# Patient Record
Sex: MALE | Race: BLACK OR AFRICAN AMERICAN | NOT HISPANIC OR LATINO | Employment: FULL TIME | URBAN - METROPOLITAN AREA
[De-identification: names, ages, dates, MRNs, and addresses within clinical notes are randomized per-mention and may not be internally consistent; named-entity substitution may affect disease eponyms.]

---

## 2017-05-08 ENCOUNTER — OFFICE VISIT (OUTPATIENT)
Dept: INTERNAL MEDICINE | Facility: CLINIC | Age: 52
End: 2017-05-08
Payer: COMMERCIAL

## 2017-05-08 ENCOUNTER — TELEPHONE (OUTPATIENT)
Dept: SURGERY | Facility: CLINIC | Age: 52
End: 2017-05-08

## 2017-05-08 VITALS
SYSTOLIC BLOOD PRESSURE: 120 MMHG | DIASTOLIC BLOOD PRESSURE: 80 MMHG | HEIGHT: 71 IN | BODY MASS INDEX: 43.3 KG/M2 | WEIGHT: 309.31 LBS | HEART RATE: 62 BPM | OXYGEN SATURATION: 98 %

## 2017-05-08 DIAGNOSIS — L29.3 ITCHING OF MALE GENITALIA: Primary | ICD-10-CM

## 2017-05-08 PROCEDURE — 99213 OFFICE O/P EST LOW 20 MIN: CPT | Mod: S$GLB,,, | Performed by: FAMILY MEDICINE

## 2017-05-08 PROCEDURE — 99999 PR PBB SHADOW E&M-EST. PATIENT-LVL III: CPT | Mod: PBBFAC,,, | Performed by: FAMILY MEDICINE

## 2017-05-08 PROCEDURE — 1160F RVW MEDS BY RX/DR IN RCRD: CPT | Mod: S$GLB,,, | Performed by: FAMILY MEDICINE

## 2017-05-08 RX ORDER — CLOTRIMAZOLE AND BETAMETHASONE DIPROPIONATE 10; .64 MG/G; MG/G
CREAM TOPICAL 2 TIMES DAILY
Qty: 45 G | Refills: 0 | Status: SHIPPED | OUTPATIENT
Start: 2017-05-08 | End: 2017-05-18

## 2017-05-08 NOTE — PROGRESS NOTES
"S/  UC visit. 2 weeks of penis itching helen inside of foreskin (not circumcised). Also noted a white lump there    O/  /80  Pulse 62  Ht 5' 11" (1.803 m)  Wt (!) 140.3 kg (309 lb 4.9 oz)  SpO2 98%  BMI 43.14 kg/m2  genital area with very faint rash at thigh/scrotum area c/w mild fungal inf  Penile shafter appearts normal before pulling foreskin back  After pulling foreskin back penile head also appears normal. The white bump he refers to appears to be some whitening of skin and perhaps small fatty plaque. Does not appear to be an STD.    Topher was seen today for itching.    Diagnoses and all orders for this visit:    Itching of male genitalia  -     clotrimazole-betamethasone 1-0.05% (LOTRISONE) cream; Apply topically 2 (two) times daily. X 10d  -if not resolved with this message me to have a urology appt set up        "

## 2017-05-08 NOTE — TELEPHONE ENCOUNTER
----- Message from Kay Caruso sent at 5/8/2017 11:45 AM CDT -----  Contact: self  Pt was discharged from [facility] on May 3rd.  He stated that he is not feeling right and needs to speak with the Dr asap.  He is also bleeding from his rectum.  Pt can be reached at [phone]

## 2017-05-08 NOTE — MR AVS SNAPSHOT
[facility]  [address]  [place] [address]  Phone: [phone]  Fax: [phone]                  Topher Cr   2017 11:00 AM   Office Visit    Description:  Male : 1965   Provider:  Jaskaran لاعلي MD   Department:  [facility]           Reason for Visit     Itching           Diagnoses this Visit        Comments    Itching of male genitalia    -  Primary            To Do List           Goals (5 Years of Data)     None       These Medications        Disp Refills Start End    clotrimazole-betamethasone 1-0.05% (LOTRISONE) cream 45 g 0 2017    Apply topically 2 (two) times daily. - Topical (Top)    Pharmacy: [facility] [address] [place] [address] [address] [place] [address] AT [facility] Ph #: [phone]         [facility] On [facility]     [facility] On Call Nurse Care Line -  Assistance  Unless otherwise directed by your provider, please contact [facility], our nurse care line that is available for  assistance.     Registered nurses in the [facility] provide: appointment scheduling, clinical advisement, health education, and other advisory services.  Call: [phone] (toll free)               Medications           Message regarding Medications     Verify the changes and/or additions to your medication regime listed below are the same as discussed with your clinician today.  If any of these changes or additions are incorrect, please notify your healthcare provider.        START taking these NEW medications        Refills    clotrimazole-betamethasone 1-0.05% (LOTRISONE) cream 0    Sig: Apply topically 2 (two) times daily.    Class: Normal    Route: Topical (Top)           Verify that the below list of medications is an accurate representation of the medications you are currently taking.  If none reported, the list may be blank. If incorrect, please contact your healthcare provider. Carry this  "list with you in case of emergency.           Current Medications     clotrimazole-betamethasone 1-0.05% (LOTRISONE) cream Apply topically 2 (two) times daily.           Clinical Reference Information           Your Vitals Were     BP Pulse Height Weight SpO2 BMI    120/80 62 5' 11" (1.803 m) 140.3 kg (309 lb 4.9 oz) 98% 43.14 kg/m2      Blood Pressure          Most Recent Value    BP  120/80      Allergies as of 5/8/2017     Cucumber Fruit Extract      Immunizations Administered on Date of Encounter - 5/8/2017     None      Instructions    If not resolved after 10 days of treatment, message me in [employer] to get a urology appointment       Language Assistance Services     ATTENTION: Language assistance services are available, free of charge. Please call [phone].      ATENCIÓN: Si devonla estelle, tiene a madison disposición servicios gratuitos de asistencia lingüística. Llame al [phone].     STEVEN Ý: N?u b?n nói Ti?ng Vi?t, có các d?ch v? h? tr? ngôn ng? mi?n phí dành cho b?n. G?i s? [phone].         [facility] complies with applicable Federal civil rights laws and does not discriminate on the basis of race, color, national origin, age, disability, or sex.        "

## 2017-08-01 ENCOUNTER — OFFICE VISIT (OUTPATIENT)
Dept: FAMILY MEDICINE | Facility: CLINIC | Age: 52
End: 2017-08-01
Payer: COMMERCIAL

## 2017-08-01 VITALS
DIASTOLIC BLOOD PRESSURE: 84 MMHG | HEIGHT: 71 IN | SYSTOLIC BLOOD PRESSURE: 132 MMHG | TEMPERATURE: 98 F | WEIGHT: 315 LBS | HEART RATE: 72 BPM | BODY MASS INDEX: 44.1 KG/M2

## 2017-08-01 DIAGNOSIS — L30.9 DERMATITIS: Primary | ICD-10-CM

## 2017-08-01 DIAGNOSIS — T63.461A WASP STING, ACCIDENTAL OR UNINTENTIONAL, INITIAL ENCOUNTER: ICD-10-CM

## 2017-08-01 PROCEDURE — 99214 OFFICE O/P EST MOD 30 MIN: CPT | Mod: S$GLB,,, | Performed by: NURSE PRACTITIONER

## 2017-08-01 PROCEDURE — 99999 PR PBB SHADOW E&M-EST. PATIENT-LVL III: CPT | Mod: PBBFAC,,, | Performed by: NURSE PRACTITIONER

## 2017-08-01 RX ORDER — CLOTRIMAZOLE AND BETAMETHASONE DIPROPIONATE 10; .64 MG/G; MG/G
CREAM TOPICAL 2 TIMES DAILY
Qty: 45 G | Refills: 3 | Status: SHIPPED | OUTPATIENT
Start: 2017-08-01 | End: 2017-08-31

## 2017-08-01 NOTE — PATIENT INSTRUCTIONS
Claritin during the day and BEnadryl 50 mg at night for the lip swelling from the wasp sting    Apply the cream to your rash under arms and on back twice a day     Keep skin cool and dry

## 2017-08-01 NOTE — PROGRESS NOTES
"Subjective:       Patient ID: Topher Cr is a 52 y.o. male.    Chief Complaint: Rash (bilateral arm pits) and Oral Swelling (wasp sting on upper lip)    Pt here c/o rash under  right axilla for about a month. Itches.  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  Also was stung in lip by a wasp yesterday.  Mild lip swelling.  NO SOB, no stridor no chest pain, no drooling      Rash   Pertinent negatives include no congestion, cough, fatigue, fever or shortness of breath.     Past Medical History:   Diagnosis Date    DJD (degenerative joint disease)     Hyperlipidemia     Obesity      History reviewed. No pertinent surgical history.  Social History     Social History Narrative    No narrative on file     Family History   Problem Relation Age of Onset    Asthma Mother     Heart disease Father      Vitals:    08/01/17 1109   BP: 132/84   Pulse: 72   Temp: 97.9 °F (36.6 °C)   Weight: (!) 143 kg (315 lb 4.1 oz)   Height: 5' 10.5" (1.791 m)   PainSc:   1     Outpatient Encounter Prescriptions as of 8/1/2017   Medication Sig Dispense Refill    clotrimazole-betamethasone 1-0.05% (LOTRISONE) cream Apply topically 2 (two) times daily. 45 g 3     No facility-administered encounter medications on file as of 8/1/2017.      Wt Readings from Last 3 Encounters:   08/01/17 (!) 143 kg (315 lb 4.1 oz)   05/08/17 (!) 140.3 kg (309 lb 4.9 oz)   07/18/16 (!) 141 kg (310 lb 13.6 oz)     Last 3 sets of Vitals    Vitals - 1 value per visit 7/18/2016 5/8/2017 8/1/2017   SYSTOLIC 144 120 132   DIASTOLIC 78 80 84   PULSE 64 62 72   TEMPERATURE 97.5 - 97.9   RESPIRATIONS - - -   SPO2 - 98 -   Weight (lb) 310.85 309.31 315.26   Weight (kg) 141 140.3 143   HEIGHT 5' 11" 5' 11" 5' 10.5"   BODY MASS INDEX 43.35 43.14 44.6   VISIT REPORT - - -   Pain Score  7 0 1   Some recent data might be hidden     No results found for: CBC  Review of Systems   Constitutional: Negative for appetite change, chills, " fatigue and fever.   HENT: Negative for congestion and drooling.    Respiratory: Negative for cough and shortness of breath.    Cardiovascular: Negative for chest pain.   Genitourinary: Negative for dysuria.   Skin: Positive for rash. Negative for wound.   Neurological: Negative for dizziness, facial asymmetry and headaches.       Objective:      Physical Exam   Constitutional: He is oriented to person, place, and time. He appears well-developed and well-nourished. No distress.   HENT:   Head: Normocephalic and atraumatic.   Mouth/Throat: Uvula is midline and oropharynx is clear and moist.       Eyes: Conjunctivae are normal.   Pulmonary/Chest: Effort normal. No stridor.   Abdominal: Soft.   Neurological: He is alert and oriented to person, place, and time.   Skin: Skin is warm and dry. Capillary refill takes less than 2 seconds. Rash noted. He is not diaphoretic.   Well demarcated, scaly dry rash under right axilla     Psychiatric: He has a normal mood and affect. His behavior is normal. Judgment and thought content normal.   Nursing note and vitals reviewed.      Assessment:       1. Dermatitis    2. Wasp sting, accidental or unintentional, initial encounter        Plan:       Topher was seen today for rash and oral swelling.    Diagnoses and all orders for this visit:    Dermatitis  -     clotrimazole-betamethasone 1-0.05% (LOTRISONE) cream; Apply topically 2 (two) times daily.    Wasp sting, accidental or unintentional, initial encounter  Comments:  to lip        Patient Instructions   Claritin during the day and BEnadryl 50 mg at night for the lip swelling from the wasp sting    Apply the cream to your rash under arms and on back twice a day     Keep skin cool and dry

## 2017-08-30 ENCOUNTER — TELEPHONE (OUTPATIENT)
Dept: FAMILY MEDICINE | Facility: CLINIC | Age: 52
End: 2017-08-30

## 2017-08-30 DIAGNOSIS — Z00.00 ANNUAL PHYSICAL EXAM: Primary | ICD-10-CM

## 2017-08-30 NOTE — TELEPHONE ENCOUNTER
----- Message from Rebeka Martinez sent at 8/30/2017 10:37 AM CDT -----  Contact: Self [phone]  Doctor appointment and lab have been scheduled.  Please link lab orders to the lab appointment.  Date of doctor appointment:  12/19  Physical or EP:  Physical  Date of lab appointment:  12/12  Comments:

## 2017-11-28 ENCOUNTER — OFFICE VISIT (OUTPATIENT)
Dept: FAMILY MEDICINE | Facility: CLINIC | Age: 52
End: 2017-11-28
Payer: COMMERCIAL

## 2017-11-28 VITALS
SYSTOLIC BLOOD PRESSURE: 126 MMHG | HEART RATE: 72 BPM | WEIGHT: 308.63 LBS | DIASTOLIC BLOOD PRESSURE: 80 MMHG | TEMPERATURE: 98 F | HEIGHT: 71 IN | BODY MASS INDEX: 43.21 KG/M2

## 2017-11-28 DIAGNOSIS — L73.9 FOLLICULITIS: Primary | ICD-10-CM

## 2017-11-28 PROCEDURE — 99213 OFFICE O/P EST LOW 20 MIN: CPT | Mod: S$GLB,,, | Performed by: FAMILY MEDICINE

## 2017-11-28 PROCEDURE — 99999 PR PBB SHADOW E&M-EST. PATIENT-LVL III: CPT | Mod: PBBFAC,,, | Performed by: FAMILY MEDICINE

## 2017-11-28 RX ORDER — METHYLPREDNISOLONE 4 MG/1
TABLET ORAL
Qty: 30 TABLET | Refills: 0 | Status: SHIPPED | OUTPATIENT
Start: 2017-11-28 | End: 2018-01-10

## 2017-11-28 RX ORDER — LEVOCETIRIZINE DIHYDROCHLORIDE 5 MG/1
5 TABLET, FILM COATED ORAL NIGHTLY
Qty: 30 TABLET | Refills: 11 | Status: SHIPPED | OUTPATIENT
Start: 2017-11-28 | End: 2018-01-10 | Stop reason: SDUPTHER

## 2017-11-28 RX ORDER — CEPHALEXIN 500 MG/1
500 CAPSULE ORAL EVERY 8 HOURS
Qty: 30 CAPSULE | Refills: 0 | Status: SHIPPED | OUTPATIENT
Start: 2017-11-28 | End: 2017-12-08

## 2017-11-28 NOTE — PROGRESS NOTES
Subjective:       Patient ID: Topher Cr is a 52 y.o. male.    Chief Complaint: Itching    Disclaimer: This note has been generated using voice-recognition software. There may be typographical errors that have been missed during proof-reading    51 yo presents for evaluation of recurrent pruritic rash, involving mostly the left back/gluteal area, present x several weeks.  He denies changes in soaps/detergents/lotions/new clothing.  Previously seen for pruritic rash involving axillae, which has now resolved      Review of Systems   Skin: Positive for color change and rash.       Objective:      Physical Exam   Skin:        Confluent erythematous rash with lichenification, several small ulcerated areas, with largest 1cm diameter, left gluteal area with some involvement of right gluteal area       Assessment:       1. Folliculitis        Plan:       1.  Medrol dosepak  2.  Keflex 500mg tid  3.  Xyzal 5mg   4.  F/u one week, consult Dermatology if not improved

## 2017-12-05 ENCOUNTER — PATIENT MESSAGE (OUTPATIENT)
Dept: FAMILY MEDICINE | Facility: CLINIC | Age: 52
End: 2017-12-05

## 2018-01-10 ENCOUNTER — LAB VISIT (OUTPATIENT)
Dept: LAB | Facility: HOSPITAL | Age: 53
End: 2018-01-10
Attending: FAMILY MEDICINE
Payer: COMMERCIAL

## 2018-01-10 ENCOUNTER — OFFICE VISIT (OUTPATIENT)
Dept: FAMILY MEDICINE | Facility: CLINIC | Age: 53
End: 2018-01-10
Payer: COMMERCIAL

## 2018-01-10 VITALS
TEMPERATURE: 98 F | SYSTOLIC BLOOD PRESSURE: 110 MMHG | BODY MASS INDEX: 44.1 KG/M2 | DIASTOLIC BLOOD PRESSURE: 78 MMHG | WEIGHT: 315 LBS | HEIGHT: 71 IN

## 2018-01-10 DIAGNOSIS — L29.9 GENERALIZED PRURITUS: ICD-10-CM

## 2018-01-10 DIAGNOSIS — L29.9 GENERALIZED PRURITUS: Primary | ICD-10-CM

## 2018-01-10 DIAGNOSIS — L73.9 FOLLICULITIS: ICD-10-CM

## 2018-01-10 LAB
ALBUMIN SERPL BCP-MCNC: 3.6 G/DL
ALP SERPL-CCNC: 46 U/L
ALT SERPL W/O P-5'-P-CCNC: 27 U/L
ANION GAP SERPL CALC-SCNC: 6 MMOL/L
AST SERPL-CCNC: 26 U/L
BASOPHILS # BLD AUTO: 0.06 K/UL
BASOPHILS NFR BLD: 1.1 %
BILIRUB SERPL-MCNC: 0.2 MG/DL
BUN SERPL-MCNC: 12 MG/DL
CALCIUM SERPL-MCNC: 9.3 MG/DL
CHLORIDE SERPL-SCNC: 106 MMOL/L
CO2 SERPL-SCNC: 28 MMOL/L
CREAT SERPL-MCNC: 0.9 MG/DL
DIFFERENTIAL METHOD: ABNORMAL
EOSINOPHIL # BLD AUTO: 0.2 K/UL
EOSINOPHIL NFR BLD: 3.4 %
ERYTHROCYTE [DISTWIDTH] IN BLOOD BY AUTOMATED COUNT: 14.6 %
EST. GFR  (AFRICAN AMERICAN): >60 ML/MIN/1.73 M^2
EST. GFR  (NON AFRICAN AMERICAN): >60 ML/MIN/1.73 M^2
ESTIMATED AVG GLUCOSE: 120 MG/DL
GLUCOSE SERPL-MCNC: 104 MG/DL
HBA1C MFR BLD HPLC: 5.8 %
HCT VFR BLD AUTO: 39.4 %
HGB BLD-MCNC: 12.2 G/DL
IMM GRANULOCYTES # BLD AUTO: 0.04 K/UL
IMM GRANULOCYTES NFR BLD AUTO: 0.8 %
LYMPHOCYTES # BLD AUTO: 2.5 K/UL
LYMPHOCYTES NFR BLD: 47.5 %
MCH RBC QN AUTO: 23 PG
MCHC RBC AUTO-ENTMCNC: 31 G/DL
MCV RBC AUTO: 74 FL
MONOCYTES # BLD AUTO: 0.6 K/UL
MONOCYTES NFR BLD: 10.5 %
NEUTROPHILS # BLD AUTO: 2 K/UL
NEUTROPHILS NFR BLD: 36.7 %
NRBC BLD-RTO: 0 /100 WBC
PLATELET # BLD AUTO: 353 K/UL
PMV BLD AUTO: 10.6 FL
POTASSIUM SERPL-SCNC: 4.5 MMOL/L
PROT SERPL-MCNC: 7.3 G/DL
RBC # BLD AUTO: 5.31 M/UL
SODIUM SERPL-SCNC: 140 MMOL/L
TSH SERPL DL<=0.005 MIU/L-ACNC: 1.29 UIU/ML
WBC # BLD AUTO: 5.31 K/UL

## 2018-01-10 PROCEDURE — 83036 HEMOGLOBIN GLYCOSYLATED A1C: CPT

## 2018-01-10 PROCEDURE — 80074 ACUTE HEPATITIS PANEL: CPT

## 2018-01-10 PROCEDURE — 84443 ASSAY THYROID STIM HORMONE: CPT

## 2018-01-10 PROCEDURE — 99214 OFFICE O/P EST MOD 30 MIN: CPT | Mod: S$GLB,,, | Performed by: FAMILY MEDICINE

## 2018-01-10 PROCEDURE — 36415 COLL VENOUS BLD VENIPUNCTURE: CPT | Mod: PO

## 2018-01-10 PROCEDURE — 99999 PR PBB SHADOW E&M-EST. PATIENT-LVL III: CPT | Mod: PBBFAC,,, | Performed by: FAMILY MEDICINE

## 2018-01-10 PROCEDURE — 85025 COMPLETE CBC W/AUTO DIFF WBC: CPT

## 2018-01-10 PROCEDURE — 80053 COMPREHEN METABOLIC PANEL: CPT

## 2018-01-10 RX ORDER — LEVOCETIRIZINE DIHYDROCHLORIDE 5 MG/1
5 TABLET, FILM COATED ORAL NIGHTLY
Qty: 30 TABLET | Refills: 11 | Status: SHIPPED | OUTPATIENT
Start: 2018-01-10 | End: 2018-04-06

## 2018-01-10 RX ORDER — TRIAMCINOLONE ACETONIDE 1 MG/G
CREAM TOPICAL 2 TIMES DAILY
Qty: 80 G | Refills: 0 | Status: SHIPPED | OUTPATIENT
Start: 2018-01-10 | End: 2018-04-06

## 2018-01-10 NOTE — PROGRESS NOTES
Subjective:       Patient ID: Topher Cr is a 52 y.o. male.    Chief Complaint: Rash    Disclaimer: This note has been generated using voice-recognition software. There may be typographical errors that have been missed during proof-reading    52-year-old presents today for follow-up of prior complaint of generalized pruritus.  After his last visit, symptoms improved for a short period of time, but have now returned.  He describes generalized pruritus without associated rash which seems to occur throughout the day.  It does not seem to be worsened by hot or cold showers.  He is presently not taking size several as previously recommended.  He denies any new medications, or changes in soaps, detergents, or lotions.      Rash       Review of Systems   Constitutional: Negative for unexpected weight change.   Gastrointestinal: Negative for abdominal distention and abdominal pain.   Endocrine: Negative for cold intolerance, heat intolerance, polydipsia, polyphagia and polyuria.   Musculoskeletal: Negative for arthralgias and joint swelling.   Skin: Negative for color change and rash.   Neurological: Negative for weakness.       Objective:      Physical Exam   Constitutional: He appears well-developed and well-nourished. No distress.   obese   Neck: Normal range of motion. No JVD present. No thyromegaly present.   Cardiovascular: Normal rate and regular rhythm.    No murmur heard.  Pulmonary/Chest: Effort normal and breath sounds normal. He has no wheezes. He has no rales.   Abdominal: Soft. Bowel sounds are normal. He exhibits no distension and no mass.   Musculoskeletal: He exhibits edema.   Lymphadenopathy:     He has no cervical adenopathy.   Skin:        Hyperkeratotic area along anterior waist line, some excoriations/hyperpigmentation along both forearms       Assessment:        1.  Generalized pruritus  Plan:       1.  CBC, CMP, A1C, TSH, Hepatitis profile  2.  Restart Xyzal, TAC 0.1% topically to waist line as  needed  3.  Consult Dermatology

## 2018-01-11 LAB
HAV IGM SERPL QL IA: NEGATIVE
HBV CORE IGM SERPL QL IA: NEGATIVE
HBV SURFACE AG SERPL QL IA: NEGATIVE
HCV AB SERPL QL IA: NEGATIVE

## 2018-03-05 ENCOUNTER — PATIENT MESSAGE (OUTPATIENT)
Dept: FAMILY MEDICINE | Facility: CLINIC | Age: 53
End: 2018-03-05

## 2018-03-29 ENCOUNTER — OFFICE VISIT (OUTPATIENT)
Dept: FAMILY MEDICINE | Facility: CLINIC | Age: 53
End: 2018-03-29
Payer: COMMERCIAL

## 2018-03-29 VITALS
HEIGHT: 71 IN | TEMPERATURE: 98 F | DIASTOLIC BLOOD PRESSURE: 80 MMHG | WEIGHT: 312.19 LBS | HEART RATE: 64 BPM | SYSTOLIC BLOOD PRESSURE: 116 MMHG | BODY MASS INDEX: 43.71 KG/M2

## 2018-03-29 DIAGNOSIS — E66.01 MORBID OBESITY WITH BMI OF 40.0-44.9, ADULT: ICD-10-CM

## 2018-03-29 DIAGNOSIS — M79.661 PAIN AND SWELLING OF RIGHT LOWER LEG: Primary | ICD-10-CM

## 2018-03-29 DIAGNOSIS — M79.89 PAIN AND SWELLING OF RIGHT LOWER LEG: Primary | ICD-10-CM

## 2018-03-29 DIAGNOSIS — S89.91XA RIGHT LEG INJURY, INITIAL ENCOUNTER: ICD-10-CM

## 2018-03-29 PROCEDURE — 99214 OFFICE O/P EST MOD 30 MIN: CPT | Mod: S$GLB,,, | Performed by: FAMILY MEDICINE

## 2018-03-29 PROCEDURE — 99999 PR PBB SHADOW E&M-EST. PATIENT-LVL III: CPT | Mod: PBBFAC,,, | Performed by: FAMILY MEDICINE

## 2018-03-29 NOTE — PROGRESS NOTES
"Subjective:      Patient ID: Topher Cr is a 52 y.o. male.    Chief Complaint: Leg Swelling (right)    Here today for RIGHT sided leg swelling & pain since  he jumped across a puddle and felt a sudden pain in the right calf.  This was 3 weeks ago.  He states he has had right leg swelling since then had the pain is decreased.  Denies any bruising.  No history of DVT.  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  He drinks minimal amounts of water daily, use 2 cups of caffeine daily.    No chest pain, no shortness of breath.  No family history of clotting disorder    Current Outpatient Prescriptions on File Prior to Visit   Medication Sig    levocetirizine (XYZAL) 5 MG tablet Take 1 tablet (5 mg total) by mouth every evening.    triamcinolone acetonide 0.1% (KENALOG) 0.1 % cream Apply topically 2 (two) times daily. Use along waistline     No current facility-administered medications on file prior to visit.      Past Medical History:   Diagnosis Date    DJD (degenerative joint disease)     Hyperlipidemia     Morbid obesity with BMI of 40.0-44.9, adult 3/29/2018    Obesity      No past surgical history on file.  Social History     Social History Narrative    Works in [employer], nonsmoker     Family History   Problem Relation Age of Onset    Asthma Mother     Heart disease Father      Vitals:    03/29/18 0943   BP: 116/80   Pulse: 64   Temp: 97.8 °F (36.6 °C)   Weight: (!) 141.6 kg (312 lb 2.7 oz)   Height: 5' 11" (1.803 m)   PainSc: 0-No pain     Objective:   Physical Exam   Constitutional: He appears well-developed and well-nourished. No distress.   Cardiovascular: Normal rate, regular rhythm and normal heart sounds.    Pulmonary/Chest: Effort normal and breath sounds normal.   Musculoskeletal:   1 + pitting edema RIGHT ankle swelling,  Trace pitting edema LEFT ankle, nontender supple calves bilateral, normal gait, no skin changes, no contusion, 2+ pulses, less than 2 second capillary " refill         Psychiatric: He has a normal mood and affect. His behavior is normal.   Vitals reviewed.    Assessment:     1. Pain and swelling of right lower leg    2. Morbid obesity with BMI of 40.0-44.9, adult    3. Right leg injury, initial encounter      Plan:     Orders Placed This Encounter    VAS US Venous Leg Right       Patient Instructions   --------------------------  WATER BALANCE-  Your body systems work best with 64 ounces DAILY  (equal to 8 cups or a HALF A GALLON DAILY)   - to flush out impurities & cleanse our organs.   For every 8 ounces of caffeine you drink, ADD ANOTHER CUP of water to your daily water needs. (2 cups of coffee and one diet coke = you need 11 glasses of water a day). We were not made to drink sugary drinks  - not even artificial sweeteners.   You'll notice a difference in your brain function, energy level & overall wellness. Your liver & kidney filters will thank you too for keeping them properly cleansed  ---------------------------    Get moving - exercise daily 20 minutes    ==============================================  FOR LEG SWELLING:    Try to stop these things that can cause leg swelling: salt, caffeine, energy drinks, diet pills, sudafed, alcohol    DECREASE ALCOHOL CONSUMPTION    DECREASE SALT (fast foods, frozen, canned, processed foods, ham, turkey, fried foods, chips, crackers, etc) & drink 8 glasses of water a day with minimal caffeine ( 1 cup a day)   ==============================================    To ER if acutely worsen because I won't get the results over Good Friday & until the clinic opens next week.                               Answers for HPI/ROS submitted by the patient on 3/27/2018   activity change: No  unexpected weight change: No  neck pain: No  hearing loss: No  rhinorrhea: No  trouble swallowing: No  eye discharge: No  visual disturbance: No  chest tightness: No  wheezing: No  chest pain: No  palpitations: No  blood in stool: No  constipation:  No  vomiting: No  diarrhea: Yes  polydipsia: No  polyuria: No  difficulty urinating: No  urgency: No  hematuria: No  joint swelling: No  arthralgias: No  headaches: No  weakness: No  confusion: No  dysphoric mood: No

## 2018-03-29 NOTE — PATIENT INSTRUCTIONS
--------------------------  WATER BALANCE-  Your body systems work best with 64 ounces DAILY  (equal to 8 cups or a HALF A GALLON DAILY)   - to flush out impurities & cleanse our organs.   For every 8 ounces of caffeine you drink, ADD ANOTHER CUP of water to your daily water needs. (2 cups of coffee and one diet coke = you need 11 glasses of water a day). We were not made to drink sugary drinks  - not even artificial sweeteners.   You'll notice a difference in your brain function, energy level & overall wellness. Your liver & kidney filters will thank you too for keeping them properly cleansed  ---------------------------    Get moving - exercise daily 20 minutes    ==============================================  FOR LEG SWELLING:    Try to stop these things that can cause leg swelling: salt, caffeine, energy drinks, diet pills, sudafed, alcohol    DECREASE ALCOHOL CONSUMPTION    DECREASE SALT (fast foods, frozen, canned, processed foods, ham, turkey, fried foods, chips, crackers, etc) & drink 8 glasses of water a day with minimal caffeine ( 1 cup a day)   ==============================================    To ER if acutely worsen because I won't get the results over Good Friday & until the clinic opens next week.

## 2018-04-05 ENCOUNTER — HOSPITAL ENCOUNTER (OUTPATIENT)
Dept: VASCULAR SURGERY | Facility: CLINIC | Age: 53
Discharge: HOME | End: 2018-04-05
Attending: FAMILY MEDICINE
Payer: COMMERCIAL

## 2018-04-05 DIAGNOSIS — M79.89 PAIN AND SWELLING OF RIGHT LOWER LEG: ICD-10-CM

## 2018-04-05 DIAGNOSIS — M79.661 PAIN AND SWELLING OF RIGHT LOWER LEG: ICD-10-CM

## 2018-04-05 DIAGNOSIS — S89.91XA RIGHT LEG INJURY, INITIAL ENCOUNTER: ICD-10-CM

## 2018-04-05 PROCEDURE — 93970 EXTREMITY STUDY: CPT | Mod: S$GLB,,, | Performed by: SURGERY

## 2018-04-06 ENCOUNTER — PATIENT MESSAGE (OUTPATIENT)
Dept: FAMILY MEDICINE | Facility: CLINIC | Age: 53
End: 2018-04-06

## 2018-04-06 ENCOUNTER — HOSPITAL ENCOUNTER (OUTPATIENT)
Facility: HOSPITAL | Age: 53
Discharge: HOME | End: 2018-04-06
Attending: EMERGENCY MEDICINE | Admitting: HOSPITALIST
Payer: COMMERCIAL

## 2018-04-06 VITALS
WEIGHT: 312 LBS | TEMPERATURE: 98 F | BODY MASS INDEX: 43.68 KG/M2 | DIASTOLIC BLOOD PRESSURE: 76 MMHG | RESPIRATION RATE: 18 BRPM | SYSTOLIC BLOOD PRESSURE: 158 MMHG | HEART RATE: 77 BPM | HEIGHT: 71 IN | OXYGEN SATURATION: 97 %

## 2018-04-06 DIAGNOSIS — L73.9 FOLLICULITIS: ICD-10-CM

## 2018-04-06 DIAGNOSIS — I82.411 ACUTE DEEP VEIN THROMBOSIS (DVT) OF FEMORAL VEIN OF RIGHT LOWER EXTREMITY: Primary | ICD-10-CM

## 2018-04-06 DIAGNOSIS — L29.9 GENERALIZED PRURITUS: ICD-10-CM

## 2018-04-06 LAB
ALBUMIN SERPL BCP-MCNC: 3.9 G/DL
ALP SERPL-CCNC: 47 U/L
ALT SERPL W/O P-5'-P-CCNC: 33 U/L
ANION GAP SERPL CALC-SCNC: 9 MMOL/L
APTT BLDCRRT: 25.4 SEC
AST SERPL-CCNC: 29 U/L
BASOPHILS # BLD AUTO: 0.08 K/UL
BASOPHILS NFR BLD: 1.3 %
BILIRUB SERPL-MCNC: 0.3 MG/DL
BUN SERPL-MCNC: 12 MG/DL
CALCIUM SERPL-MCNC: 9.2 MG/DL
CHLORIDE SERPL-SCNC: 105 MMOL/L
CO2 SERPL-SCNC: 24 MMOL/L
CREAT SERPL-MCNC: 0.8 MG/DL
DIFFERENTIAL METHOD: ABNORMAL
EOSINOPHIL # BLD AUTO: 0.3 K/UL
EOSINOPHIL NFR BLD: 5 %
ERYTHROCYTE [DISTWIDTH] IN BLOOD BY AUTOMATED COUNT: 14.4 %
EST. GFR  (AFRICAN AMERICAN): >60 ML/MIN/1.73 M^2
EST. GFR  (NON AFRICAN AMERICAN): >60 ML/MIN/1.73 M^2
GLUCOSE SERPL-MCNC: 109 MG/DL
HCT VFR BLD AUTO: 41 %
HGB BLD-MCNC: 12.7 G/DL
IMM GRANULOCYTES # BLD AUTO: 0.05 K/UL
IMM GRANULOCYTES NFR BLD AUTO: 0.8 %
INR PPP: 1
LYMPHOCYTES # BLD AUTO: 2.5 K/UL
LYMPHOCYTES NFR BLD: 39.7 %
MCH RBC QN AUTO: 23 PG
MCHC RBC AUTO-ENTMCNC: 31 G/DL
MCV RBC AUTO: 74 FL
MONOCYTES # BLD AUTO: 0.5 K/UL
MONOCYTES NFR BLD: 7.5 %
NEUTROPHILS # BLD AUTO: 2.9 K/UL
NEUTROPHILS NFR BLD: 45.7 %
NRBC BLD-RTO: 0 /100 WBC
PLATELET # BLD AUTO: 388 K/UL
PMV BLD AUTO: 9.2 FL
POTASSIUM SERPL-SCNC: 3.9 MMOL/L
PROT SERPL-MCNC: 7.4 G/DL
PROTHROMBIN TIME: 10.9 SEC
RBC # BLD AUTO: 5.52 M/UL
SODIUM SERPL-SCNC: 138 MMOL/L
WBC # BLD AUTO: 6.39 K/UL

## 2018-04-06 PROCEDURE — 80053 COMPREHEN METABOLIC PANEL: CPT

## 2018-04-06 PROCEDURE — 99284 EMERGENCY DEPT VISIT MOD MDM: CPT

## 2018-04-06 PROCEDURE — 85025 COMPLETE CBC W/AUTO DIFF WBC: CPT

## 2018-04-06 PROCEDURE — 99219 PR INITIAL OBSERVATION CARE,LEVL II: CPT | Mod: SA,,, | Performed by: PHYSICIAN ASSISTANT

## 2018-04-06 PROCEDURE — 85730 THROMBOPLASTIN TIME PARTIAL: CPT

## 2018-04-06 PROCEDURE — 85610 PROTHROMBIN TIME: CPT

## 2018-04-06 PROCEDURE — 99284 EMERGENCY DEPT VISIT MOD MDM: CPT | Mod: SA,,, | Performed by: PHYSICIAN ASSISTANT

## 2018-04-06 PROCEDURE — G0378 HOSPITAL OBSERVATION PER HR: HCPCS

## 2018-04-06 RX ORDER — LEVOCETIRIZINE DIHYDROCHLORIDE 5 MG/1
5 TABLET, FILM COATED ORAL NIGHTLY
Qty: 30 TABLET | Refills: 11 | Status: SHIPPED | OUTPATIENT
Start: 2018-04-06 | End: 2018-11-06 | Stop reason: SDUPTHER

## 2018-04-06 RX ORDER — TRIAMCINOLONE ACETONIDE 1 MG/G
CREAM TOPICAL 2 TIMES DAILY
Qty: 80 G | Refills: 0 | Status: SHIPPED | OUTPATIENT
Start: 2018-04-06 | End: 2018-07-18

## 2018-04-06 NOTE — HOSPITAL COURSE
Pt admitted to observation for acute DVT of RLE found on outpatient ultrasound 4/5. HDS on admission without tachycardia or hypoxia. Labs stable. Pt discharged home with Rx for Eliquis. PCP f/u as outpatient.

## 2018-04-06 NOTE — ED PROVIDER NOTES
Encounter Date: 4/6/2018    SCRIBE #1 NOTE: I, Meena Beverly, am scribing for, and in the presence of, Dr. Treviño.       History     Chief Complaint   Patient presents with    Abnormal Lab     Pt was called by MD for + blood clot in the R leg     Patient is a 52-year-old male with no significant past medical history who presents the ED after being referred by his primary care physician for DVT.  Patient states approximately 3 weeks ago, he jumped over a water puddle and developed right lower extremity pain.  His pain persisted for a few days and has been pain free since. Patient states that he noticed unilateral leg swelling.  Patient presented to his  PCP today and had an ultrasound of his right lower extremity which reveals a clot in the mid SFV. He was referred to the ED for further evaluation and treatment.  Patient denies any fever, chills, chest pain, cough, SOB.  He denies any history of DVT/PE or easy bleeding or bruising.  No other complaints at this time.          Review of patient's allergies indicates:   Allergen Reactions    Cucumber fruit extract Hives and Itching     Past Medical History:   Diagnosis Date    DJD (degenerative joint disease)     Hyperlipidemia     Morbid obesity with BMI of 40.0-44.9, adult 3/29/2018    Obesity      Past Surgical History:   Procedure Laterality Date    KNEE SURGERY Right      Family History   Problem Relation Age of Onset    Asthma Mother     Heart disease Father      Social History   Substance Use Topics    Smoking status: Never Smoker    Smokeless tobacco: Never Used    Alcohol use No     Review of Systems   Constitutional: Negative for chills and fever.   HENT: Negative for ear pain and sore throat.    Eyes: Negative for photophobia.   Respiratory: Negative for cough and shortness of breath.    Cardiovascular: Positive for leg swelling. Negative for chest pain.   Gastrointestinal: Negative for nausea.   Genitourinary: Negative for dysuria and hematuria.    Musculoskeletal: Negative for back pain and neck pain.   Skin: Negative for rash.   Neurological: Negative for weakness and headaches.   Hematological: Does not bruise/bleed easily.       Physical Exam     Initial Vitals [04/06/18 1543]   BP Pulse Resp Temp SpO2   (!) 158/76 77 18 98 °F (36.7 °C) 97 %      MAP       103.33         Physical Exam    Vitals reviewed.  Constitutional: He appears well-developed and well-nourished. He is not diaphoretic. No distress.   HENT:   Head: Normocephalic and atraumatic.   Nose: Nose normal.   Eyes: Conjunctivae and EOM are normal.   Neck: Normal range of motion.   Cardiovascular: Normal rate, regular rhythm and normal heart sounds. Exam reveals no friction rub.    No murmur heard.  Pulses:       Dorsalis pedis pulses are 2+ on the right side, and 2+ on the left side.   Unilateral right LE swelling. No calf tenderness.    Pulmonary/Chest: Breath sounds normal. No respiratory distress. He has no wheezes. He has no rales.   Abdominal: Soft. Bowel sounds are normal. He exhibits no distension. There is no tenderness.   Musculoskeletal: Normal range of motion.   Neurological: He is alert and oriented to person, place, and time. He has normal strength. No sensory deficit.   Skin: Skin is warm and dry. No erythema.   Psychiatric: He has a normal mood and affect. Thought content normal.         ED Course   Procedures  Labs Reviewed   CBC W/ AUTO DIFFERENTIAL - Abnormal; Notable for the following:        Result Value    Hemoglobin 12.7 (*)     MCV 74 (*)     MCH 23.0 (*)     MCHC 31.0 (*)     Platelets 388 (*)     Immature Granulocytes 0.8 (*)     Immature Grans (Abs) 0.05 (*)     All other components within normal limits   COMPREHENSIVE METABOLIC PANEL - Abnormal; Notable for the following:     Alkaline Phosphatase 47 (*)     All other components within normal limits   PROTIME-INR   APTT             Medical Decision Making:   History:   Old Medical Records: I decided to obtain old  medical records.  Clinical Tests:   Lab Tests: Ordered and Reviewed       APC / Resident Notes:   Patient presents the ED with DVT in mid SFV on the right confirmed by US. I do not suspect PE. No complaints of CP or SOB and VSS.    CBC with no leukocytosis.  Microcytic anemia with H/H at 12.7/41.0. Platelets at 388.  CMP with no electrolyte abnormalities.  Creatinine stable at 0.8.  PT and aPTT WNL.    Patient will be placed in observation with hospital medicine for further evaluation and management of DVT on the left.  Will defer anticoagulation to hospital medicine team.       Scribe Attestation:   Scribe #1: I performed the above scribed service and the documentation accurately describes the services I performed. I attest to the accuracy of the note.    Attending Attestation:     Physician Attestation Statement for NP/PA:   I discussed this assessment and plan of this patient with the NP/PA, but I did not personally examine the patient. The face to face encounter was performed by the NP/PA.                     Clinical Impression:   The primary encounter diagnosis was Acute deep vein thrombosis (DVT) of femoral vein of right lower extremity. Diagnoses of Generalized pruritus and Folliculitis were also pertinent to this visit.    Disposition:   Disposition: Placed in Observation  Condition: Stable                        Holly Lai PA-C  04/06/18 1800

## 2018-04-06 NOTE — ED NOTES
LOC: The patient is awake, alert and aware of environment with an appropriate affect, the patient is oriented x 3 and speaking appropriately.  APPEARANCE: Patient resting comfortably and in no acute distress, patient is clean and well groomed  SKIN: The skin is warm and dry, color consistent with ethnicity, patient has normal skin turgor and moist mucus membranes, skin intact, no breakdown or brusing noted.  MUSCULOSKELETAL: Patient moving all extremities well. Pt has swelling +2 to the R lower extremity  RESPIRATORY: Airway is open and patent, breath sounds clear throughout all lung fields; respirations are spontaneous, patient has a normal effort and rate, no accessory muscle use noted.   CARDIAC: Patient has no peripheral edema noted, capillary refill < 3 seconds. No complaints of chest pain   ABDOMEN: Soft and non tender to palpation, no distention noted. Bowel sounds present x 4  NEUROLOGIC: PERRL,  3mm bilaterally, eyes open spontaneously, behavior appropriate to situation, follows commands, facial expression symmetrical, bilateral hand grasp equal and even, purposeful motor response noted, normal sensation in all extremities when touched with a finger.

## 2018-04-06 NOTE — HPI
"52 year old male with no significant past medical history presenting to the ED after being referred by his PCP for DVT found on ultrasound 4/5. Patient reports being in his normal state of health until ~3 weeks ago when he "jumped over a puddle" and developed right leg pain and swelling. Patient initially attributed pain and swelling to suspected musculoskeletal injury. Pt reports pain persisted for a few days and self resolved. Patient presented to PCP 3/29 and an outpatient ultrasound of RLE was ordered. US performed 4/5 revealed a non-occlusive thrombus with partial compression in the mid SFV. At the time of my exam, pain to RLE has entirely resolved. Pt also reports improved swelling to RLE. Patient denies difficulty with ambulation, chest pain, SOB, abdominal pain, N/V/D, fever, chills, and recent illness. Patient denies history of DVT/PE.      In the ED, HDS without tachycardia or hypoxia. Labs stable.  "

## 2018-04-06 NOTE — ASSESSMENT & PLAN NOTE
"- Referred by PCP to the ED for DVT found on ultrasound 4/5  - DVT likely occurred 3 weeks ago s/p "jumped over a puddle" with associated RLE pain and swelling  - US performed 4/5 revealed a non-occlusive thrombus with partial compression in the mid SFV  - Pain entirely resolved and swelling improved on admit per patient  - Patient denies all other complaints, including history of DVT/PE  - In the ED, HDS. No tachycardia or hypoxia. Labs stable  - Discussed risks/benefits of anticoagulation. Provided patient with Rx for Eliquis to begin tonight. Educated on how to take medication and answered all questions. Patient voiced understanding.  - PCP f/u as outpatient  "

## 2018-04-06 NOTE — H&P
"[facility] Hospital Medicine  History & Physical    Patient Name: Topher Cr  MRN: [id number]  Admission Date: 4/6/2018  Attending Physician: Kimberley Mejia MD   Primary Care Provider: Navin Mcfadden MD    [facility] Medicine Team: [facility] MED  Shalonda Mcrae PA-C     Patient information was obtained from patient, past medical records and ER records.     Subjective:     Principal Problem:Acute deep vein thrombosis (DVT) of femoral vein of right lower extremity    Chief Complaint:   Chief Complaint   Patient presents with    Abnormal Lab     Pt was called by MD for + blood clot in the R leg        HPI: 52 year old male with no significant past medical history presenting to the ED after being referred by his PCP for DVT found on ultrasound 4/5. Patient  reports being in his normal state of health until ~3 weeks ago when he "jumped over a puddle" and developed right leg pain and swelling. Patient initially attributed pain and swelling to suspected musculoskeletal injury. Pt reports pain persisted for a few days and self resolved. Patient presented to PCP  3/29 and an outpatient ultrasound of RLE was ordered. US performed 4/5 revealed a non-occlusive thrombus with partial compression in the mid SFV. At the time of my exam, pain to RLE has entirely resolved. Pt also reports improved swelling to RLE. Patient denies difficulty with ambulation, chest pain, SOB, abdominal pain, N/V/D, fever, chills, and recent illness. Patient  denies history of DVT/PE.      In the ED, HDS without tachycardia or hypoxia. Labs stable.    Past Medical History:   Diagnosis Date    DJD (degenerative joint disease)     Hyperlipidemia     Morbid obesity with BMI of 40.0-44.9, adult 3/29/2018    Obesity        Past Surgical History:   Procedure Laterality Date    KNEE SURGERY Right        Review of patient's allergies indicates:   Allergen Reactions    Cucumber fruit extract Hives and Itching       No current " facility-administered medications on file prior to encounter.      Current Outpatient Prescriptions on File Prior to Encounter   Medication Sig    [DISCONTINUED] levocetirizine (XYZAL) 5 MG tablet Take 1 tablet (5 mg total) by mouth every evening.    [DISCONTINUED] triamcinolone acetonide 0.1% (KENALOG) 0.1 % cream Apply topically 2 (two) times daily. Use along waistline     Family History     Problem Relation (Age of Onset)    Asthma Mother    Heart disease Father        Social History Main Topics    Smoking status: Never Smoker    Smokeless tobacco: Never Used    Alcohol use No    Drug use: No    Sexual activity: Not on file     Review of Systems   Constitutional: Negative for chills, diaphoresis, fatigue and fever.   HENT: Negative for congestion, drooling, hearing loss, rhinorrhea, sore throat and trouble swallowing.    Eyes: Negative for photophobia and visual disturbance.   Respiratory: Negative for cough, chest tightness, shortness of breath and wheezing.    Cardiovascular: Positive for leg swelling (RLE x3 weeks). Negative for chest pain and palpitations.   Gastrointestinal: Negative for abdominal distention, abdominal pain, diarrhea, nausea and vomiting.   Endocrine: Negative for cold intolerance and heat intolerance.   Genitourinary: Negative for difficulty urinating, dysuria, flank pain, frequency and hematuria.   Musculoskeletal: Negative for back pain, gait problem, myalgias and neck pain.   Skin: Negative for rash and wound.   Neurological: Negative for dizziness, syncope, weakness, light-headedness, numbness and headaches.   Hematological: Negative for adenopathy. Does not bruise/bleed easily.   Psychiatric/Behavioral: Negative for agitation, behavioral problems, confusion and dysphoric mood. The patient is not nervous/anxious.      Objective:     Vital Signs (Most Recent):  Temp: 98 °F (36.7 °C) (04/06/18 1543)  Pulse: 77 (04/06/18 1543)  Resp: 18 (04/06/18 1543)  BP: (!) 158/76 (04/06/18  "1543)  SpO2: 97 % (04/06/18 1543) Vital Signs (24h Range):  Temp:  [98 °F (36.7 °C)] 98 °F (36.7 °C)  Pulse:  [77] 77  Resp:  [18] 18  SpO2:  [97 %] 97 %  BP: (158)/(76) 158/76     Weight: (!) 141.5 kg (312 lb)  Body mass index is 43.52 kg/m².    Physical Exam   Constitutional: He is oriented to person, place, and time. He appears well-developed. No distress.   HENT:   Head: Normocephalic and atraumatic.   Eyes: Conjunctivae and EOM are normal. Right eye exhibits no discharge. Left eye exhibits no discharge. No scleral icterus.   Neck: Normal range of motion. Neck supple.   Cardiovascular: Normal rate, regular rhythm, normal heart sounds and intact distal pulses.    Pulmonary/Chest: Effort normal and breath sounds normal. No respiratory distress. He has no wheezes.   Abdominal: Soft. Bowel sounds are normal. He exhibits no distension. There is no tenderness.   Obese    Musculoskeletal: Normal range of motion. He exhibits edema (2+ to RLE). He exhibits no tenderness or deformity.   Neurological: He is alert and oriented to person, place, and time. No cranial nerve deficit.   Skin: Skin is warm and dry. He is not diaphoretic. No erythema.   Psychiatric: He has a normal mood and affect. His behavior is normal.         CRANIAL NERVES     CN III, IV, VI   Extraocular motions are normal.        Significant Labs: All pertinent labs within the past 24 hours have been reviewed.    Significant Imaging: I have reviewed all pertinent imaging results/findings within the past 24 hours.    Assessment/Plan:     * Acute deep vein thrombosis (DVT) of femoral vein of right lower extremity    - Referred by PCP to the ED for DVT found on ultrasound 4/5  - DVT likely occurred 3 weeks ago s/p "jumped over a puddle" with associated RLE pain and swelling  - US performed 4/5 revealed a non-occlusive thrombus with partial compression in the mid SFV  - Pain entirely resolved and swelling improved on admit per patient  - Patient denies all " other complaints, including history of DVT/PE  - In the ED, HDS. No tachycardia or hypoxia. Labs stable  - Discussed risks/benefits of anticoagulation. Provided patient with Rx for Eliquis to begin tonight. Educated on how to take medication and answered all questions. Patient voiced understanding.  - PCP f/u as outpatient          VTE Risk Mitigation     None             Shalonda Mcrae PA-C  Department of Hospital Medicine   [facility]  Discussed with staff: Dr. Mejia

## 2018-04-06 NOTE — TELEPHONE ENCOUNTER
Pt advised that US did show a DVT (blood clot) in pt's leg.  This can be life threatening.  Pt should go to ER now.  Pt verbalized understanding

## 2018-04-06 NOTE — SUBJECTIVE & OBJECTIVE
Past Medical History:   Diagnosis Date    DJD (degenerative joint disease)     Hyperlipidemia     Morbid obesity with BMI of 40.0-44.9, adult 3/29/2018    Obesity        Past Surgical History:   Procedure Laterality Date    KNEE SURGERY Right        Review of patient's allergies indicates:   Allergen Reactions    Cucumber fruit extract Hives and Itching       No current facility-administered medications on file prior to encounter.      Current Outpatient Prescriptions on File Prior to Encounter   Medication Sig    [DISCONTINUED] levocetirizine (XYZAL) 5 MG tablet Take 1 tablet (5 mg total) by mouth every evening.    [DISCONTINUED] triamcinolone acetonide 0.1% (KENALOG) 0.1 % cream Apply topically 2 (two) times daily. Use along waistline     Family History     Problem Relation (Age of Onset)    Asthma Mother    Heart disease Father        Social History Main Topics    Smoking status: Never Smoker    Smokeless tobacco: Never Used    Alcohol use No    Drug use: No    Sexual activity: Not on file     Review of Systems   Constitutional: Negative for chills, diaphoresis, fatigue and fever.   HENT: Negative for congestion, drooling, hearing loss, rhinorrhea, sore throat and trouble swallowing.    Eyes: Negative for photophobia and visual disturbance.   Respiratory: Negative for cough, chest tightness, shortness of breath and wheezing.    Cardiovascular: Positive for leg swelling (RLE x3 weeks). Negative for chest pain and palpitations.   Gastrointestinal: Negative for abdominal distention, abdominal pain, diarrhea, nausea and vomiting.   Endocrine: Negative for cold intolerance and heat intolerance.   Genitourinary: Negative for difficulty urinating, dysuria, flank pain, frequency and hematuria.   Musculoskeletal: Negative for back pain, gait problem, myalgias and neck pain.   Skin: Negative for rash and wound.   Neurological: Negative for dizziness, syncope, weakness, light-headedness, numbness and headaches.    Hematological: Negative for adenopathy. Does not bruise/bleed easily.   Psychiatric/Behavioral: Negative for agitation, behavioral problems, confusion and dysphoric mood. The patient is not nervous/anxious.      Objective:     Vital Signs (Most Recent):  Temp: 98 °F (36.7 °C) (04/06/18 1543)  Pulse: 77 (04/06/18 1543)  Resp: 18 (04/06/18 1543)  BP: (!) 158/76 (04/06/18 1543)  SpO2: 97 % (04/06/18 1543) Vital Signs (24h Range):  Temp:  [98 °F (36.7 °C)] 98 °F (36.7 °C)  Pulse:  [77] 77  Resp:  [18] 18  SpO2:  [97 %] 97 %  BP: (158)/(76) 158/76     Weight: (!) 141.5 kg (312 lb)  Body mass index is 43.52 kg/m².    Physical Exam   Constitutional: He is oriented to person, place, and time. He appears well-developed. No distress.   HENT:   Head: Normocephalic and atraumatic.   Eyes: Conjunctivae and EOM are normal. Right eye exhibits no discharge. Left eye exhibits no discharge. No scleral icterus.   Neck: Normal range of motion. Neck supple.   Cardiovascular: Normal rate, regular rhythm, normal heart sounds and intact distal pulses.    Pulmonary/Chest: Effort normal and breath sounds normal. No respiratory distress. He has no wheezes.   Abdominal: Soft. Bowel sounds are normal. He exhibits no distension. There is no tenderness.   Obese    Musculoskeletal: Normal range of motion. He exhibits edema (2+ to RLE). He exhibits no tenderness or deformity.   Neurological: He is alert and oriented to person, place, and time. No cranial nerve deficit.   Skin: Skin is warm and dry. He is not diaphoretic. No erythema.   Psychiatric: He has a normal mood and affect. His behavior is normal.         CRANIAL NERVES     CN III, IV, VI   Extraocular motions are normal.        Significant Labs: All pertinent labs within the past 24 hours have been reviewed.    Significant Imaging: I have reviewed all pertinent imaging results/findings within the past 24 hours.

## 2018-04-06 NOTE — DISCHARGE SUMMARY
"[facility] Hospital Medicine  Discharge Summary      Patient Name: Topher Cr  MRN: [id number]  Admission Date: 4/6/2018  Hospital Length of Stay: 0 days  Discharge Date and Time: 4/6/2018  Attending Physician: Leatha Treviño MD   Discharging Provider: Shalonda Mcrae PA-C  Primary Care Provider: Navin Mcfadden MD  [facility] Medicine Team: [facility] MED F Shalonda Mcrae PA-C    HPI:   52 year old male with no significant past medical history presenting to the ED after being referred by his PCP for DVT found on ultrasound 4/5. Patient  reports being in his normal state of health until ~3 weeks ago when he "jumped over a puddle" and developed right leg pain and swelling. Patient initially attributed pain and swelling to suspected musculoskeletal injury. Pt reports pain persisted for a few days and self resolved. Patient presented to PCP  3/29 and an outpatient ultrasound of RLE was ordered. US performed 4/5 revealed a non-occlusive thrombus with partial compression in the mid SFV. At the time of my exam, pain to RLE has entirely resolved. Pt also reports improved swelling to RLE. Patient denies difficulty with ambulation, chest pain, SOB, abdominal pain, N/V/D, fever, chills, and recent illness. Patient  denies history of DVT/PE.      In the ED, HDS without tachycardia or hypoxia. Labs stable.    * No surgery found *      Hospital Course:   Pt admitted to observation for acute DVT of RLE found on outpatient ultrasound 4/5. HDS on admission without tachycardia or hypoxia. Labs stable. Pt discharged home with Rx for Eliquis. PCP f/u as outpatient.       * Acute deep vein thrombosis (DVT) of femoral vein of right lower extremity    - Referred by PCP to the ED for DVT found on ultrasound 4/5  - DVT likely occurred 3 weeks ago s/p "jumped over a puddle" with associated RLE pain and swelling  - US performed 4/5 revealed a non-occlusive thrombus with partial compression in the mid SFV  - Pain entirely " resolved and swelling improved on admit per patient  - Patient denies all other complaints, including history of DVT/PE  - In the ED, HDS. No tachycardia or hypoxia. Labs stable  - Discussed risks/benefits of anticoagulation. Provided patient with Rx for Eliquis to begin tonight. Educated on how to take medication and answered all questions. Patient voiced understanding.   - Discharged home in stable condition to follow up with PCP as outpatient          Final Active Diagnoses:    Diagnosis Date Noted POA    PRINCIPAL PROBLEM:  Acute deep vein thrombosis (DVT) of femoral vein of right lower extremity [I82.411] 04/06/2018 Yes      Problems Resolved During this Admission:    Diagnosis Date Noted Date Resolved POA       Discharged Condition: stable    Disposition: Home or Self Care    Follow Up:  Follow-up Information     Call Navin Mcfadden MD.    Specialty:  Family Medicine  Contact information:  [address]  [address]  [facility] [address]  [phone]                 Patient Instructions:     Activity as tolerated     Notify your health care provider if you experience any of the following:  temperature >100.4     Notify your health care provider if you experience any of the following:  severe uncontrolled pain     Notify your health care provider if you experience any of the following:  redness, tenderness, or signs of infection (pain, swelling, redness, odor or green/yellow discharge around incision site)     Notify your health care provider if you experience any of the following:  difficulty breathing or increased cough         Significant Diagnostic Studies: Radiology: Ultrasound: RLE    Pending Diagnostic Studies:     None         Medications:  Reconciled Home Medications:      Medication List      START taking these medications    apixaban 5 mg Tab  Take 10 mg (2 tablets) twice a day for 7 days then 5 mg (1 tablet) twice a day.        CONTINUE taking these medications    levocetirizine 5 MG  tablet  Commonly known as:  XYZAL  Take 1 tablet (5 mg total) by mouth every evening.     triamcinolone acetonide 0.1% 0.1 % cream  Commonly known as:  KENALOG  Apply topically 2 (two) times daily. Use along waistline           Where to Get Your Medications      These medications were sent to [facility] [address] [place], [place] [address] [facility] AT [address] [address]    Phone:  [phone]   · levocetirizine 5 MG tablet  · triamcinolone acetonide 0.1% 0.1 % cream     You can get these medications from any pharmacy    Bring a paper prescription for each of these medications  · apixaban 5 mg Tab         Indwelling Lines/Drains at time of discharge:   Lines/Drains/Airways          No matching active lines, drains, or airways          Time spent on the discharge of patient: 30 minutes  Patient was seen and examined on the date of discharge and determined to be suitable for discharge.         Shalonda Mcrae PA-C  Department of Hospital Medicine  [facility]  Discussed with staff: Dr. Mejia

## 2018-04-06 NOTE — ASSESSMENT & PLAN NOTE
"- Referred by PCP to the ED for DVT found on ultrasound 4/5  - DVT likely occurred 3 weeks ago s/p "jumped over a puddle" with associated RLE pain and swelling  - US performed 4/5 revealed a non-occlusive thrombus with partial compression in the mid SFV  - Pain entirely resolved and swelling improved on admit per patient  - Patient denies all other complaints, including history of DVT/PE  - In the ED, HDS. No tachycardia or hypoxia. Labs stable  - Discussed risks/benefits of anticoagulation. Provided patient with Rx for Eliquis to begin tonight. Educated on how to take medication and answered all questions. Patient voiced understanding.   - Discharged home in stable condition to follow up with PCP as outpatient  "

## 2018-04-06 NOTE — ED NOTES
Pt was sent here by PMD for + blood clot in the R leg. Pt denies pain, SOB, or CP at this time.  Pt has swelling to the R lower extremity.

## 2018-06-28 ENCOUNTER — LAB VISIT (OUTPATIENT)
Dept: LAB | Facility: HOSPITAL | Age: 53
End: 2018-06-28
Attending: FAMILY MEDICINE
Payer: COMMERCIAL

## 2018-06-28 DIAGNOSIS — Z00.00 ANNUAL PHYSICAL EXAM: ICD-10-CM

## 2018-06-28 LAB
ALBUMIN SERPL BCP-MCNC: 3.6 G/DL
ALP SERPL-CCNC: 44 U/L
ALT SERPL W/O P-5'-P-CCNC: 33 U/L
ANION GAP SERPL CALC-SCNC: 6 MMOL/L
AST SERPL-CCNC: 30 U/L
BASOPHILS # BLD AUTO: 0.08 K/UL
BASOPHILS NFR BLD: 1.4 %
BILIRUB SERPL-MCNC: 0.4 MG/DL
BUN SERPL-MCNC: 10 MG/DL
CALCIUM SERPL-MCNC: 8.9 MG/DL
CHLORIDE SERPL-SCNC: 106 MMOL/L
CHOLEST SERPL-MCNC: 166 MG/DL
CHOLEST/HDLC SERPL: 4.7 {RATIO}
CO2 SERPL-SCNC: 26 MMOL/L
CREAT SERPL-MCNC: 0.9 MG/DL
DIFFERENTIAL METHOD: ABNORMAL
EOSINOPHIL # BLD AUTO: 0.3 K/UL
EOSINOPHIL NFR BLD: 4.9 %
ERYTHROCYTE [DISTWIDTH] IN BLOOD BY AUTOMATED COUNT: 14.7 %
EST. GFR  (AFRICAN AMERICAN): >60 ML/MIN/1.73 M^2
EST. GFR  (NON AFRICAN AMERICAN): >60 ML/MIN/1.73 M^2
GLUCOSE SERPL-MCNC: 101 MG/DL
HCT VFR BLD AUTO: 40.6 %
HDLC SERPL-MCNC: 35 MG/DL
HDLC SERPL: 21.1 %
HGB BLD-MCNC: 12.7 G/DL
IMM GRANULOCYTES # BLD AUTO: 0.06 K/UL
IMM GRANULOCYTES NFR BLD AUTO: 1 %
LDLC SERPL CALC-MCNC: 112.6 MG/DL
LYMPHOCYTES # BLD AUTO: 2.7 K/UL
LYMPHOCYTES NFR BLD: 47.3 %
MCH RBC QN AUTO: 23.6 PG
MCHC RBC AUTO-ENTMCNC: 31.3 G/DL
MCV RBC AUTO: 76 FL
MONOCYTES # BLD AUTO: 0.6 K/UL
MONOCYTES NFR BLD: 9.6 %
NEUTROPHILS # BLD AUTO: 2.1 K/UL
NEUTROPHILS NFR BLD: 35.8 %
NONHDLC SERPL-MCNC: 131 MG/DL
NRBC BLD-RTO: 0 /100 WBC
PLATELET # BLD AUTO: 349 K/UL
PMV BLD AUTO: 10.6 FL
POTASSIUM SERPL-SCNC: 4.3 MMOL/L
PROT SERPL-MCNC: 6.9 G/DL
RBC # BLD AUTO: 5.38 M/UL
SODIUM SERPL-SCNC: 138 MMOL/L
TRIGL SERPL-MCNC: 92 MG/DL
TSH SERPL DL<=0.005 MIU/L-ACNC: 0.85 UIU/ML
WBC # BLD AUTO: 5.73 K/UL

## 2018-06-28 PROCEDURE — 36415 COLL VENOUS BLD VENIPUNCTURE: CPT | Mod: PO

## 2018-06-28 PROCEDURE — 80053 COMPREHEN METABOLIC PANEL: CPT

## 2018-06-28 PROCEDURE — 85025 COMPLETE CBC W/AUTO DIFF WBC: CPT

## 2018-06-28 PROCEDURE — 80061 LIPID PANEL: CPT

## 2018-06-28 PROCEDURE — 84443 ASSAY THYROID STIM HORMONE: CPT

## 2018-07-03 ENCOUNTER — OFFICE VISIT (OUTPATIENT)
Dept: FAMILY MEDICINE | Facility: CLINIC | Age: 53
End: 2018-07-03
Payer: COMMERCIAL

## 2018-07-03 VITALS
HEIGHT: 71 IN | SYSTOLIC BLOOD PRESSURE: 130 MMHG | WEIGHT: 311.94 LBS | DIASTOLIC BLOOD PRESSURE: 77 MMHG | TEMPERATURE: 97 F | BODY MASS INDEX: 43.67 KG/M2 | HEART RATE: 69 BPM

## 2018-07-03 DIAGNOSIS — Z00.00 ROUTINE GENERAL MEDICAL EXAMINATION AT A HEALTH CARE FACILITY: Primary | ICD-10-CM

## 2018-07-03 DIAGNOSIS — I82.411 ACUTE DEEP VEIN THROMBOSIS (DVT) OF FEMORAL VEIN OF RIGHT LOWER EXTREMITY: ICD-10-CM

## 2018-07-03 DIAGNOSIS — E66.01 MORBID OBESITY WITH BMI OF 40.0-44.9, ADULT: ICD-10-CM

## 2018-07-03 PROCEDURE — 99999 PR PBB SHADOW E&M-EST. PATIENT-LVL III: CPT | Mod: PBBFAC,,, | Performed by: FAMILY MEDICINE

## 2018-07-03 PROCEDURE — 99396 PREV VISIT EST AGE 40-64: CPT | Mod: S$GLB,,, | Performed by: FAMILY MEDICINE

## 2018-07-03 NOTE — PROGRESS NOTES
Subjective:       Patient ID: Topher Cr is a 52 y.o. male.    Chief Complaint: Annual Exam    Disclaimer: This note has been generated using voice-recognition software. There may be typographical errors that have been missed during proof-reading    Pt presents for routine exam, last exam one year ago  Seen about 3 months ago with diagnosis of DVT, presently on Eliquis without side effects.  Denies leg pain or swelling  Immunizations:  UTD  Colonoscopy:  2015      Review of Systems   Constitutional: Negative for activity change, appetite change, chills, fatigue, fever and unexpected weight change.   HENT: Negative for congestion, ear discharge, ear pain, hearing loss and sinus pressure.    Eyes: Negative for pain and visual disturbance.   Respiratory: Negative for cough, chest tightness and shortness of breath.    Cardiovascular: Negative for chest pain, palpitations and leg swelling.   Gastrointestinal: Negative for abdominal distention and abdominal pain.   Genitourinary: Negative for difficulty urinating, dysuria, frequency and hematuria.   Musculoskeletal: Negative for arthralgias, back pain, gait problem, joint swelling, myalgias, neck pain and neck stiffness.   Skin: Negative for rash.   Neurological: Negative for dizziness, tremors, speech difficulty, weakness, numbness and headaches.   Psychiatric/Behavioral: Negative for agitation and behavioral problems.       Objective:      Physical Exam   Constitutional: He is oriented to person, place, and time. He appears well-developed and well-nourished.   HENT:   Head: Normocephalic.   Right Ear: Tympanic membrane and external ear normal.   Left Ear: Tympanic membrane and external ear normal.   Nose: Nose normal.   Mouth/Throat: Uvula is midline, oropharynx is clear and moist and mucous membranes are normal.   Eyes: Conjunctivae and EOM are normal. Pupils are equal, round, and reactive to light.   Neck: Normal range of motion. Neck supple. No JVD present. Carotid  bruit is not present. No thyroid mass and no thyromegaly present.   Cardiovascular: Normal rate, regular rhythm and normal heart sounds.    No murmur heard.  Pulmonary/Chest: Effort normal and breath sounds normal. He has no rales.   Abdominal: Soft. Bowel sounds are normal. He exhibits no mass. There is no tenderness. Hernia confirmed negative in the right inguinal area and confirmed negative in the left inguinal area.   Genitourinary: Testes normal and penis normal. Right testis shows no mass and no tenderness. Left testis shows no mass and no tenderness.   Musculoskeletal: Normal range of motion. He exhibits no edema.   Negative Homans  No calf tenderness   Lymphadenopathy:     He has no cervical adenopathy. No inguinal adenopathy noted on the right or left side.   Neurological: He is alert and oriented to person, place, and time. He has normal reflexes. No cranial nerve deficit.   Skin: Skin is warm. No lesion and no rash noted.   Psychiatric: He has a normal mood and affect.       Assessment:       1.  Physical exam  2.  S/p DVT, Eliquis x 3 months  3.  Morbid obesity  Plan:       1.  Labs reviewed with pt  2.  Encourage weight loss 3.  Repeat venous US

## 2018-07-12 ENCOUNTER — HOSPITAL ENCOUNTER (OUTPATIENT)
Dept: VASCULAR SURGERY | Facility: CLINIC | Age: 53
Discharge: HOME | End: 2018-07-12
Attending: FAMILY MEDICINE
Payer: COMMERCIAL

## 2018-07-12 DIAGNOSIS — I82.411 ACUTE DEEP VEIN THROMBOSIS (DVT) OF FEMORAL VEIN OF RIGHT LOWER EXTREMITY: ICD-10-CM

## 2018-07-12 PROCEDURE — 93971 EXTREMITY STUDY: CPT | Mod: S$GLB,,, | Performed by: SURGERY

## 2018-07-18 ENCOUNTER — PATIENT MESSAGE (OUTPATIENT)
Dept: FAMILY MEDICINE | Facility: CLINIC | Age: 53
End: 2018-07-18

## 2018-07-18 NOTE — TELEPHONE ENCOUNTER
Patient's chart updated to reflect the following:        Comments     triamcinolone acetonide 0.1% 0.1 % cream 4/6/2018 Topher Cox

## 2018-10-25 ENCOUNTER — OFFICE VISIT (OUTPATIENT)
Dept: FAMILY MEDICINE | Facility: CLINIC | Age: 53
End: 2018-10-25
Payer: COMMERCIAL

## 2018-10-25 VITALS
TEMPERATURE: 98 F | WEIGHT: 315 LBS | SYSTOLIC BLOOD PRESSURE: 121 MMHG | RESPIRATION RATE: 20 BRPM | DIASTOLIC BLOOD PRESSURE: 70 MMHG | HEIGHT: 71 IN | BODY MASS INDEX: 44.1 KG/M2

## 2018-10-25 DIAGNOSIS — R10.13 EPIGASTRIC ABDOMINAL PAIN: Primary | ICD-10-CM

## 2018-10-25 DIAGNOSIS — E66.01 MORBID OBESITY WITH BMI OF 40.0-44.9, ADULT: ICD-10-CM

## 2018-10-25 PROCEDURE — 99214 OFFICE O/P EST MOD 30 MIN: CPT | Mod: S$GLB,,, | Performed by: FAMILY MEDICINE

## 2018-10-25 PROCEDURE — 3008F BODY MASS INDEX DOCD: CPT | Mod: CPTII,S$GLB,, | Performed by: FAMILY MEDICINE

## 2018-10-25 PROCEDURE — 99999 PR PBB SHADOW E&M-EST. PATIENT-LVL III: CPT | Mod: PBBFAC,,, | Performed by: FAMILY MEDICINE

## 2018-10-25 RX ORDER — OMEPRAZOLE 20 MG/1
20 CAPSULE, DELAYED RELEASE ORAL DAILY
Qty: 30 CAPSULE | Refills: 0 | Status: SHIPPED | OUTPATIENT
Start: 2018-10-25 | End: 2018-11-08

## 2018-10-25 NOTE — PATIENT INSTRUCTIONS
Try to decrease the  triggers of heartburn which include - alcohol,   Tobacco, caffeine, spicy foods, fatty foods, eating large meals before lying down.     Also taking an antiinflammatory ( like Aspirin, Advil (ibuprofen), Alleve (naproxen), Mobic, Aspirin 325mg )  daily can worsen Heartburn or Reflux (GERD)    Call  & let me know  if symptoms worsen or persist after taking prescription medication - OMEPRAZOLE (Prilosec) for one - four weeks    If you are not better, we may consider referral to gastroenterology for further testing or  EGD

## 2018-11-05 ENCOUNTER — TELEPHONE (OUTPATIENT)
Dept: GASTROENTEROLOGY | Facility: CLINIC | Age: 53
End: 2018-11-05

## 2018-11-05 ENCOUNTER — HOSPITAL ENCOUNTER (EMERGENCY)
Facility: HOSPITAL | Age: 53
Discharge: HOME | End: 2018-11-05
Attending: EMERGENCY MEDICINE
Payer: COMMERCIAL

## 2018-11-05 ENCOUNTER — TELEPHONE (OUTPATIENT)
Dept: FAMILY MEDICINE | Facility: CLINIC | Age: 53
End: 2018-11-05

## 2018-11-05 VITALS
WEIGHT: 311 LBS | BODY MASS INDEX: 43.54 KG/M2 | HEART RATE: 85 BPM | HEIGHT: 71 IN | TEMPERATURE: 98 F | OXYGEN SATURATION: 96 % | RESPIRATION RATE: 20 BRPM | DIASTOLIC BLOOD PRESSURE: 65 MMHG | SYSTOLIC BLOOD PRESSURE: 148 MMHG

## 2018-11-05 DIAGNOSIS — K65.4 MESENTERIC PANNICULITIS: ICD-10-CM

## 2018-11-05 DIAGNOSIS — R10.9 ABDOMINAL PAIN, UNSPECIFIED ABDOMINAL LOCATION: Primary | ICD-10-CM

## 2018-11-05 LAB
ALBUMIN SERPL BCP-MCNC: 4 G/DL
ALP SERPL-CCNC: 49 U/L
ALT SERPL W/O P-5'-P-CCNC: 44 U/L
ANION GAP SERPL CALC-SCNC: 8 MMOL/L
AST SERPL-CCNC: 40 U/L
BASOPHILS # BLD AUTO: 0.02 K/UL
BASOPHILS NFR BLD: 0.3 %
BILIRUB SERPL-MCNC: 0.5 MG/DL
BILIRUB UR QL STRIP: NEGATIVE
BUN SERPL-MCNC: 9 MG/DL
CALCIUM SERPL-MCNC: 9 MG/DL
CHLORIDE SERPL-SCNC: 103 MMOL/L
CLARITY UR REFRACT.AUTO: CLEAR
CO2 SERPL-SCNC: 24 MMOL/L
COLOR UR AUTO: YELLOW
CREAT SERPL-MCNC: 0.9 MG/DL
DIFFERENTIAL METHOD: ABNORMAL
EOSINOPHIL # BLD AUTO: 0.1 K/UL
EOSINOPHIL NFR BLD: 1.6 %
ERYTHROCYTE [DISTWIDTH] IN BLOOD BY AUTOMATED COUNT: 14.6 %
EST. GFR  (AFRICAN AMERICAN): >60 ML/MIN/1.73 M^2
EST. GFR  (NON AFRICAN AMERICAN): >60 ML/MIN/1.73 M^2
GLUCOSE SERPL-MCNC: 106 MG/DL
GLUCOSE UR QL STRIP: NEGATIVE
HCT VFR BLD AUTO: 43.4 %
HGB BLD-MCNC: 13.3 G/DL
HGB UR QL STRIP: ABNORMAL
HYALINE CASTS UR QL AUTO: 1 /LPF
IMM GRANULOCYTES # BLD AUTO: 0.08 K/UL
IMM GRANULOCYTES NFR BLD AUTO: 1.2 %
KETONES UR QL STRIP: NEGATIVE
LACTATE SERPL-SCNC: 1.5 MMOL/L
LEUKOCYTE ESTERASE UR QL STRIP: NEGATIVE
LIPASE SERPL-CCNC: 18 U/L
LYMPHOCYTES # BLD AUTO: 1.1 K/UL
LYMPHOCYTES NFR BLD: 16 %
MCH RBC QN AUTO: 22.6 PG
MCHC RBC AUTO-ENTMCNC: 30.6 G/DL
MCV RBC AUTO: 74 FL
MICROSCOPIC COMMENT: NORMAL
MONOCYTES # BLD AUTO: 0.5 K/UL
MONOCYTES NFR BLD: 6.9 %
NEUTROPHILS # BLD AUTO: 5.1 K/UL
NEUTROPHILS NFR BLD: 74 %
NITRITE UR QL STRIP: NEGATIVE
NRBC BLD-RTO: 0 /100 WBC
PH UR STRIP: 5 [PH] (ref 5–8)
PLATELET # BLD AUTO: 369 K/UL
PMV BLD AUTO: 9.4 FL
POTASSIUM SERPL-SCNC: 4.1 MMOL/L
PROT SERPL-MCNC: 8 G/DL
PROT UR QL STRIP: NEGATIVE
RBC # BLD AUTO: 5.89 M/UL
RBC #/AREA URNS AUTO: 1 /HPF (ref 0–4)
SODIUM SERPL-SCNC: 135 MMOL/L
SP GR UR STRIP: 1.02 (ref 1–1.03)
SQUAMOUS #/AREA URNS AUTO: 1 /HPF
URN SPEC COLLECT METH UR: ABNORMAL
WBC # BLD AUTO: 6.93 K/UL
WBC #/AREA URNS AUTO: 1 /HPF (ref 0–5)

## 2018-11-05 PROCEDURE — 99284 EMERGENCY DEPT VISIT MOD MDM: CPT | Mod: ,,, | Performed by: EMERGENCY MEDICINE

## 2018-11-05 PROCEDURE — 63600175 PHARM REV CODE 636 W HCPCS: Performed by: EMERGENCY MEDICINE

## 2018-11-05 PROCEDURE — 25000003 PHARM REV CODE 250: Performed by: EMERGENCY MEDICINE

## 2018-11-05 PROCEDURE — 85025 COMPLETE CBC W/AUTO DIFF WBC: CPT

## 2018-11-05 PROCEDURE — 83605 ASSAY OF LACTIC ACID: CPT

## 2018-11-05 PROCEDURE — 99284 EMERGENCY DEPT VISIT MOD MDM: CPT | Mod: 25

## 2018-11-05 PROCEDURE — 96375 TX/PRO/DX INJ NEW DRUG ADDON: CPT

## 2018-11-05 PROCEDURE — 96361 HYDRATE IV INFUSION ADD-ON: CPT

## 2018-11-05 PROCEDURE — 81001 URINALYSIS AUTO W/SCOPE: CPT

## 2018-11-05 PROCEDURE — 80053 COMPREHEN METABOLIC PANEL: CPT

## 2018-11-05 PROCEDURE — 96374 THER/PROPH/DIAG INJ IV PUSH: CPT

## 2018-11-05 PROCEDURE — 83690 ASSAY OF LIPASE: CPT

## 2018-11-05 RX ORDER — ONDANSETRON 4 MG/1
4 TABLET, FILM COATED ORAL EVERY 6 HOURS PRN
Qty: 8 TABLET | Refills: 0 | Status: SHIPPED | OUTPATIENT
Start: 2018-11-05 | End: 2019-01-14

## 2018-11-05 RX ORDER — ONDANSETRON 2 MG/ML
4 INJECTION INTRAMUSCULAR; INTRAVENOUS
Status: COMPLETED | OUTPATIENT
Start: 2018-11-05 | End: 2018-11-05

## 2018-11-05 RX ORDER — HYDROMORPHONE HYDROCHLORIDE 1 MG/ML
0.5 INJECTION, SOLUTION INTRAMUSCULAR; INTRAVENOUS; SUBCUTANEOUS
Status: COMPLETED | OUTPATIENT
Start: 2018-11-05 | End: 2018-11-05

## 2018-11-05 RX ADMIN — HYDROMORPHONE HYDROCHLORIDE 0.5 MG: 1 INJECTION, SOLUTION INTRAMUSCULAR; INTRAVENOUS; SUBCUTANEOUS at 06:11

## 2018-11-05 RX ADMIN — SODIUM CHLORIDE 1000 ML: 0.9 INJECTION, SOLUTION INTRAVENOUS at 06:11

## 2018-11-05 RX ADMIN — ONDANSETRON 4 MG: 2 INJECTION INTRAMUSCULAR; INTRAVENOUS at 06:11

## 2018-11-05 NOTE — TELEPHONE ENCOUNTER
----- Message from Nury Dorado sent at 11/5/2018  9:47 AM CST -----  Contact: Wife Dajuan Cell# [phone]  Patient's wife would like a call back to speak with you about her husbands Er visit on last week.

## 2018-11-05 NOTE — ED NOTES
Care assumed. Pt. Resting in bed in NAD. RR e/u. Continuous BP, and O2 monitoring in progress. VS being monitoring continuously per MD orders. Pt. Offered bathroom assistance and denies need at this time. Explanation of care/wait provided. Bed in low, locked position with rails up and call bell in reach. Will continue to monitor.

## 2018-11-05 NOTE — PROVIDER PROGRESS NOTES - EMERGENCY DEPT.
Encounter Date: 11/5/2018    ED Physician Progress Notes             Received pt at signout from Dr. Galvan    54 yo M presents with nausea and epigastric abdominal pain. Patient received Zofran.  Awaiting labs and imaging.    Reassessment:  Serum labs without leukocytosis.  Normal LFTs.  Normal lipase.  No significant abnormalities.  Urinalysis is clear.  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  On repeat assessment, patient reports pain resolved.  He is able to tolerate p.o. at this time.  Abdomen soft and nontender.  Advised follow-up with GI for investigation of mesenteric panniculitis.  Additionally provided with ambulatory referral for derm evaluation of chronic rash. Patient will be discharged on short course of Zofran.  Provided with return precautions.

## 2018-11-05 NOTE — ED PROVIDER NOTES
Encounter Date: 11/5/2018    SCRIBE #1 NOTE: I, Scott Mason, am scribing for, and in the presence of, Timo Galvan MD.     I, Dr. Shivam Galvan, personally performed the services described in this documentation. All medical record entries made by the scribe were at my direction and in my presence.  I have reviewed the chart and agree that the record reflects my personal performance and is accurate and complete.  Shivam Galvan MD.  6:24 PM 11/05/2018      History     Chief Complaint   Patient presents with    Abdominal Pain     Pt reports umbillical region abd pain that began last week. Pt states he has seen a doctor for pain and was given medicaiton that has not been helping. Pt doesnt know medications names. Pt reports n/v/d, denies fevers.     53 year old male with PMHx of HLD and obesity presents to the ED with complaints of epigastric abd pain that started one week ago. Patient endorses one episode of vomiting and diarrhea PTA. No exacerbating or mitigating factors. Patient denies fever, chills, CP, SOB, or any other complaints at this time.          Review of patient's allergies indicates:   Allergen Reactions    Cucumber fruit extract Hives and Itching     Past Medical History:   Diagnosis Date    DJD (degenerative joint disease)     Hyperlipidemia     Morbid obesity with BMI of 40.0-44.9, adult 3/29/2018    Obesity      Past Surgical History:   Procedure Laterality Date    COLONOSCOPY N/A 8/6/2015    Performed by Ariel [facility]aez MD at [facility] (4TH FLR)    KNEE SURGERY Right      Family History   Problem Relation Age of Onset    Asthma Mother     Heart disease Father      Social History     Tobacco Use    Smoking status: Never Smoker    Smokeless tobacco: Never Used   Substance Use Topics    Alcohol use: No     Alcohol/week: 0.0 oz    Drug use: No     Review of Systems   Constitutional: Negative for fever.   HENT: Negative for sore throat.    Respiratory: Negative for shortness of breath.     Cardiovascular: Negative for chest pain.   Gastrointestinal: Positive for abdominal pain, diarrhea, nausea and vomiting.   Genitourinary: Negative for dysuria.   Musculoskeletal: Negative for back pain.   Skin: Negative for rash.   Neurological: Negative for weakness.   Hematological: Does not bruise/bleed easily.   All other systems reviewed and are negative.      Physical Exam     Initial Vitals [11/05/18 0457]   BP Pulse Resp Temp SpO2   (!) 140/78 100 20 98.2 °F (36.8 °C) 99 %      MAP       --         Physical Exam    Nursing note and vitals reviewed.    Constitutional: Patient appears well-developed and well-nourished.   HENT:   Head: Normocephalic and atraumatic.   Eyes: Conjunctivae and EOM are normal. Pupils are equal, round, and reactive to light.   Neck: Normal range of motion. Neck supple. No tracheal deviation present.   Cardiovascular: Normal rate, regular rhythm, normal heart sounds and intact distal pulses.   Pulmonary/Chest: Breath sounds normal. No respiratory distress. Patient has no wheezes, rhonchi, or rales. Patient exhibits no tenderness.   Abdominal: Epigastric tenderness to palpitation. Soft. Bowel sounds are normal. Patient exhibits no distension and no mass. There is no rebound and no guarding.   Musculoskeletal: Normal range of motion. Patient exhibits no edema or tenderness.   Neurological: Patient is alert and oriented to person, place, and time. Patient has normal strength and normal reflexes. Patient displays normal reflexes. No cranial nerve deficit or sensory deficit.   Skin: Skin is warm and dry. Capillary refill takes less than 2 seconds.   Psychiatric: Patient has a normal mood and affect. Their behavior is normal. Judgment and thought content normal.    ED Course   Procedures  Labs Reviewed   CBC W/ AUTO DIFFERENTIAL   COMPREHENSIVE METABOLIC PANEL   LIPASE   URINALYSIS   LACTIC ACID, PLASMA          Imaging Results    None          Medical Decision Making:   ED  Management:  Patient's care was handed over to and physician pending laboratory work and imaging studies.  Patient reported improved symptoms after receiving medicine for pain and nausea.                      Clinical Impression:   The primary encounter diagnosis was Abdominal pain, unspecified abdominal location. 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                             Timo Galvan MD  11/05/18 6016

## 2018-11-05 NOTE — TELEPHONE ENCOUNTER
----- Message from Phoebe De La Cruz sent at 11/5/2018  9:53 AM CST -----  Contact: wife - alonso breen - [phone]  [facility] - is asking for appt sooner than 1/2 - please call wife - alonso nuha - [phone]

## 2018-11-06 ENCOUNTER — INITIAL CONSULT (OUTPATIENT)
Dept: DERMATOLOGY | Facility: CLINIC | Age: 53
End: 2018-11-06
Payer: COMMERCIAL

## 2018-11-06 DIAGNOSIS — L50.9 URTICARIA: ICD-10-CM

## 2018-11-06 DIAGNOSIS — L30.4 INTERTRIGO: ICD-10-CM

## 2018-11-06 DIAGNOSIS — L28.0 LSC (LICHEN SIMPLEX CHRONICUS): ICD-10-CM

## 2018-11-06 DIAGNOSIS — L98.9 DERMATOSIS: Primary | ICD-10-CM

## 2018-11-06 PROCEDURE — 87220 TISSUE EXAM FOR FUNGI: CPT | Mod: S$GLB,,, | Performed by: NURSE PRACTITIONER

## 2018-11-06 PROCEDURE — 11900 INJECT SKIN LESIONS </W 7: CPT | Mod: S$GLB,,, | Performed by: NURSE PRACTITIONER

## 2018-11-06 PROCEDURE — 99203 OFFICE O/P NEW LOW 30 MIN: CPT | Mod: 25,S$GLB,, | Performed by: NURSE PRACTITIONER

## 2018-11-06 PROCEDURE — 99999 PR PBB SHADOW E&M-EST. PATIENT-LVL III: CPT | Mod: PBBFAC,,, | Performed by: NURSE PRACTITIONER

## 2018-11-06 RX ORDER — LEVOCETIRIZINE DIHYDROCHLORIDE 5 MG/1
TABLET, FILM COATED ORAL
Qty: 30 TABLET | Refills: 1 | Status: SHIPPED | OUTPATIENT
Start: 2018-11-06 | End: 2019-01-14

## 2018-11-06 RX ORDER — KETOCONAZOLE 20 MG/G
CREAM TOPICAL
Qty: 60 G | Refills: 3 | Status: SHIPPED | OUTPATIENT
Start: 2018-11-06 | End: 2019-11-27

## 2018-11-06 RX ORDER — HYDROXYZINE HYDROCHLORIDE 25 MG/1
25 TABLET, FILM COATED ORAL NIGHTLY
Qty: 30 TABLET | Refills: 2 | Status: SHIPPED | OUTPATIENT
Start: 2018-11-06 | End: 2018-12-06

## 2018-11-06 RX ORDER — CLOBETASOL PROPIONATE 0.46 MG/ML
SOLUTION TOPICAL
Qty: 50 ML | Refills: 3 | Status: SHIPPED | OUTPATIENT
Start: 2018-11-06 | End: 2019-11-27

## 2018-11-06 NOTE — PROGRESS NOTES
Subjective:       Patient ID:  Topher Cr is a 53 y.o. male who presents for   Chief Complaint   Patient presents with    Itching     all over body, very itchy , prev tx didn't help      Itching  - Initial  Affected locations: torso, left lower leg, right lower leg, left upper leg, right arm, left arm, abdomen, left elbow and right elbow (all over )  Duration: 6 months  Signs / symptoms: itching  Severity: mild to moderate  Timing: intermittent  Aggravated by: nothing  Relieving factors/Treatments tried: Rx topical steroids and antihistamines (xyzal, TAC per pcp, dove soap or oil of olay, jergens lotion. Denies hot showers.  tide washing detergent, downy fabric softener,. drier sheets)  Improvement on treatment: no relief        Review of Systems   Constitutional: Negative for fever, chills, weight loss, weight gain, fatigue, night sweats and malaise.   Skin: Positive for itching.        Denies h/o atop derm      Hematologic/Lymphatic: Does not bruise/bleed easily.   Allergic/Immunologic: Negative for environmental allergies.        Objective:    Physical Exam   Constitutional: He appears well-developed and well-nourished. He is obese.  No distress.   Neurological: He is alert and oriented to person, place, and time. He is not disoriented.   Psychiatric: He has a normal mood and affect.   Skin:   Areas Examined (abnormalities noted in diagram):   Head / Face Inspection Performed  Neck Inspection Performed  Chest / Axilla Inspection Performed  Abdomen Inspection Performed  Genitals / Buttocks / Groin Inspection Performed  Back Inspection Performed  RUE Inspected  LUE Inspection Performed  RLE Inspected  LLE Inspection Performed              Diagram Legend     Erythematous scaling macule/papule c/w actinic keratosis       Vascular papule c/w angioma      Pigmented verrucoid papule/plaque c/w seborrheic keratosis      Yellow umbilicated papule c/w sebaceous hyperplasia      Irregularly shaped tan macule c/w  lentigo     1-2 mm smooth white papules consistent with Milia      Movable subcutaneous cyst with punctum c/w epidermal inclusion cyst      Subcutaneous movable cyst c/w pilar cyst      Firm pink to brown papule c/w dermatofibroma      Pedunculated fleshy papule(s) c/w skin tag(s)      Evenly pigmented macule c/w junctional nevus     Mildly variegated pigmented, slightly irregular-bordered macule c/w mildly atypical nevus      Flesh colored to evenly pigmented papule c/w intradermal nevus       Pink pearly papule/plaque c/w basal cell carcinoma      Erythematous hyperkeratotic cursted plaque c/w SCC      Surgical scar with no sign of skin cancer recurrence      Open and closed comedones      Inflammatory papules and pustules      Verrucoid papule consistent consistent with wart     Erythematous eczematous patches and plaques     Dystrophic onycholytic nail with subungual debris c/w onychomycosis     Umbilicated papule    Erythematous-base heme-crusted tan verrucoid plaque consistent with inflamed seborrheic keratosis     Erythematous Silvery Scaling Plaque c/w Psoriasis     See annotation                Assessment / Plan:        Dermatosis, NOS  ICD vs. ACD vs. other  -KOH  Consider biopsy at follow if no relief  -     levocetirizine (XYZAL) 5 MG tablet; 1 tab PO q DAY  Dispense: 30 tablet; Refill: 1  -     hydrOXYzine HCl (ATARAX) 25 MG tablet; Take 1 tablet (25 mg total) by mouth every evening. Prn pruritus. Do not drive or operate machinery while taking this medicine.  Dispense: 30 tablet; Refill: 2  -     clobetasol (TEMOVATE) 0.05 % external solution; Pt to mix in 1 jar of cerave cream and apply to thighs BID  Dispense: 50 mL; Refill: 3    Good skin care regimen discussed including limiting to one bath or shower/day, using lukewarm water with mild soap and moisturizing cream to skin 1 - 2x/day. Brochure was provided and reviewed with patient.    Intertrigo- abdomen/brooklynn pubis  -     ketoconazole (NIZORAL) 2 %  cream; AAA bid abdomen  Dispense: 60 g; Refill: 3    Cool blow dry after showering. Once clear, use Zeasorb AF powder for maintenance.    Urticaria  -     Ambulatory referral to Allergy    LSC (lichen simplex chronicus)  Discussed good skin care regimen including using a mild soap and moisturizing cream 1 - 2 times per day. Limit to one bath/shower per day and use lukewarm (not hot) water.   Discussed with patient the importance of not manipulating skin lesions. Trauma often exacerbates condition. Trimming nails is recommended to avoid puncturing skin.   Use ice to relieve intense pruritus.    Intralesional Kenalog 5mg/cc (5 cc total) injected into 5 lesions on the left thigh & buttocks today after obtaining verbal consent including risk of surrounding hypopigmentation. Patient tolerated procedure well.    Units: 1  NDC for Kenalog 10mg/cc:  4447-8976-72                 Follow-up in about 1 month (around 12/6/2018).

## 2018-11-06 NOTE — LETTER
November 6, 2018      Sriram Wilson MD  [address] [facility] [id number]           [facility] - Dermatology  [address]  [place] [address]  Phone: [phone]  Fax: [phone]          Patient: Topher Cr   MR Number: [id number]   YOB: 1965   Date of Visit: 11/6/2018       Dear Dr. Sriram Wilson:    Thank you for referring Topher Cr to me for evaluation. Attached you will find relevant portions of my assessment and plan of care.    If you have questions, please do not hesitate to call me. I look forward to following Topher Cr along with you.    Sincerely,    Joy Briones, NP    Enclosure  CC:  No Recipients    If you would like to receive this communication electronically, please contact [email or web address] or [phone] to request more information on [employer] Link access.    For providers and/or their staff who would like to refer a patient to [facility], please contact us through our one-stop-shop provider referral line, [facility] , at [phone].    If you feel you have received this communication in error or would no longer like to receive these types of communications, please e-mail [email or web address]

## 2018-11-08 ENCOUNTER — OFFICE VISIT (OUTPATIENT)
Dept: GASTROENTEROLOGY | Facility: CLINIC | Age: 53
End: 2018-11-08
Payer: COMMERCIAL

## 2018-11-08 ENCOUNTER — LAB VISIT (OUTPATIENT)
Dept: LAB | Facility: HOSPITAL | Age: 53
End: 2018-11-08
Payer: COMMERCIAL

## 2018-11-08 VITALS
SYSTOLIC BLOOD PRESSURE: 139 MMHG | WEIGHT: 307.13 LBS | HEIGHT: 71 IN | HEART RATE: 76 BPM | BODY MASS INDEX: 43 KG/M2 | DIASTOLIC BLOOD PRESSURE: 87 MMHG

## 2018-11-08 DIAGNOSIS — R10.13 EPIGASTRIC ABDOMINAL PAIN: ICD-10-CM

## 2018-11-08 DIAGNOSIS — K65.4 MESENTERIC PANNICULITIS: ICD-10-CM

## 2018-11-08 DIAGNOSIS — R19.7 DIARRHEA, UNSPECIFIED TYPE: ICD-10-CM

## 2018-11-08 DIAGNOSIS — R19.7 DIARRHEA, UNSPECIFIED TYPE: Primary | ICD-10-CM

## 2018-11-08 LAB
C DIFF GDH STL QL: NEGATIVE
C DIFF TOX A+B STL QL IA: NEGATIVE

## 2018-11-08 PROCEDURE — 87209 SMEAR COMPLEX STAIN: CPT

## 2018-11-08 PROCEDURE — 87427 SHIGA-LIKE TOXIN AG IA: CPT

## 2018-11-08 PROCEDURE — 87328 CRYPTOSPORIDIUM AG IA: CPT

## 2018-11-08 PROCEDURE — 87449 NOS EACH ORGANISM AG IA: CPT

## 2018-11-08 PROCEDURE — 99999 PR PBB SHADOW E&M-EST. PATIENT-LVL III: CPT | Mod: PBBFAC,,, | Performed by: NURSE PRACTITIONER

## 2018-11-08 PROCEDURE — 3008F BODY MASS INDEX DOCD: CPT | Mod: CPTII,S$GLB,, | Performed by: NURSE PRACTITIONER

## 2018-11-08 PROCEDURE — 99204 OFFICE O/P NEW MOD 45 MIN: CPT | Mod: S$GLB,,, | Performed by: NURSE PRACTITIONER

## 2018-11-08 PROCEDURE — 87046 STOOL CULTR AEROBIC BACT EA: CPT | Mod: 59

## 2018-11-08 PROCEDURE — 87045 FECES CULTURE AEROBIC BACT: CPT

## 2018-11-08 NOTE — PROGRESS NOTES
[facility] Note    Reason for Visit:  The primary encounter diagnosis was Diarrhea, unspecified type. Diagnoses of Epigastric abdominal pain and Mesenteric panniculitis were also pertinent to this visit.    PCP:   Navin Mcfadden   No address on file    Referring MD:  Aaareferral Self  No address on file    HPI:  This is a 53 y.o. male here for evaluation of abdominal pain   Seen in ED for this. Labs unrevealing. CT with mesenteric panniculitis.    Abdominal pain   ONSET:almost one month ago  LOCATION:epigastric  DURATION:constant  CHARACTER:ache  ASSOCIATED/ALLEVIATING/AGGRAVAITING: vomited Monday morning. Diarrhea since Sunday. No fevers. not worse with meals. No improvement with omeprazole 20 mg daily x 1 week. Worse b/f BM.  RADIATION:no  TEMPORAL:awakes from sleep at times  SEVERITY:8/10    Reflux - no  Dysphagia/odynophagia - no   Bowel habits - diarrhea since Sunday: 6 loose BM/day. Prior to this with 1 formed BM/day.  GI bleeding - denies hematochezia, hematemesis, melena, BRBPR, black/tarry stools, and coffee ground emesis  NSAID usage - none    ROS:  Constitutional: No fevers, no chills, No unintentional weight loss, +fatigue,   ENT: No allergies  CV: No chest pain, no palpitations, no perif. edema, no sob on exertion  Pulm: No cough, No shortness of breath, no wheezes, no sputum  Ophtho: No vision changes  GI: see HPI;   Derm: no rash. Itching-followed by derm  Heme: No lymphadenopathy, No bruising  MSK: No arthritis, no muscle pain, no muscle weakness  : No dysuria, No hematuria  Endo: No hot or cold intolerance  Neuro: No syncope, No seizure,       Medical History:  has a past medical history of Blood clotting tendency, DJD (degenerative joint disease), Hyperlipidemia, Morbid obesity with BMI of 40.0-44.9, adult (3/29/2018), and Obesity.    Surgical History:  has a past surgical history that includes Knee surgery (Right) and COLONOSCOPY (N/A, 8/6/2015).    Family History:  "family history includes Asthma in his mother; Heart disease in his father..     Social History:  reports that  has never smoked. he has never used smokeless tobacco. He reports that he does not drink alcohol or use drugs.    Review of patient's allergies indicates:   Allergen Reactions    Cucumber fruit extract Hives and Itching       Current Outpatient Medications   Medication Sig    clobetasol (TEMOVATE) 0.05 % external solution Pt to mix in 1 jar of cerave cream and apply to thighs BID    hydrOXYzine HCl (ATARAX) 25 MG tablet Take 1 tablet (25 mg total) by mouth every evening. Prn pruritus. Do not drive or operate machinery while taking this medicine.    ketoconazole (NIZORAL) 2 % cream AAA bid abdomen    levocetirizine (XYZAL) 5 MG tablet 1 tab PO q DAY    ondansetron (ZOFRAN) 4 MG tablet Take 1 tablet (4 mg total) by mouth every 6 (six) hours as needed for Nausea.     No current facility-administered medications for this visit.        Objective Findings:    Vital Signs:  /87   Pulse 76   Ht 5' 11" (1.803 m)   Wt (!) 139.3 kg (307 lb 1.6 oz)   BMI 42.83 kg/m²   Body mass index is 42.83 kg/m².    Physical Exam:  General Appearance: Well appearing in no acute distress  Head:   Normocephalic, without obvious abnormality  Eyes:    No scleral icterus, EOMI  ENT: Neck supple, Lips, mucosa, and tongue normal; teeth and gums normal  Lungs: CTA bilaterally in anterior and posterior fields, no wheezes, no crackles.  Heart:  Regular rate and rhythm, S1, S2 normal, no murmurs heard  Abdomen: Soft, non tender, non distended with positive bowel sounds in all four quadrants. No hepatosplenomegaly, ascites, or mass  Extremities: 2+ radial pulses, no clubbing, cyanosis or edema  Skin: No rash to exposed areas  Neurologic: A&Ox4      Labs:  Lab Results   Component Value Date    WBC 6.93 11/05/2018    HGB 13.3 (L) 11/05/2018    HCT 43.4 11/05/2018     (H) 11/05/2018    CHOL 166 06/28/2018    TRIG 92 " 06/28/2018    HDL 35 (L) 06/28/2018    ALT 44 11/05/2018    AST 40 11/05/2018     (L) 11/05/2018    K 4.1 11/05/2018     11/05/2018    CREATININE 0.9 11/05/2018    BUN 9 11/05/2018    CO2 24 11/05/2018    TSH 0.850 06/28/2018    PSA 0.25 07/12/2016    INR 1.0 04/06/2018    HGBA1C 5.8 (H) 01/10/2018       Imaging:  CT  Abd/pelvis 11/5/18: Mild mesenteric fat stranding about the mesenteric root, which is a nonspecific finding.  Findings can be seen with mesenteric panniculitis.  Clinical correlation advised.  Endoscopy:    EGD-none  colonoscopy 8/6/2015: [facility]. Nl colon. Rpt 10 yrs  Assessment:  1. Diarrhea, unspecified type    2. Epigastric abdominal pain    3. Mesenteric panniculitis           Recommendations:  1. Diarrhea- stools r/o infection, then colonoscopy.   2. Epigastric abd pain- EGD. Ibgard.   3. Mesenteric panniculitis- colonoscopy, will consider referral to general sx for possible biopsy pending results and pt status.     F/u pending results.       Order summary:  Orders Placed This Encounter    Clostridium difficile EIA    Stool culture    Stool Exam-Ova,Cysts,Parasites    Giardia / Cryptosporidum, EIA    Case request GI: EGD (ESOPHAGOGASTRODUODENOSCOPY), COLONOSCOPY         Thank you so much for allowing me to participate in the care of Topher Arias, APRN, FNP-C

## 2018-11-09 LAB
CRYPTOSP AG STL QL IA: NEGATIVE
E COLI SXT1 STL QL IA: NEGATIVE
E COLI SXT2 STL QL IA: NEGATIVE
G LAMBLIA AG STL QL IA: NEGATIVE
O+P STL TRI STN: NORMAL

## 2018-11-12 LAB — BACTERIA STL CULT: NORMAL

## 2018-12-07 ENCOUNTER — OFFICE VISIT (OUTPATIENT)
Dept: DERMATOLOGY | Facility: CLINIC | Age: 53
End: 2018-12-07
Payer: COMMERCIAL

## 2018-12-07 DIAGNOSIS — L28.0 LSC (LICHEN SIMPLEX CHRONICUS): ICD-10-CM

## 2018-12-07 DIAGNOSIS — L50.9 URTICARIA: ICD-10-CM

## 2018-12-07 DIAGNOSIS — L30.4 INTERTRIGO: ICD-10-CM

## 2018-12-07 DIAGNOSIS — L98.9 DERMATOSIS: Primary | ICD-10-CM

## 2018-12-07 PROCEDURE — 99213 OFFICE O/P EST LOW 20 MIN: CPT | Mod: S$GLB,,, | Performed by: NURSE PRACTITIONER

## 2018-12-07 PROCEDURE — 99999 PR PBB SHADOW E&M-EST. PATIENT-LVL II: CPT | Mod: PBBFAC,,, | Performed by: NURSE PRACTITIONER

## 2018-12-07 NOTE — PROGRESS NOTES
"  Subjective:       Patient ID:  Topher Cr is a 53 y.o. male who presents for   Chief Complaint   Patient presents with    Itching     follow-up , itchy      Itching  - Follow-up  Symptom course: improving  Currently using: xyzal in AM, clob in cerave cream 1/day "when remembers"   Affected locations: torso, right lower leg, left lower leg, left arm, right arm, left elbow, right elbow and abdomen (all over )  Signs / symptoms: itching  Severity: mild to moderate    Intertrigo  - Follow-up  Symptom course: improving  Currently using: ketoconazole cream BID.  Affected locations: abdomen  Signs / symptoms: asymptomatic      Patient denies being seen by allergy.  Also reports has noticed when he eats certain foods, he's more itchy  Denies any hives today    Review of Systems   Constitutional: Negative for fever, chills, weight loss, weight gain, fatigue, night sweats and malaise.   Skin: Positive for itching.        Denies h/o atop derm      Hematologic/Lymphatic: Does not bruise/bleed easily.   Allergic/Immunologic: Negative for environmental allergies.        Objective:    Physical Exam   Constitutional: He appears well-developed and well-nourished. He is obese.  No distress.   Neurological: He is alert and oriented to person, place, and time. He is not disoriented.   Psychiatric: He has a normal mood and affect.   Skin:   Areas Examined (abnormalities noted in diagram):   Head / Face Inspection Performed  Neck Inspection Performed  Chest / Axilla Inspection Performed  Abdomen Inspection Performed  Genitals / Buttocks / Groin Inspection Performed  Back Inspection Performed  RUE Inspected  LUE Inspection Performed  RLE Inspected  LLE Inspection Performed              Diagram Legend     Erythematous scaling macule/papule c/w actinic keratosis       Vascular papule c/w angioma      Pigmented verrucoid papule/plaque c/w seborrheic keratosis      Yellow umbilicated papule c/w sebaceous hyperplasia      Irregularly " shaped tan macule c/w lentigo     1-2 mm smooth white papules consistent with Milia      Movable subcutaneous cyst with punctum c/w epidermal inclusion cyst      Subcutaneous movable cyst c/w pilar cyst      Firm pink to brown papule c/w dermatofibroma      Pedunculated fleshy papule(s) c/w skin tag(s)      Evenly pigmented macule c/w junctional nevus     Mildly variegated pigmented, slightly irregular-bordered macule c/w mildly atypical nevus      Flesh colored to evenly pigmented papule c/w intradermal nevus       Pink pearly papule/plaque c/w basal cell carcinoma      Erythematous hyperkeratotic cursted plaque c/w SCC      Surgical scar with no sign of skin cancer recurrence      Open and closed comedones      Inflammatory papules and pustules      Verrucoid papule consistent consistent with wart     Erythematous eczematous patches and plaques     Dystrophic onycholytic nail with subungual debris c/w onychomycosis     Umbilicated papule    Erythematous-base heme-crusted tan verrucoid plaque consistent with inflamed seborrheic keratosis     Erythematous Silvery Scaling Plaque c/w Psoriasis     See annotation      Assessment / Plan:        Dermatosis, NOS  ACD vs ICD vs AD- improving  Continue with Clob in cerave cream, discussed decreasing to plain cerave w/o clob solution    Continue good skin regimen  Continue Antihistamines daily    Intertrigo- resolved  Ok to use ketoconazole cream PRN    LSC (lichen simplex chronicus)- improving  Discussed with patient the importance of not manipulating skin lesions. Trauma often exacerbates condition. Trimming nails is recommended to avoid puncturing skin.     Urticaria  -     Ambulatory referral to Allergy             Follow-up in about 3 months (around 3/7/2019).

## 2018-12-24 ENCOUNTER — OFFICE VISIT (OUTPATIENT)
Dept: URGENT CARE | Facility: CLINIC | Age: 53
End: 2018-12-24
Payer: COMMERCIAL

## 2018-12-24 VITALS
WEIGHT: 307 LBS | SYSTOLIC BLOOD PRESSURE: 108 MMHG | HEART RATE: 62 BPM | RESPIRATION RATE: 16 BRPM | OXYGEN SATURATION: 98 % | BODY MASS INDEX: 42.98 KG/M2 | TEMPERATURE: 98 F | DIASTOLIC BLOOD PRESSURE: 73 MMHG | HEIGHT: 71 IN

## 2018-12-24 DIAGNOSIS — S63.601A: Primary | ICD-10-CM

## 2018-12-24 DIAGNOSIS — S63.619A: Primary | ICD-10-CM

## 2018-12-24 PROCEDURE — 3008F BODY MASS INDEX DOCD: CPT | Mod: CPTII,S$GLB,, | Performed by: EMERGENCY MEDICINE

## 2018-12-24 PROCEDURE — 99214 OFFICE O/P EST MOD 30 MIN: CPT | Mod: S$GLB,,, | Performed by: EMERGENCY MEDICINE

## 2018-12-24 PROCEDURE — 73140 X-RAY EXAM OF FINGER(S): CPT | Mod: RT,S$GLB,, | Performed by: RADIOLOGY

## 2018-12-24 RX ORDER — IBUPROFEN 600 MG/1
600 TABLET ORAL EVERY 6 HOURS PRN
Qty: 20 TABLET | Refills: 0 | Status: SHIPPED | OUTPATIENT
Start: 2018-12-24 | End: 2018-12-29

## 2018-12-24 NOTE — PATIENT INSTRUCTIONS
X-RAY NEGATIVE FOR FRACTURE  REST AND ICE AND ELEVATE AND SPLINT AND ACE WRAP AS INSTRUCTED FOR COMFORT.  X-RAY REPORT GIVEN TO YOU  IBUPROFEN 600 MG PRESCRIPTION SENT TO THE PHARMACY.  FOLLOW UP WITH ORTHOPEDIC DOCTOR IF HAVING PERSISTENT PROBLEMS OR POPPING IN THE FUTURE.    REVIEW SPRAIN THUMB OR FINGER SHEET.    RETURN FOR ANY CONCERNS OR PROBLEMS.      Finger Sprain  A sprain is a stretching or tearing of the ligaments that hold a joint together. There are no broken bones. Sprains take 3 to 6 weeks to heal.  A sprained finger may be treated with a splint or buddy tape. This is when you tape the injured finger to the one next to it for support. Minor sprains may require no additional support.  Home care  · Keep your hand elevated to reduce pain and swelling. This is very important during the first 48 hours.  · Apply an ice pack over the injured area for 15 to 20 minutes every 3 to 6 hours. You should do this for the first 24 to 48 hours. You can make an ice pack by filling a plastic bag that seals at the top with ice cubes and then wrapping it with a thin towel. Continue the use of ice packs for relief of pain and swelling as needed. As the ice melts, be careful to avoid getting any wrap or splint wet. After 48 hours, apply heat (warm shower or warm bath) for 15 to 20 minutes several times a day, or alternate ice and heat.  · If buddy tape was applied and it becomes wet or dirty, change it. You may replace it with paper, plastic or cloth tape. Cloth tape and paper tapes must be kept dry. Apply gauze or cotton padding between the fingers, especially at the webbed space. This will help prevent the skin from getting moist and breaking down. Keep the buddy tape in place for at least 4 weeks, or as instructed by your healthcare provider.  · If a splint was applied, wear it for the time advised.  · You may use over-the-counter pain medicine to control pain, unless another pain medicine was prescribed. If you have  chronic liver or kidney disease or ever had a stomach ulcer or GI bleeding, talk with your healthcare provider before using these medicines.  Follow-up care  Follow up with your healthcare provider as directed. Finger joints will become stiff if immobile for too long. If a splint was applied, ask your healthcare provider when it is safe to begin range-of-motion exercises.  Sometimes fractures dont show up on the first X-ray. Bruises and sprains can sometimes hurt as much as a fracture. These injuries can take time to heal completely. If your symptoms dont improve or they get worse, talk with your healthcare provider. You may need a repeat X-ray. If X-rays were taken, you will be told of any new findings that may affect your care.  When to seek medical advice  Call your healthcare provider right away if any of these occur:  · Pain or swelling increases  · Fingers or hand becomes cold, blue, numb, or tingly  Date Last Reviewed: 11/20/2015  © 2728-8593 [employer]. [address] [place] [address], [place] [address]. All rights reserved. This information is not intended as a substitute for professional medical care. Always follow your healthcare professional's instructions.        ACE Wrap  Minor muscle or joint injuries are often treated with an elastic bandage. The bandage provides support and compression to the injured area. An elastic bandage is a stretchy, rolled bandage. Elastic bandages range in width from 2 to 6 inches. They can be used for a variety of injuries. The bandages are often called ACE bandages, after the most common brand name.  If used correctly, elastic bandages help control swelling and ease pain. An elastic bandage is also a good reminder not to overuse the injured area. However, elastic bandages do not provide a lot of support and will not prevent reinjury.  Home care    To apply an elastic bandage:  · Check the skin before wrapping the injury. It should be clean, dry, and free of  drainage.  · Start wrapping below the injury and work your way toward the body. For an ankle sprain, start wrapping around the foot and work up toward the calf. This will help control swelling.  · Overlap the edges of the bandage so it stays snuggly in place.  · Wrap the bandage firmly, but not too tightly. A tight bandage can increase swelling on either end of the bandage. Make sure the bandage is wrinkle free.  · Leave fingers and toes exposed.  · Secure ends of the bandage (even self-sticking ones) with clips or tape.  · Check frequently to ensure adequate circulation, especially in the fingers and toes. Loosen the bandage if there is local swelling, numbness, tingling, discomfort, coldness, or discoloration (skin pale or bluish in color).  · Rewrap the bandage as needed during the day. Reroll the bandage as you unwind it.  Continue using the elastic bandage until the pain and swelling are gone or as your healthcare provider advises.  If you have been told to ice the area, the ice can be secured in place with the elastic bandage. Wrap the ice pack with a thin towel to protect the skin. Do not put ice or an ice pack directly on the skin.  Ice the area for no more than 20 minutes at a time.    Follow-up care  Follow up with your healthcare provider, as advised.  When to seek medical advice  Call your healthcare provider for any of the following:  · Pain and swelling that doesn't get better or gets worse  · Trouble moving injured area  · Skin discoloration, numbness, or tingling that doesnt go away after bandage is removed  Date Last Reviewed: 9/13/2015  © 9814-9792 [employer]. [address] [place] [address], [place] [address]. All rights reserved. This information is not intended as a substitute for professional medical care. Always follow your healthcare professional's instructions.

## 2018-12-24 NOTE — PROGRESS NOTES
"Subjective:       Patient ID: Topher Cr is a 53 y.o. male.    Vitals:    12/24/18 1213   BP: 108/73   Pulse: 62   Resp: 16   Temp: 97.6 °F (36.4 °C)   TempSrc: Oral   SpO2: 98%   Weight: (!) 139.3 kg (307 lb)   Height: 5' 11" (1.803 m)       Chief Complaint: Hand Pain (right thumb)    Pt c/o pain in his right thumb that started last Thursday.  Pt denies injury.  Pt states it "pops".  Pt has not taken anything for the pain. Patient states his daughter will grab his thumb and pole and twisted from time to time and it has been hurting for a week and that it popped back into place when held inflection for a long time it occasionally pops into extension.  He is describing a possible trigger finger into extension.  It is currently not popping.  Mild tenderness to palpation along the metacarpophalangeal joint and even less at the IP joint.  He states that his wife made him come here to get it checked out.      Hand Pain    The incident occurred 5 to 7 days ago. The incident occurred at home. There was no injury mechanism. The pain is present in the right hand. The quality of the pain is described as aching. The pain does not radiate. The pain is at a severity of 8/10. The pain is severe. The pain has been intermittent since the incident. Pertinent negatives include no chest pain, numbness or tingling. The symptoms are aggravated by movement and palpation. He has tried nothing for the symptoms. The treatment provided no relief.     Review of Systems   Constitution: Negative for chills and fever.   HENT: Negative for sore throat.    Eyes: Negative for blurred vision.   Cardiovascular: Negative for chest pain.   Respiratory: Negative for shortness of breath.    Skin: Negative for rash.   Musculoskeletal: Negative for back pain and joint pain.   Gastrointestinal: Negative for abdominal pain, diarrhea, nausea and vomiting.   Neurological: Negative for headaches, numbness and tingling.   Psychiatric/Behavioral: The patient is " not nervous/anxious.        Objective:      Physical Exam   Constitutional: He is oriented to person, place, and time. He appears well-developed and well-nourished.   HENT:   Head: Normocephalic and atraumatic. Head is without abrasion, without contusion and without laceration.   Eyes: Conjunctivae, EOM and lids are normal. Pupils are equal, round, and reactive to light.   Neck: Trachea normal, full passive range of motion without pain and phonation normal. Neck supple.   Cardiovascular: Normal rate, regular rhythm and normal heart sounds.   Pulmonary/Chest: Effort normal and breath sounds normal. No stridor.   Musculoskeletal: Normal range of motion. He exhibits edema and tenderness. He exhibits no deformity.   Mild tenderness to palpation of the right thumb or 1st metacarpophalangeal joint.  No erythema, minimal edema. There is no tenderness popping or trigger finger symptoms or signs at this time however the patient does described that as happening from time to time.   Neurological: He is alert and oriented to person, place, and time.   Skin: Skin is warm, dry and intact. Capillary refill takes less than 2 seconds. No abrasion, no bruising, no burn, no ecchymosis, no laceration, no lesion and no rash noted. No erythema.   Psychiatric: He has a normal mood and affect. His speech is normal. Cognition and memory are normal.   Nursing note and vitals reviewed.        Xray shows no fracture  Placed in splint  Rice therpay, ibuprofen 800 mg rx, and orthopedics follow up if not resolved or if having recurrent problems.    X-ray Finger 2 Or More Views Right    Result Date: 12/24/2018  EXAMINATION: XR FINGER 2 OR MORE VIEWS RIGHT CLINICAL HISTORY: Unspecified sprain of unspecified finger, initial encounter TECHNIQUE: Three views right thumb COMPARISON: None FINDINGS: Bones are well mineralized.  Alignment is within normal limits.  No displaced fracture, dislocation or destructive osseous process.  Mild degenerative change  at the 1st CMC, MCP and IP joints.  No subcutaneous emphysema or radiodense retained foreign body.     No acute displaced fracture-dislocation identified. Electronically signed by: Armani Church MD Date:    12/24/2018 Time:    12:47    Assessment:       1. Sprained finger and thumb, right, initial encounter        Plan:       Topher was seen today for hand pain.    Diagnoses and all orders for this visit:    Sprained finger and thumb, right, initial encounter  -     X-Ray Finger 2 or More Views Right; Future    Other orders  -     ibuprofen (ADVIL,MOTRIN) 600 MG tablet; Take 1 tablet (600 mg total) by mouth every 6 (six) hours as needed for Pain or Temperature greater than (100.4).          Patient Instructions   X-RAY NEGATIVE FOR FRACTURE  REST AND ICE AND ELEVATE AND SPLINT AND ACE WRAP AS INSTRUCTED FOR COMFORT.  X-RAY REPORT GIVEN TO YOU  IBUPROFEN 600 MG PRESCRIPTION SENT TO THE PHARMACY.  FOLLOW UP WITH ORTHOPEDIC DOCTOR IF HAVING PERSISTENT PROBLEMS OR POPPING IN THE FUTURE.    REVIEW SPRAIN THUMB OR FINGER SHEET.    RETURN FOR ANY CONCERNS OR PROBLEMS.      Finger Sprain  A sprain is a stretching or tearing of the ligaments that hold a joint together. There are no broken bones. Sprains take 3 to 6 weeks to heal.  A sprained finger may be treated with a splint or buddy tape. This is when you tape the injured finger to the one next to it for support. Minor sprains may require no additional support.  Home care  · Keep your hand elevated to reduce pain and swelling. This is very important during the first 48 hours.  · Apply an ice pack over the injured area for 15 to 20 minutes every 3 to 6 hours. You should do this for the first 24 to 48 hours. You can make an ice pack by filling a plastic bag that seals at the top with ice cubes and then wrapping it with a thin towel. Continue the use of ice packs for relief of pain and swelling as needed. As the ice melts, be careful to avoid getting any wrap or splint wet.  After 48 hours, apply heat (warm shower or warm bath) for 15 to 20 minutes several times a day, or alternate ice and heat.  · If jakob tape was applied and it becomes wet or dirty, change it. You may replace it with paper, plastic or cloth tape. Cloth tape and paper tapes must be kept dry. Apply gauze or cotton padding between the fingers, especially at the webbed space. This will help prevent the skin from getting moist and breaking down. Keep the jakob tape in place for at least 4 weeks, or as instructed by your healthcare provider.  · If a splint was applied, wear it for the time advised.  · You may use over-the-counter pain medicine to control pain, unless another pain medicine was prescribed. If you have chronic liver or kidney disease or ever had a stomach ulcer or GI bleeding, talk with your healthcare provider before using these medicines.  Follow-up care  Follow up with your healthcare provider as directed. Finger joints will become stiff if immobile for too long. If a splint was applied, ask your healthcare provider when it is safe to begin range-of-motion exercises.  Sometimes fractures dont show up on the first X-ray. Bruises and sprains can sometimes hurt as much as a fracture. These injuries can take time to heal completely. If your symptoms dont improve or they get worse, talk with your healthcare provider. You may need a repeat X-ray. If X-rays were taken, you will be told of any new findings that may affect your care.  When to seek medical advice  Call your healthcare provider right away if any of these occur:  · Pain or swelling increases  · Fingers or hand becomes cold, blue, numb, or tingly  Date Last Reviewed: 11/20/2015  © 5822-7475 [employer]. [address] [place] [address], [place] [address]. All rights reserved. This information is not intended as a substitute for professional medical care. Always follow your healthcare professional's instructions.        ACE Wrap  Minor muscle or  joint injuries are often treated with an elastic bandage. The bandage provides support and compression to the injured area. An elastic bandage is a stretchy, rolled bandage. Elastic bandages range in width from 2 to 6 inches. They can be used for a variety of injuries. The bandages are often called ACE bandages, after the most common brand name.  If used correctly, elastic bandages help control swelling and ease pain. An elastic bandage is also a good reminder not to overuse the injured area. However, elastic bandages do not provide a lot of support and will not prevent reinjury.  Home care    To apply an elastic bandage:  · Check the skin before wrapping the injury. It should be clean, dry, and free of drainage.  · Start wrapping below the injury and work your way toward the body. For an ankle sprain, start wrapping around the foot and work up toward the calf. This will help control swelling.  · Overlap the edges of the bandage so it stays snuggly in place.  · Wrap the bandage firmly, but not too tightly. A tight bandage can increase swelling on either end of the bandage. Make sure the bandage is wrinkle free.  · Leave fingers and toes exposed.  · Secure ends of the bandage (even self-sticking ones) with clips or tape.  · Check frequently to ensure adequate circulation, especially in the fingers and toes. Loosen the bandage if there is local swelling, numbness, tingling, discomfort, coldness, or discoloration (skin pale or bluish in color).  · Rewrap the bandage as needed during the day. Reroll the bandage as you unwind it.  Continue using the elastic bandage until the pain and swelling are gone or as your healthcare provider advises.  If you have been told to ice the area, the ice can be secured in place with the elastic bandage. Wrap the ice pack with a thin towel to protect the skin. Do not put ice or an ice pack directly on the skin.  Ice the area for no more than 20 minutes at a time.    Follow-up care  Follow  up with your healthcare provider, as advised.  When to seek medical advice  Call your healthcare provider for any of the following:  · Pain and swelling that doesn't get better or gets worse  · Trouble moving injured area  · Skin discoloration, numbness, or tingling that doesnt go away after bandage is removed  Date Last Reviewed: 9/13/2015  © 4122-2103 [employer]. [address] [place] [address], [place] [id number]. All rights reserved. This information is not intended as a substitute for professional medical care. Always follow your healthcare professional's instructions.

## 2019-01-03 ENCOUNTER — OFFICE VISIT (OUTPATIENT)
Dept: ALLERGY | Facility: CLINIC | Age: 54
End: 2019-01-03
Payer: COMMERCIAL

## 2019-01-03 VITALS
HEIGHT: 71 IN | SYSTOLIC BLOOD PRESSURE: 136 MMHG | DIASTOLIC BLOOD PRESSURE: 80 MMHG | BODY MASS INDEX: 44.1 KG/M2 | WEIGHT: 315 LBS

## 2019-01-03 DIAGNOSIS — L50.9 URTICARIA: Primary | ICD-10-CM

## 2019-01-03 PROCEDURE — 99999 PR PBB SHADOW E&M-EST. PATIENT-LVL III: CPT | Mod: PBBFAC,,, | Performed by: STUDENT IN AN ORGANIZED HEALTH CARE EDUCATION/TRAINING PROGRAM

## 2019-01-03 PROCEDURE — 3008F BODY MASS INDEX DOCD: CPT | Mod: CPTII,S$GLB,, | Performed by: STUDENT IN AN ORGANIZED HEALTH CARE EDUCATION/TRAINING PROGRAM

## 2019-01-03 PROCEDURE — 99999 PR PBB SHADOW E&M-EST. PATIENT-LVL III: ICD-10-PCS | Mod: PBBFAC,,, | Performed by: STUDENT IN AN ORGANIZED HEALTH CARE EDUCATION/TRAINING PROGRAM

## 2019-01-03 PROCEDURE — 99203 OFFICE O/P NEW LOW 30 MIN: CPT | Mod: S$GLB,,, | Performed by: STUDENT IN AN ORGANIZED HEALTH CARE EDUCATION/TRAINING PROGRAM

## 2019-01-03 PROCEDURE — 3008F PR BODY MASS INDEX (BMI) DOCUMENTED: ICD-10-PCS | Mod: CPTII,S$GLB,, | Performed by: STUDENT IN AN ORGANIZED HEALTH CARE EDUCATION/TRAINING PROGRAM

## 2019-01-03 PROCEDURE — 99203 PR OFFICE/OUTPT VISIT, NEW, LEVL III, 30-44 MIN: ICD-10-PCS | Mod: S$GLB,,, | Performed by: STUDENT IN AN ORGANIZED HEALTH CARE EDUCATION/TRAINING PROGRAM

## 2019-01-03 NOTE — PROGRESS NOTES
Allergy Clinic Note  [facility]    Subjective:      Patient ID: Topher Cr is a 53 y.o. male.    Chief Complaint: Allergic Reaction; Itching; and Urticaria      Referring Provider: Joy Briones    History of Present Illness:  53-year-old male with urticaria referred from Derm[facility]ology to assess allergic triggers.  He is here alone and he is a poor historian    Patient reports being followed at Dermatology by nurse practitioner Joy Briones for several years.  Her last note describes a variety of rashes, 1 of which is urticaria.  She is referring him to see if there is an allergic component.      He reports itching up to every day with occasional rashes.  The rash is usually occur on his bilateral flanks and rarely on the back of his legs.  He is unable to describe their appearance.  He has no photos.  He says he has been helped by serum be mixed with clobetasol but not helped by Xyzal.  Symptoms are perennial without variation.  There is no diurnal variation.  He is not suspicious of any particular foods or other substances. He has no other skin symptoms.    He admits to sinus in the summertime, not requiring therapy.  He denies any history of asthma or eczema.    Additional History:   Past medical history is significant for a markedly elevated BMI.  No Hx of ENT surgery. Family history is positive for fatal asthma in his mother.  A nonsmoker who  at 44) and a brother with childhood asthma.  Family history is negative for hives or eczema.. Client   reports that  has never smoked. he has never used smokeless tobacco.  Exposures are unremarkable.  No exposure to pets, smoke, mold, or unusual substances.     Patient Active Problem List   Diagnosis    Abnormal EKG    Back pain    Screening for colon cancer    Morbid obesity with BMI of 40.0-44.9, adult    Pain and swelling of right lower leg    Acute deep vein thrombosis (DVT) of femoral vein of right lower extremity     Current  "Outpatient Medications on File Prior to Visit   Medication Sig Dispense Refill    clobetasol (TEMOVATE) 0.05 % external solution Pt to mix in 1 jar of cerave cream and apply to thighs BID 50 mL 3    ketoconazole (NIZORAL) 2 % cream AAA bid abdomen 60 g 3    levocetirizine (XYZAL) 5 MG tablet 1 tab PO q DAY 30 tablet 1    ondansetron (ZOFRAN) 4 MG tablet Take 1 tablet (4 mg total) by mouth every 6 (six) hours as needed for Nausea. 8 tablet 0     No current facility-administered medications on file prior to visit.          Review of Systems   Constitutional: Negative for chills and fever.   HENT: Negative for ear discharge and nosebleeds.    Eyes: Negative for discharge and redness.   Respiratory: Negative for cough, hemoptysis, sputum production, wheezing and stridor.    Cardiovascular: Negative for chest pain and palpitations.   Gastrointestinal: Negative for blood in stool, melena and vomiting.   Genitourinary: Negative for hematuria.   Skin: Positive for itching and rash.   Neurological: Negative for seizures and loss of consciousness.       Objective:   /80 (BP Location: Left arm, Patient Position: Sitting, BP Method: X-Large (Manual))   Ht 5' 11" (1.803 m)   Wt (!) 148.9 kg (328 lb 4.2 oz)   BMI 45.78 kg/m²       Physical Exam   Constitutional: He is oriented to person, place, and time and well-developed, well-nourished, and in no distress.   Markedly elevated BMI   HENT:   Head: Normocephalic and atraumatic.   Nose: Nose normal.   Eyes: Conjunctivae are normal. No scleral icterus.   Neck: Neck supple.   Cardiovascular: Normal rate, regular rhythm and intact distal pulses.   Pulmonary/Chest: Effort normal. No stridor. No respiratory distress.   Abdominal: Soft. He exhibits distension.   Musculoskeletal: He exhibits no edema or deformity.   Neurological: He is alert and oriented to person, place, and time.   Skin: No rash noted. No erythema.   Psychiatric: Memory and affect normal.       Data:  " Recent labs have included normal CBC, CMP and TSH      Assessment:     1. Urticaria        Plan:     Topher was seen today for allergic reaction, itching and urticaria.    Diagnoses and all orders for this visit:    Urticaria  -     IgE; Future  -     Dermatophagoides Bessemer; Future  -     Dermatophagoides Pteronyssinus; Future  -     Bermuda; Future  -     Sony; Future  -     Camp; Future  -     English Plantain; Future  -     Oak Pecan; Future  -     Pecan; Future  -     Marsh Elder; Future  -     Ragweed; Future  -     Alternaria; Future  -     Aspergillus; Future  -     Cat; Future  -     Cockroach; Future  -     Dog; Future  -     Milk IgE; Future  -     Peanut IgE; Future  -     Shrimp IgE; Future  -     Soybean IgE; Future  -     Egg, white IgE; Future        Patient Instructions   Testing  Blood work for allergy testing today       Check portal in one week for results or call [phone]       Contact me with questions or concerns       I will contact you if anything needs immediate attention.        Treatment  Continue creams from Joy at Dermatology.    Return as needed or if any tests are positive.      Follow-up if needed or if any allergy tests are positive.    Isadora Gallegos MD

## 2019-01-03 NOTE — PATIENT INSTRUCTIONS
Testing  Blood work for allergy testing today       Check portal in one week for results or call [phone]       Contact me with questions or concerns       I will contact you if anything needs immediate attention.        Treatment  Continue creams from [facility] at [facility].    Return as needed or if any tests are positive.

## 2019-01-03 NOTE — LETTER
January 3, 2019      Joy Briones, NP  [address]  [facility] [address]           [facility]  [address]  [facility] [address]  Phone: [phone]  Fax: [phone]          Patient: Topher Cr   MR Number: [id number]   YOB: 1965   Date of Visit: 1/3/2019       Dear Joy Briones:    Thank you for referring Topher Cr to me for evaluation. Attached you will find relevant portions of my assessment and plan of care.    If you have questions, please do not hesitate to call me. I look forward to following Topher Cr along with you.    Sincerely,    Isadora Glalegos MD    Enclosure  CC:  No Recipients    If you would like to receive this communication electronically, please contact [email or web address] or [phone] to request more information on [facility] Link access.    For providers and/or their staff who would like to refer a patient to [facility], please contact us through our one-stop-shop provider referral line, [facility], at [phone].    If you feel you have received this communication in error or would no longer like to receive these types of communications, please e-mail [email or web address]

## 2019-01-14 ENCOUNTER — OFFICE VISIT (OUTPATIENT)
Dept: GASTROENTEROLOGY | Facility: CLINIC | Age: 54
End: 2019-01-14
Payer: COMMERCIAL

## 2019-01-14 VITALS — BODY MASS INDEX: 46.12 KG/M2 | WEIGHT: 315 LBS

## 2019-01-14 DIAGNOSIS — R19.7 DIARRHEA, UNSPECIFIED TYPE: Primary | ICD-10-CM

## 2019-01-14 PROCEDURE — 99214 PR OFFICE/OUTPT VISIT, EST, LEVL IV, 30-39 MIN: ICD-10-PCS | Mod: S$GLB,,, | Performed by: INTERNAL MEDICINE

## 2019-01-14 PROCEDURE — 99999 PR PBB SHADOW E&M-EST. PATIENT-LVL III: ICD-10-PCS | Mod: PBBFAC,,, | Performed by: INTERNAL MEDICINE

## 2019-01-14 PROCEDURE — 3008F PR BODY MASS INDEX (BMI) DOCUMENTED: ICD-10-PCS | Mod: CPTII,S$GLB,, | Performed by: INTERNAL MEDICINE

## 2019-01-14 PROCEDURE — 99999 PR PBB SHADOW E&M-EST. PATIENT-LVL III: CPT | Mod: PBBFAC,,, | Performed by: INTERNAL MEDICINE

## 2019-01-14 PROCEDURE — 3008F BODY MASS INDEX DOCD: CPT | Mod: CPTII,S$GLB,, | Performed by: INTERNAL MEDICINE

## 2019-01-14 PROCEDURE — 99214 OFFICE O/P EST MOD 30 MIN: CPT | Mod: S$GLB,,, | Performed by: INTERNAL MEDICINE

## 2019-01-14 RX ORDER — POLYETHYLENE GLYCOL 3350, SODIUM SULFATE, SODIUM CHLORIDE, POTASSIUM CHLORIDE, SODIUM ASCORBATE, AND ASCORBIC ACID 7.5-2.691G
2000 KIT ORAL ONCE
Qty: 2000 ML | Refills: 0 | Status: SHIPPED | OUTPATIENT
Start: 2019-01-14 | End: 2019-01-14

## 2019-01-14 NOTE — PATIENT INSTRUCTIONS
"MOVIPREP Instructions    You are scheduled for a colonoscopy with   on 2/15/19 at [facility]  To ensure that your test is accurate and complete, you MUST follow these instructions listed below.  If you have any questions, please call our office at [phone].  Plan on being at the hospital for your procedure for 3-4 hours.    1.  Follow a CLEAR LIQUID DIET for the entire day before your scheduled colonoscopy.  This means no solid food the entire day starting when you wake.  You may have as much of the clear liquids as you want throughout the day.   CLEAR LIQUID DIET:   - Avoid Red, Orange, Purple, and/or Blue food coloring   - NO DAIRY   - You can have:  Coffee with sugar (no creamer), tea, water, soda, apple or white grape juice, chicken or beef broth/bouillon (no meat, noodles, or veggies), green/yellow popsicles, green/yellow Jell-O, lemonade.    2.  AT 5 pm the evening before your colonoscopy, EMPTY ONE PACKET OF "A" AND ONE PACKET OF "B" (which is inside your Moviprep box) INTO THE CONTAINER PROVIDED INSIDE THE BOX.  ADD LUKEWARM WATER TO THE TOP LINE OF THE CONTAINER.  MIX WELL TO DISSOLVE, AND THEN DRINK THE MIXTURE OVER THE NEXT HOUR.  This is sometimes easier to drink if this solution is cold, so you can mix the solution a few hours ahead of time and place in the refrigerator prior to drinking.  You have to drink the solution within 24 hours of mixing it.  Do NOT put this solution over ice.  It IS ok to drink with a straw.    3.  The endoscopy department will call you 2 days before your colonoscopy to tell you the exact time to arrive, AND to tell you the exact time to drink the 2nd portion of your prep (which will be FIVE HOURS BEFORE YOUR ARRIVAL TIME).  At this time given to you, EMPTY ONE PACKET OF "A" AND ONE PACKET OF "B" (which is inside your Moviprep box) INTO THE CONTAINER PROVIDED INSIDE THE BOX.  ADD LUKEWARM WATER TO THE TOP LINE OF THE CONTAINER.  MIX WELL TO DISSOLVE, AND THEN " DRINK THE MIXTURE OVER THE NEXT HOUR.  This is sometimes easier to drink if this solution is cold, so you can mix the solution a few hours ahead of time and place in the refrigerator prior to drinking.  You have to drink the solution within 24 hours of mixing it.  Do NOT put this solution over ice.  It IS ok to drink with a straw. Once this is complete, you may not have ANYTHING else by mouth!    4.  You must have someone with you to DRIVE YOU HOME since you will be receiving IV sedation for the colonoscopy.    5.  It is ok to take your heart, blood pressure, and seizure medications in the morning of your test with a SIP of water.  Hold other medications until after your procedure.  Do NOT have anything else to eat or drink the morning of your colonoscopy.  It is ok to brush your teeth.    6.  If you are on blood thinners THAT YOU HAVE BEEN INSTRUCTED TO HOLD BY YOUR DOCTOR FOR THIS PROCEDURE, then do NOT take this the morning of your colonoscopy.  Do NOT stop these medications on your own, they must be approved to be held by your doctor.  Your colonoscopy can NOT be done if you are on these medications.  Examples of blood thinners include: Coumadin, Aggrenox, Plavix, Pradaxa, Reapro, Pletal, Xarelto, Ticagrelor, Brilinta, Eliquis, and high dose aspirin (325 mg).  You do not have to stop baby aspirin 81 mg.    7.  IF YOU ARE DIABETIC:  NO INSULIN OR ORAL MEDICATIONS THE MORNING OF THE COLONOSCOPY.  TAKE ONLY HALF THE DOSE OF YOUR INSULIN THE DAY BEFORE THE COLONOSCOPY.  DO NOT TAKE ANY ORAL DIABETIC MEDICATIONS THE DAY BEFORE THE COLONOSCOPY.  IF YOU ARE AN INSULIN DEPENDENT DIABETIC WITH UNSTABLE BLOOD SUGARDS, NOTIFY YOUR PRIMARY CARE PHYSICIAN FOR INSTRUCTIONS.

## 2019-01-14 NOTE — PROGRESS NOTES
Subjective:       Patient ID: Topher Cr is a 53 y.o. male.    Chief Complaint: Abdominal Pain; Gas; and Diarrhea    This is a 53-year-old male here for evaluation of changes in bowel habits.  Symptoms have been occurring for months described as intermittent loose stools associated with some abdominal cramping.  He notes increased gas as well. He describes the stools as loose and occurs about every other day.  No particular dietary relation.  No other exacerbating or relieving factors or other associated symptoms.  No changes in medications.  He denies nausea, vomiting. Recent allergy testing was also done. No benefit to Ibguard. Stool studies negative.     The following portions of the patient's history were reviewed and updated as appropriate: allergies, current medications, past family history, past medical history, past social history, past surgical history and problem list.    (Portions of this note were dictated using voice recognition software and may contain dictation related errors in spelling/grammar/syntax not found on text review)    HPI  Review of Systems   Constitutional: Negative for appetite change and unexpected weight change.   Cardiovascular: Negative for chest pain and palpitations.   Gastrointestinal: Positive for abdominal pain and diarrhea.       Objective:      Physical Exam   Constitutional: He is oriented to person, place, and time. He appears well-developed and well-nourished. No distress.   HENT:   Head: Normocephalic and atraumatic.   Nose: Nose normal.   Eyes: Conjunctivae and EOM are normal. No scleral icterus.   Neck: Normal range of motion. Neck supple. No thyromegaly present.   Cardiovascular: Normal rate, regular rhythm and normal heart sounds. Exam reveals no gallop and no friction rub.   Pulmonary/Chest: Effort normal and breath sounds normal. No respiratory distress. He has no wheezes.   Abdominal: Soft. Bowel sounds are normal. He exhibits no distension. There is no  tenderness.   Neurological: He is alert and oriented to person, place, and time.   Skin: Skin is warm and dry. No rash noted. He is not diaphoretic. No erythema.   Psychiatric: He has a normal mood and affect. His behavior is normal.   Nursing note and vitals reviewed.      Assessment:       1. Diarrhea, unspecified type        Plan:   1. EGD/Colonoscopy

## 2019-02-13 ENCOUNTER — TELEPHONE (OUTPATIENT)
Dept: ENDOSCOPY | Facility: HOSPITAL | Age: 54
End: 2019-02-13

## 2019-02-13 NOTE — TELEPHONE ENCOUNTER
Spoke with patient about arrival time @0800     NPO status reviewed: Patient must have nothing to eat after midnight.  Pt may have CLEAR liquids ONLY until completely NPO 3 hrs @ 0400    Medications: Do not take Insulin or oral diabetic medications the day of the procedure.  Take as prescribed: heart, seizure and blood pressure medication in the morning with a sip of water (less than an ounce).  Take any breathing medications and bring inhalers to hospital with you Leave all valuables and jewelry at home.     Wear comfortable clothes to procedure to change into hospital gown You cannot drive for 24 hours after your procedure because you will receive sedation for your procedure to make you comfortable.  A ride must be provided at discharge.

## 2019-02-15 ENCOUNTER — ANESTHESIA EVENT (OUTPATIENT)
Dept: ENDOSCOPY | Facility: HOSPITAL | Age: 54
End: 2019-02-15
Payer: COMMERCIAL

## 2019-02-15 ENCOUNTER — HOSPITAL ENCOUNTER (OUTPATIENT)
Facility: HOSPITAL | Age: 54
Discharge: HOME | End: 2019-02-15
Attending: INTERNAL MEDICINE | Admitting: INTERNAL MEDICINE
Payer: COMMERCIAL

## 2019-02-15 ENCOUNTER — ANESTHESIA (OUTPATIENT)
Dept: ENDOSCOPY | Facility: HOSPITAL | Age: 54
End: 2019-02-15
Payer: COMMERCIAL

## 2019-02-15 VITALS
TEMPERATURE: 98 F | HEIGHT: 71 IN | DIASTOLIC BLOOD PRESSURE: 84 MMHG | BODY MASS INDEX: 43.54 KG/M2 | SYSTOLIC BLOOD PRESSURE: 142 MMHG | WEIGHT: 311 LBS | OXYGEN SATURATION: 98 % | HEART RATE: 69 BPM | RESPIRATION RATE: 16 BRPM

## 2019-02-15 DIAGNOSIS — R19.7 DIARRHEA: ICD-10-CM

## 2019-02-15 PROCEDURE — 45380 PR COLONOSCOPY,BIOPSY: ICD-10-PCS | Mod: ,,, | Performed by: INTERNAL MEDICINE

## 2019-02-15 PROCEDURE — 37000009 HC ANESTHESIA EA ADD 15 MINS: Performed by: INTERNAL MEDICINE

## 2019-02-15 PROCEDURE — 88342 TISSUE SPECIMEN TO PATHOLOGY - SURGERY: ICD-10-PCS | Mod: 26,,, | Performed by: PATHOLOGY

## 2019-02-15 PROCEDURE — 45380 COLONOSCOPY AND BIOPSY: CPT | Mod: ,,, | Performed by: INTERNAL MEDICINE

## 2019-02-15 PROCEDURE — 88305 TISSUE EXAM BY PATHOLOGIST: CPT | Mod: 26,,, | Performed by: PATHOLOGY

## 2019-02-15 PROCEDURE — 88305 TISSUE SPECIMEN TO PATHOLOGY - SURGERY: ICD-10-PCS | Mod: 26,,, | Performed by: PATHOLOGY

## 2019-02-15 PROCEDURE — 63600175 PHARM REV CODE 636 W HCPCS: Performed by: NURSE ANESTHETIST, CERTIFIED REGISTERED

## 2019-02-15 PROCEDURE — 25000003 PHARM REV CODE 250: Performed by: INTERNAL MEDICINE

## 2019-02-15 PROCEDURE — 43239 PR EGD, FLEX, W/BIOPSY, SGL/MULTI: ICD-10-PCS | Mod: 51,,, | Performed by: INTERNAL MEDICINE

## 2019-02-15 PROCEDURE — 88305 TISSUE EXAM BY PATHOLOGIST: CPT | Performed by: PATHOLOGY

## 2019-02-15 PROCEDURE — 43239 EGD BIOPSY SINGLE/MULTIPLE: CPT | Mod: 51,,, | Performed by: INTERNAL MEDICINE

## 2019-02-15 PROCEDURE — 27201012 HC FORCEPS, HOT/COLD, DISP: Performed by: INTERNAL MEDICINE

## 2019-02-15 PROCEDURE — 37000008 HC ANESTHESIA 1ST 15 MINUTES: Performed by: INTERNAL MEDICINE

## 2019-02-15 PROCEDURE — 45380 COLONOSCOPY AND BIOPSY: CPT | Performed by: INTERNAL MEDICINE

## 2019-02-15 PROCEDURE — 88342 IMHCHEM/IMCYTCHM 1ST ANTB: CPT | Mod: 26,,, | Performed by: PATHOLOGY

## 2019-02-15 PROCEDURE — 43239 EGD BIOPSY SINGLE/MULTIPLE: CPT | Performed by: INTERNAL MEDICINE

## 2019-02-15 PROCEDURE — 88342 IMHCHEM/IMCYTCHM 1ST ANTB: CPT | Performed by: PATHOLOGY

## 2019-02-15 RX ORDER — LIDOCAINE HCL/PF 100 MG/5ML
SYRINGE (ML) INTRAVENOUS
Status: DISCONTINUED | OUTPATIENT
Start: 2019-02-15 | End: 2019-02-15

## 2019-02-15 RX ORDER — PROPOFOL 10 MG/ML
VIAL (ML) INTRAVENOUS
Status: DISCONTINUED | OUTPATIENT
Start: 2019-02-15 | End: 2019-02-15

## 2019-02-15 RX ORDER — SODIUM CHLORIDE 9 MG/ML
INJECTION, SOLUTION INTRAVENOUS CONTINUOUS
Status: DISCONTINUED | OUTPATIENT
Start: 2019-02-15 | End: 2019-02-15 | Stop reason: HOSPADM

## 2019-02-15 RX ORDER — PROPOFOL 10 MG/ML
VIAL (ML) INTRAVENOUS CONTINUOUS PRN
Status: DISCONTINUED | OUTPATIENT
Start: 2019-02-15 | End: 2019-02-15

## 2019-02-15 RX ORDER — SODIUM CHLORIDE 0.9 % (FLUSH) 0.9 %
3 SYRINGE (ML) INJECTION
Status: DISCONTINUED | OUTPATIENT
Start: 2019-02-15 | End: 2019-02-15 | Stop reason: HOSPADM

## 2019-02-15 RX ADMIN — PROPOFOL 70 MG: 10 INJECTION, EMULSION INTRAVENOUS at 09:02

## 2019-02-15 RX ADMIN — PROPOFOL 10 MG: 10 INJECTION, EMULSION INTRAVENOUS at 09:02

## 2019-02-15 RX ADMIN — PROPOFOL 200 MCG/KG/MIN: 10 INJECTION, EMULSION INTRAVENOUS at 09:02

## 2019-02-15 RX ADMIN — PROPOFOL 20 MG: 10 INJECTION, EMULSION INTRAVENOUS at 09:02

## 2019-02-15 RX ADMIN — SODIUM CHLORIDE: 0.9 INJECTION, SOLUTION INTRAVENOUS at 09:02

## 2019-02-15 RX ADMIN — LIDOCAINE HYDROCHLORIDE 75 MG: 20 INJECTION, SOLUTION INTRAVENOUS at 09:02

## 2019-02-15 NOTE — ANESTHESIA PREPROCEDURE EVALUATION
02/15/2019  Topher Cr is a 53 y.o., male for EGD and colonoscopy under MAC. Pt is morbidly obese with sleep apnea    Past Medical History:   Diagnosis Date    Allergy     Blood clotting tendency     DJD (degenerative joint disease)     Hyperlipidemia     Morbid obesity with BMI of 40.0-44.9, adult 3/29/2018    Obesity     Urticaria          Anesthesia Evaluation    I have reviewed the Patient Summary Reports.    I have reviewed the Nursing Notes.   I have reviewed the Medications.     Review of Systems  Social:  Non-Smoker, No Alcohol Use    Pulmonary:   Sleep Apnea        Physical Exam  General:  Morbid Obesity    Airway/Jaw/Neck:  Airway Findings: Mallampati: II      Chest/Lungs:  Chest/Lungs Clear    Heart/Vascular:  Heart Findings: Normal            Anesthesia Plan  Type of Anesthesia, risks & benefits discussed:  Anesthesia Type:  MAC  Patient's Preference:   Intra-op Monitoring Plan:   Intra-op Monitoring Plan Comments:   Post Op Pain Control Plan:   Post Op Pain Control Plan Comments:   Induction:    Beta Blocker:  Patient is not currently on a Beta-Blocker (No further documentation required).       Informed Consent: Patient understands risks and agrees with Anesthesia plan.  Questions answered. Anesthesia consent signed with patient.  ASA Score: 3     Day of Surgery Review of History & Physical:            Ready For Surgery From Anesthesia Perspective.

## 2019-02-15 NOTE — PROVATION PATIENT INSTRUCTIONS
Discharge Summary/Instructions after an Endoscopic Procedure  Patient Name: Topher Cr  Patient MRN: [id number]  Patient YOB: 1965  Friday, February 15, 2019  Boris Carey MD  RESTRICTIONS:  During your procedure today, you received medications for sedation.  These   medications may affect your judgment, balance and coordination.  Therefore,   for 24 hours, you have the following restrictions:   - DO NOT drive a car, operate machinery, make legal/financial decisions,   sign important papers or drink alcohol.    ACTIVITY:  Today: no heavy lifting, straining or running due to procedural   sedation/anesthesia.  The following day: return to full activity including work.  DIET:  Eat and drink normally unless instructed otherwise.     TREATMENT FOR COMMON SIDE EFFECTS:  - Mild abdominal pain, nausea, belching, bloating or excessive gas:  rest,   eat lightly and use a heating pad.  - Sore Throat: treat with throat lozenges and/or gargle with warm salt   water.  - Because air was used during the procedure, expelling large amounts of air   from your rectum or belching is normal.  - If a bowel prep was taken, you may not have a bowel movement for 1-3 days.    This is normal.  SYMPTOMS TO WATCH FOR AND REPORT TO YOUR PHYSICIAN:  1. Abdominal pain or bloating, other than gas cramps.  2. Chest pain.  3. Back pain.  4. Signs of infection such as: chills or fever occurring within 24 hours   after the procedure.  5. Rectal bleeding, which would show as bright red, maroon, or black stools.   (A tablespoon of blood from the rectum is not serious, especially if   hemorrhoids are present.)  6. Vomiting.  7. Weakness or dizziness.  GO DIRECTLY TO THE NEAREST EMERGENCY ROOM IF YOU HAVE ANY OF THE FOLLOWING:      Difficulty breathing              Chills and/or fever over 101 F   Persistent vomiting and/or vomiting blood   Severe abdominal pain   Severe chest pain   Black, tarry stools   Bleeding- more than one  tablespoon   Any other symptom or condition that you feel may need urgent attention  Your doctor recommends these additional instructions:  If any biopsies were taken, your doctors clinic will contact you in 1 to 2   weeks with any results.  - Discharge patient to home (via wheelchair).   - Patient has a contact number available for emergencies.  The signs and   symptoms of potential delayed complications were discussed with the   patient.  Return to normal activities tomorrow.  Written discharge   instructions were provided to the patient.   - Resume previous diet.   - Continue present medications.   - Await pathology results.   - Repeat colonoscopy within 1 year for screening purposes.  For questions, problems or results please call your physician - Boris Carey MD at Work:  [phone].  EMERGENCY PHONE NUMBER: [phone],  LAB RESULTS: [phone]  IF A COMPLICATION OR EMERGENCY SITUATION ARISES AND YOU ARE UNABLE TO REACH   YOUR PHYSICIAN - GO DIRECTLY TO THE EMERGENCY ROOM.  Boris Carey MD  2/15/2019 10:06:02 AM  This report has been verified and signed electronically.  [employer]ATION

## 2019-02-15 NOTE — TRANSFER OF CARE
"Anesthesia Transfer of Care Note    Patient: Topher Cr    Procedure(s) Performed: Procedure(s) (LRB):  ESOPHAGOGASTRODUODENOSCOPY (EGD) (N/A)  COLONOSCOPY (N/A)    Patient location: GI    Anesthesia Type: MAC    Transport from OR: Transported from OR on room air with adequate spontaneous ventilation    Post pain: adequate analgesia    Post assessment: no apparent anesthetic complications and tolerated procedure well    Post vital signs: stable    Level of consciousness: responds to stimulation and sedated    Nausea/Vomiting: no nausea/vomiting    Complications: none    Transfer of care protocol was followed      Last vitals:   Visit Vitals  BP (!) 151/75 (Patient Position: Lying)   Pulse 69   Temp 36.5 °C (97.7 °F) (Temporal)   Resp 18   Ht 5' 11" (1.803 m)   Wt (!) 141.1 kg (311 lb)   SpO2 98%   BMI 43.38 kg/m²     "

## 2019-02-15 NOTE — PROVATION PATIENT INSTRUCTIONS
Discharge Summary/Instructions after an Endoscopic Procedure  Patient Name: Topher Cr  Patient MRN: [id number]  Patient YOB: 1965  Friday, February 15, 2019  Boris Carey MD  RESTRICTIONS:  During your procedure today, you received medications for sedation.  These   medications may affect your judgment, balance and coordination.  Therefore,   for 24 hours, you have the following restrictions:   - DO NOT drive a car, operate machinery, make legal/financial decisions,   sign important papers or drink alcohol.    ACTIVITY:  Today: no heavy lifting, straining or running due to procedural   sedation/anesthesia.  The following day: return to full activity including work.  DIET:  Eat and drink normally unless instructed otherwise.     TREATMENT FOR COMMON SIDE EFFECTS:  - Mild abdominal pain, nausea, belching, bloating or excessive gas:  rest,   eat lightly and use a heating pad.  - Sore Throat: treat with throat lozenges and/or gargle with warm salt   water.  - Because air was used during the procedure, expelling large amounts of air   from your rectum or belching is normal.  - If a bowel prep was taken, you may not have a bowel movement for 1-3 days.    This is normal.  SYMPTOMS TO WATCH FOR AND REPORT TO YOUR PHYSICIAN:  1. Abdominal pain or bloating, other than gas cramps.  2. Chest pain.  3. Back pain.  4. Signs of infection such as: chills or fever occurring within 24 hours   after the procedure.  5. Rectal bleeding, which would show as bright red, maroon, or black stools.   (A tablespoon of blood from the rectum is not serious, especially if   hemorrhoids are present.)  6. Vomiting.  7. Weakness or dizziness.  GO DIRECTLY TO THE NEAREST EMERGENCY ROOM IF YOU HAVE ANY OF THE FOLLOWING:      Difficulty breathing              Chills and/or fever over 101 F   Persistent vomiting and/or vomiting blood   Severe abdominal pain   Severe chest pain   Black, tarry stools   Bleeding- more than one  tablespoon   Any other symptom or condition that you feel may need urgent attention  Your doctor recommends these additional instructions:  If any biopsies were taken, your doctors clinic will contact you in 1 to 2   weeks with any results.  - Discharge patient to home (via wheelchair).   - Patient has a contact number available for emergencies.  The signs and   symptoms of potential delayed complications were discussed with the   patient.  Return to normal activities tomorrow.  Written discharge   instructions were provided to the patient.   - Resume previous diet.   - Continue present medications.   - Await pathology results.  For questions, problems or results please call your physician - Boris Carey MD at Work:  [phone].  EMERGENCY PHONE NUMBER: [phone],  LAB RESULTS: [phone]  IF A COMPLICATION OR EMERGENCY SITUATION ARISES AND YOU ARE UNABLE TO REACH   YOUR PHYSICIAN - GO DIRECTLY TO THE EMERGENCY ROOM.  Boris Carey MD  2/15/2019 9:51:04 AM  This report has been verified and signed electronically.  [employer]OVATION

## 2019-02-18 ENCOUNTER — TELEPHONE (OUTPATIENT)
Dept: GASTROENTEROLOGY | Facility: CLINIC | Age: 54
End: 2019-02-18

## 2019-02-18 NOTE — TELEPHONE ENCOUNTER
Post procedure instructions. Repeat colonoscopy within 1 year for screening  purposes. Please place on recall.

## 2019-02-28 ENCOUNTER — TELEPHONE (OUTPATIENT)
Dept: GASTROENTEROLOGY | Facility: CLINIC | Age: 54
End: 2019-02-28

## 2019-02-28 NOTE — TELEPHONE ENCOUNTER
----- Message from Boris Carey MD sent at 2/28/2019 10:04 AM CST -----  Duodenal bx negative, gastric bx with inflammation but h.pylori negative. Random colon bx negative for ibd/microscopic colitis. I'd like to f/u with him in clinic

## 2019-04-10 ENCOUNTER — CLINICAL SUPPORT (OUTPATIENT)
Dept: CARDIOLOGY | Facility: CLINIC | Age: 54
End: 2019-04-10
Attending: FAMILY MEDICINE
Payer: COMMERCIAL

## 2019-04-10 ENCOUNTER — HOSPITAL ENCOUNTER (OUTPATIENT)
Dept: RADIOLOGY | Facility: HOSPITAL | Age: 54
Discharge: HOME | End: 2019-04-10
Attending: FAMILY MEDICINE
Payer: COMMERCIAL

## 2019-04-10 ENCOUNTER — TELEPHONE (OUTPATIENT)
Dept: FAMILY MEDICINE | Facility: CLINIC | Age: 54
End: 2019-04-10

## 2019-04-10 ENCOUNTER — OFFICE VISIT (OUTPATIENT)
Dept: FAMILY MEDICINE | Facility: CLINIC | Age: 54
End: 2019-04-10
Payer: COMMERCIAL

## 2019-04-10 VITALS
SYSTOLIC BLOOD PRESSURE: 140 MMHG | HEART RATE: 79 BPM | WEIGHT: 315 LBS | BODY MASS INDEX: 44.1 KG/M2 | HEIGHT: 71 IN | DIASTOLIC BLOOD PRESSURE: 80 MMHG | TEMPERATURE: 98 F

## 2019-04-10 DIAGNOSIS — M79.661 PAIN AND SWELLING OF RIGHT LOWER LEG: ICD-10-CM

## 2019-04-10 DIAGNOSIS — M25.471 RIGHT ANKLE SWELLING: Primary | ICD-10-CM

## 2019-04-10 DIAGNOSIS — M79.89 PAIN AND SWELLING OF RIGHT LOWER LEG: ICD-10-CM

## 2019-04-10 DIAGNOSIS — M25.471 RIGHT ANKLE SWELLING: ICD-10-CM

## 2019-04-10 PROCEDURE — 99999 PR PBB SHADOW E&M-EST. PATIENT-LVL III: CPT | Mod: PBBFAC,,, | Performed by: FAMILY MEDICINE

## 2019-04-10 PROCEDURE — 3008F PR BODY MASS INDEX (BMI) DOCUMENTED: ICD-10-PCS | Mod: CPTII,S$GLB,, | Performed by: FAMILY MEDICINE

## 2019-04-10 PROCEDURE — 99214 OFFICE O/P EST MOD 30 MIN: CPT | Mod: S$GLB,,, | Performed by: FAMILY MEDICINE

## 2019-04-10 PROCEDURE — 99999 PR PBB SHADOW E&M-EST. PATIENT-LVL III: ICD-10-PCS | Mod: PBBFAC,,, | Performed by: FAMILY MEDICINE

## 2019-04-10 PROCEDURE — 93971 CV US DOPPLER VENOUS LEG RIGHT (CUPID ONLY): ICD-10-PCS | Mod: RT,S$GLB,, | Performed by: INTERNAL MEDICINE

## 2019-04-10 PROCEDURE — 73610 X-RAY EXAM OF ANKLE: CPT | Mod: 26,RT,, | Performed by: RADIOLOGY

## 2019-04-10 PROCEDURE — 73610 X-RAY EXAM OF ANKLE: CPT | Mod: TC,FY,PO,RT

## 2019-04-10 PROCEDURE — 73610 XR ANKLE COMPLETE 3 VIEW RIGHT: ICD-10-PCS | Mod: 26,RT,, | Performed by: RADIOLOGY

## 2019-04-10 PROCEDURE — 93971 EXTREMITY STUDY: CPT | Mod: RT,S$GLB,, | Performed by: INTERNAL MEDICINE

## 2019-04-10 PROCEDURE — 99214 PR OFFICE/OUTPT VISIT, EST, LEVL IV, 30-39 MIN: ICD-10-PCS | Mod: S$GLB,,, | Performed by: FAMILY MEDICINE

## 2019-04-10 PROCEDURE — 3008F BODY MASS INDEX DOCD: CPT | Mod: CPTII,S$GLB,, | Performed by: FAMILY MEDICINE

## 2019-04-10 NOTE — TELEPHONE ENCOUNTER
Pt informed of venous ultrasound results, elevate leg as much as possible, call in 4-5 days if not better

## 2019-05-01 ENCOUNTER — TELEPHONE (OUTPATIENT)
Dept: FAMILY MEDICINE | Facility: CLINIC | Age: 54
End: 2019-05-01

## 2019-05-01 DIAGNOSIS — Z00.00 ROUTINE GENERAL MEDICAL EXAMINATION AT A HEALTH CARE FACILITY: Primary | ICD-10-CM

## 2019-05-01 DIAGNOSIS — R73.09 ELEVATED GLUCOSE: ICD-10-CM

## 2019-05-01 RX ORDER — TERBINAFINE HYDROCHLORIDE 250 MG/1
TABLET ORAL
COMMUNITY
Start: 2019-04-04 | End: 2020-09-01

## 2019-05-16 ENCOUNTER — OFFICE VISIT (OUTPATIENT)
Dept: PRIMARY CARE CLINIC | Facility: CLINIC | Age: 54
End: 2019-05-16
Payer: COMMERCIAL

## 2019-05-16 VITALS
BODY MASS INDEX: 44.1 KG/M2 | TEMPERATURE: 98 F | RESPIRATION RATE: 20 BRPM | SYSTOLIC BLOOD PRESSURE: 110 MMHG | DIASTOLIC BLOOD PRESSURE: 80 MMHG | WEIGHT: 315 LBS | HEIGHT: 71 IN

## 2019-05-16 DIAGNOSIS — E66.01 MORBID OBESITY WITH BMI OF 40.0-44.9, ADULT: ICD-10-CM

## 2019-05-16 DIAGNOSIS — M19.071 DJD (DEGENERATIVE JOINT DISEASE), ANKLE AND FOOT, RIGHT: ICD-10-CM

## 2019-05-16 DIAGNOSIS — S39.012A LOW BACK STRAIN, INITIAL ENCOUNTER: Primary | ICD-10-CM

## 2019-05-16 PROBLEM — I82.411 ACUTE DEEP VEIN THROMBOSIS (DVT) OF FEMORAL VEIN OF RIGHT LOWER EXTREMITY: Status: RESOLVED | Noted: 2018-04-06 | Resolved: 2019-05-16

## 2019-05-16 PROBLEM — R19.7 DIARRHEA: Status: RESOLVED | Noted: 2019-02-15 | Resolved: 2019-05-16

## 2019-05-16 PROBLEM — M19.072 DJD (DEGENERATIVE JOINT DISEASE), ANKLE AND FOOT, LEFT: Status: ACTIVE | Noted: 2019-05-16

## 2019-05-16 PROCEDURE — 99214 PR OFFICE/OUTPT VISIT, EST, LEVL IV, 30-39 MIN: ICD-10-PCS | Mod: S$GLB,,, | Performed by: FAMILY MEDICINE

## 2019-05-16 PROCEDURE — 3008F BODY MASS INDEX DOCD: CPT | Mod: CPTII,S$GLB,, | Performed by: FAMILY MEDICINE

## 2019-05-16 PROCEDURE — 3008F PR BODY MASS INDEX (BMI) DOCUMENTED: ICD-10-PCS | Mod: CPTII,S$GLB,, | Performed by: FAMILY MEDICINE

## 2019-05-16 PROCEDURE — 99999 PR PBB SHADOW E&M-EST. PATIENT-LVL III: CPT | Mod: PBBFAC,,, | Performed by: FAMILY MEDICINE

## 2019-05-16 PROCEDURE — 99214 OFFICE O/P EST MOD 30 MIN: CPT | Mod: S$GLB,,, | Performed by: FAMILY MEDICINE

## 2019-05-16 PROCEDURE — 99999 PR PBB SHADOW E&M-EST. PATIENT-LVL III: ICD-10-PCS | Mod: PBBFAC,,, | Performed by: FAMILY MEDICINE

## 2019-05-16 RX ORDER — AMOXICILLIN 500 MG/1
TABLET, FILM COATED ORAL
Refills: 0 | COMMUNITY
Start: 2019-05-02 | End: 2019-07-09 | Stop reason: ALTCHOICE

## 2019-05-16 RX ORDER — METHOCARBAMOL 750 MG/1
750 TABLET, FILM COATED ORAL 3 TIMES DAILY
Qty: 30 TABLET | Refills: 0 | Status: SHIPPED | OUTPATIENT
Start: 2019-05-16 | End: 2019-05-26

## 2019-05-16 RX ORDER — ACETAMINOPHEN AND CODEINE PHOSPHATE 300; 30 MG/1; MG/1
TABLET ORAL
Refills: 0 | COMMUNITY
Start: 2019-05-02 | End: 2019-11-27

## 2019-05-16 NOTE — PROGRESS NOTES
Subjective:      Patient ID: Topher Cr is a 53 y.o. male.    Chief Complaint: Spasms (back)     Here today for discomfort in his lower back.  When he stands too long it bothers him, he has been walking to lose weight but after walking 1 week straight he had to stop due to tightening his lower back.  Denies injury, trauma no fall.  He has been taking 2 ibuprofen a day but only for 2-3 days.  He is not taking any medications now.  He has not been stretching.  He did have physical therapy for back discomfort in the past but it has been many years.  Denies any radiation down legs, no bowel bladder incontinence.  He is trying to eat sugar, but having difficulty following the diet.    He finished prescription from his Podiatrist for Tylenol #3 for his ingrowing nail    He saw his primary physician Dr. Mcfadden April 10th for ankle swelling. RIGHT nkle  X-ray showed degenerative changes. US negative for right leg  DVT    Current Outpatient Medications:     acetaminophen-codeine 300-30mg (TYLENOL #3) 300-30 mg Tab, TK 1 T PO  Q FOUR TO SIX H WF, Disp: , Rfl: 0    amoxicillin (AMOXIL) 500 MG Tab, TK 1 T PO BID, Disp: , Rfl: 0    clobetasol (TEMOVATE) 0.05 % external solution, Pt to mix in 1 jar of cerave cream and apply to thighs BID, Disp: 50 mL, Rfl: 3    ketoconazole (NIZORAL) 2 % cream, AAA bid abdomen, Disp: 60 g, Rfl: 3    terbinafine HCl (LAMISIL) 250 mg tablet, , Disp: , Rfl:     methocarbamol (ROBAXIN) 750 MG Tab, Take 1 tablet (750 mg total) by mouth 3 (three) times daily. for 10 days, Disp: 30 tablet, Rfl: 0    Past Medical History:   Diagnosis Date    Allergy     Blood clotting tendency     DJD (degenerative joint disease)     Hyperlipidemia     Low back strain, initial encounter 5/16/2019    Morbid obesity with BMI of 40.0-44.9, adult 3/29/2018    Obesity     Urticaria      Past Surgical History:   Procedure Laterality Date    COLONOSCOPY N/A 2/15/2019    [facility]  "ENDO    COLONOSCOPY N/A 8/6/2015    [facility]el [facility] at [facility] ENDO (4TH F[facility])    ESOPHAGOGASTRODUODENOSCOPY (EGD) N/A 2/15/2019    [facility]O    KNEE SURGERY Right      Social History     Social History Narrative    Works in [employer], nonsmoker     Family History   Problem Relation Age of Onset    Asthma Mother     Heart disease Father     Asthma Brother     Colon cancer Neg Hx     Esophageal cancer Neg Hx     Rectal cancer Neg Hx     Stomach cancer Neg Hx     Ulcerative colitis Neg Hx     Irritable bowel syndrome Neg Hx     Crohn's disease Neg Hx     Celiac disease Neg Hx      Vitals:    05/16/19 0955   BP: 110/80   Resp: 20   Temp: 97.6 °F (36.4 °C)   Weight: (!) 152.2 kg (335 lb 8.6 oz)   Height: 5' 11" (1.803 m)   PainSc:   4     Objective:   Physical Exam   Musculoskeletal:   Large pannus, Normal gait, can walk on toes and heels, can lean forward and touch toes, and lean back and side to side without discomfort,  nontender lumbar spine, tender paraspinous musculature, nontender sacroiliacs, negative straight leg test, 2+ pulses       Assessment:     1. Low back strain, initial encounter    2. Morbid obesity with BMI of 40.0-44.9, adult    3. DJD (degenerative joint disease), ankle and foot, right      Plan:     Orders Placed This Encounter    methocarbamol (ROBAXIN) 750 MG Tab   tid    Patient Instructions   He has physical exam with his primary care physician in July     ===============  Keep stretching several times a day    Focus on stopping eating sugar  ====    Wear  ankle brace to help with walking  ========    You can call the [facility], PHYSICAL THERAPY center for an appointment    [address] [facility] - not far from the [address] [place] [address], right past [facility]  [phone]    They will help diagnose the cause of your problem.     Let me know if your symptoms persist                                "

## 2019-05-16 NOTE — PATIENT INSTRUCTIONS
He has physical exam with his primary care physician in July     ===============  Keep stretching several times a day    Focus on stopping eating sugar  ====    Wear  ankle brace to help with walking  ========    You can call the [facility], PHYSICAL THERAPY center for an appointment    [address] [facility] - not far from the [address] [place] [address], right past [facility]  [phone]    They will help diagnose the cause of your problem.     Let me know if your symptoms persist

## 2019-06-25 ENCOUNTER — PATIENT OUTREACH (OUTPATIENT)
Dept: ADMINISTRATIVE | Facility: HOSPITAL | Age: 54
End: 2019-06-25

## 2019-07-09 ENCOUNTER — OFFICE VISIT (OUTPATIENT)
Dept: PRIMARY CARE CLINIC | Facility: CLINIC | Age: 54
End: 2019-07-09
Payer: COMMERCIAL

## 2019-07-09 VITALS
TEMPERATURE: 99 F | WEIGHT: 315 LBS | SYSTOLIC BLOOD PRESSURE: 100 MMHG | DIASTOLIC BLOOD PRESSURE: 80 MMHG | HEART RATE: 68 BPM | HEIGHT: 71 IN | BODY MASS INDEX: 44.1 KG/M2

## 2019-07-09 DIAGNOSIS — Z00.00 ROUTINE GENERAL MEDICAL EXAMINATION AT A HEALTH CARE FACILITY: Primary | ICD-10-CM

## 2019-07-09 PROCEDURE — 99396 PR PREVENTIVE VISIT,EST,40-64: ICD-10-PCS | Mod: S$GLB,,, | Performed by: FAMILY MEDICINE

## 2019-07-09 PROCEDURE — 99396 PREV VISIT EST AGE 40-64: CPT | Mod: S$GLB,,, | Performed by: FAMILY MEDICINE

## 2019-07-09 PROCEDURE — 99999 PR PBB SHADOW E&M-EST. PATIENT-LVL III: CPT | Mod: PBBFAC,,, | Performed by: FAMILY MEDICINE

## 2019-07-09 PROCEDURE — 99999 PR PBB SHADOW E&M-EST. PATIENT-LVL III: ICD-10-PCS | Mod: PBBFAC,,, | Performed by: FAMILY MEDICINE

## 2019-07-09 NOTE — PROGRESS NOTES
Subjective:       Patient ID: Topher Cr is a 53 y.o. male.    Chief Complaint: Annual Exam    54 yo presents for routine exam, last exam one year ago  Colonoscopy:  2019    Review of Systems   Constitutional: Negative for activity change, appetite change, chills, fatigue, fever and unexpected weight change.   HENT: Negative for congestion, ear discharge, ear pain, hearing loss and sinus pressure.    Eyes: Negative for pain and visual disturbance.   Respiratory: Negative for cough, chest tightness and shortness of breath.    Cardiovascular: Negative for chest pain, palpitations and leg swelling.   Gastrointestinal: Negative for abdominal distention and abdominal pain.   Genitourinary: Negative for difficulty urinating, dysuria, frequency and hematuria.   Musculoskeletal: Negative for arthralgias, back pain, gait problem, joint swelling, myalgias, neck pain and neck stiffness.   Skin: Negative for rash.   Neurological: Negative for dizziness, tremors, speech difficulty, weakness, numbness and headaches.   Psychiatric/Behavioral: Negative for agitation and behavioral problems.       Objective:      Physical Exam   Constitutional: He is oriented to person, place, and time. He appears well-developed and well-nourished.   HENT:   Head: Normocephalic.   Right Ear: Tympanic membrane, external ear and ear canal normal.   Left Ear: Tympanic membrane, external ear and ear canal normal.   Nose: Nose normal.   Mouth/Throat: Uvula is midline, oropharynx is clear and moist and mucous membranes are normal. No posterior oropharyngeal erythema.   Eyes: Pupils are equal, round, and reactive to light. Conjunctivae and EOM are normal.   Neck: Normal range of motion. Neck supple. No JVD present. Carotid bruit is not present. No thyroid mass and no thyromegaly present.   Cardiovascular: Normal rate, regular rhythm and normal heart sounds.   No murmur heard.  Pulmonary/Chest: Effort normal and breath sounds normal. He has no rales.    Abdominal: Soft. Bowel sounds are normal. He exhibits no mass. There is no tenderness. Hernia confirmed negative in the right inguinal area and confirmed negative in the left inguinal area.   Genitourinary: Prostate normal, testes normal and penis normal. Right testis shows no mass and no tenderness. Left testis shows no mass and no tenderness.   Musculoskeletal: Normal range of motion. He exhibits no edema.   Lymphadenopathy:     He has no cervical adenopathy. No inguinal adenopathy noted on the right or left side.   Neurological: He is alert and oriented to person, place, and time. He has normal reflexes. No cranial nerve deficit.   Skin: Skin is warm. No lesion and no rash noted.   3 darkly pigmented nevi, 2 over left chest/abdomen, one over left lower leg, measure about 6mm.  Leg lesion irregular   Psychiatric: He has a normal mood and affect.       Assessment:       1.  Physical exam  2.  Obesity  3.  nevus  Plan:       1.  Return for fasting labs  2.  Return for shave biopsy of skin lesion

## 2019-07-18 ENCOUNTER — LAB VISIT (OUTPATIENT)
Dept: LAB | Facility: HOSPITAL | Age: 54
End: 2019-07-18
Attending: FAMILY MEDICINE
Payer: COMMERCIAL

## 2019-07-18 DIAGNOSIS — R73.09 ELEVATED GLUCOSE: ICD-10-CM

## 2019-07-18 DIAGNOSIS — Z00.00 ROUTINE GENERAL MEDICAL EXAMINATION AT A HEALTH CARE FACILITY: ICD-10-CM

## 2019-07-18 LAB
ALBUMIN SERPL BCP-MCNC: 3.7 G/DL (ref 3.5–5.2)
ALP SERPL-CCNC: 44 U/L (ref 55–135)
ALT SERPL W/O P-5'-P-CCNC: 50 U/L (ref 10–44)
ANION GAP SERPL CALC-SCNC: 7 MMOL/L (ref 8–16)
AST SERPL-CCNC: 47 U/L (ref 10–40)
BASOPHILS # BLD AUTO: 0.07 K/UL (ref 0–0.2)
BASOPHILS NFR BLD: 1.4 % (ref 0–1.9)
BILIRUB SERPL-MCNC: 0.4 MG/DL (ref 0.1–1)
BUN SERPL-MCNC: 14 MG/DL (ref 6–20)
CALCIUM SERPL-MCNC: 9.4 MG/DL (ref 8.7–10.5)
CHLORIDE SERPL-SCNC: 104 MMOL/L (ref 95–110)
CHOLEST SERPL-MCNC: 175 MG/DL (ref 120–199)
CHOLEST/HDLC SERPL: 4.9 {RATIO} (ref 2–5)
CO2 SERPL-SCNC: 27 MMOL/L (ref 23–29)
COMPLEXED PSA SERPL-MCNC: 0.33 NG/ML (ref 0–4)
CREAT SERPL-MCNC: 0.9 MG/DL (ref 0.5–1.4)
DIFFERENTIAL METHOD: ABNORMAL
EOSINOPHIL # BLD AUTO: 0.2 K/UL (ref 0–0.5)
EOSINOPHIL NFR BLD: 3.3 % (ref 0–8)
ERYTHROCYTE [DISTWIDTH] IN BLOOD BY AUTOMATED COUNT: 14.6 % (ref 11.5–14.5)
EST. GFR  (AFRICAN AMERICAN): >60 ML/MIN/1.73 M^2
EST. GFR  (NON AFRICAN AMERICAN): >60 ML/MIN/1.73 M^2
ESTIMATED AVG GLUCOSE: 128 MG/DL (ref 68–131)
GLUCOSE SERPL-MCNC: 96 MG/DL (ref 70–110)
HBA1C MFR BLD HPLC: 6.1 % (ref 4–5.6)
HCT VFR BLD AUTO: 41.9 % (ref 40–54)
HDLC SERPL-MCNC: 36 MG/DL (ref 40–75)
HDLC SERPL: 20.6 % (ref 20–50)
HGB BLD-MCNC: 12.7 G/DL (ref 14–18)
IMM GRANULOCYTES # BLD AUTO: 0.04 K/UL (ref 0–0.04)
IMM GRANULOCYTES NFR BLD AUTO: 0.8 % (ref 0–0.5)
LDLC SERPL CALC-MCNC: 120.4 MG/DL (ref 63–159)
LYMPHOCYTES # BLD AUTO: 2.5 K/UL (ref 1–4.8)
LYMPHOCYTES NFR BLD: 48.8 % (ref 18–48)
MCH RBC QN AUTO: 23 PG (ref 27–31)
MCHC RBC AUTO-ENTMCNC: 30.3 G/DL (ref 32–36)
MCV RBC AUTO: 76 FL (ref 82–98)
MONOCYTES # BLD AUTO: 0.6 K/UL (ref 0.3–1)
MONOCYTES NFR BLD: 10.7 % (ref 4–15)
NEUTROPHILS # BLD AUTO: 1.8 K/UL (ref 1.8–7.7)
NEUTROPHILS NFR BLD: 35 % (ref 38–73)
NONHDLC SERPL-MCNC: 139 MG/DL
NRBC BLD-RTO: 0 /100 WBC
PLATELET # BLD AUTO: 345 K/UL (ref 150–350)
PMV BLD AUTO: 10.1 FL (ref 9.2–12.9)
POTASSIUM SERPL-SCNC: 4.1 MMOL/L (ref 3.5–5.1)
PROT SERPL-MCNC: 7.1 G/DL (ref 6–8.4)
RBC # BLD AUTO: 5.51 M/UL (ref 4.6–6.2)
SODIUM SERPL-SCNC: 138 MMOL/L (ref 136–145)
TRIGL SERPL-MCNC: 93 MG/DL (ref 30–150)
TSH SERPL DL<=0.005 MIU/L-ACNC: 1.21 UIU/ML (ref 0.4–4)
WBC # BLD AUTO: 5.16 K/UL (ref 3.9–12.7)

## 2019-07-18 PROCEDURE — 83036 HEMOGLOBIN GLYCOSYLATED A1C: CPT

## 2019-07-18 PROCEDURE — 36415 COLL VENOUS BLD VENIPUNCTURE: CPT | Mod: PN

## 2019-07-18 PROCEDURE — 84443 ASSAY THYROID STIM HORMONE: CPT

## 2019-07-18 PROCEDURE — 80053 COMPREHEN METABOLIC PANEL: CPT

## 2019-07-18 PROCEDURE — 85025 COMPLETE CBC W/AUTO DIFF WBC: CPT

## 2019-07-18 PROCEDURE — 80061 LIPID PANEL: CPT

## 2019-07-18 PROCEDURE — 84153 ASSAY OF PSA TOTAL: CPT

## 2019-07-22 ENCOUNTER — OFFICE VISIT (OUTPATIENT)
Dept: PRIMARY CARE CLINIC | Facility: CLINIC | Age: 54
End: 2019-07-22
Payer: COMMERCIAL

## 2019-07-22 VITALS
SYSTOLIC BLOOD PRESSURE: 112 MMHG | TEMPERATURE: 98 F | WEIGHT: 315 LBS | BODY MASS INDEX: 44.1 KG/M2 | DIASTOLIC BLOOD PRESSURE: 80 MMHG | RESPIRATION RATE: 20 BRPM | HEIGHT: 71 IN

## 2019-07-22 DIAGNOSIS — D22.72 NEVUS OF LEFT LOWER LEG: Primary | ICD-10-CM

## 2019-07-22 PROCEDURE — 99213 OFFICE O/P EST LOW 20 MIN: CPT | Mod: 25,S$GLB,, | Performed by: FAMILY MEDICINE

## 2019-07-22 PROCEDURE — 88305 TISSUE EXAM BY PATHOLOGIST: CPT | Mod: 26,,, | Performed by: PATHOLOGY

## 2019-07-22 PROCEDURE — 11301 SHAVE SKIN LESION 0.6-1.0 CM: CPT | Mod: S$GLB,,, | Performed by: FAMILY MEDICINE

## 2019-07-22 PROCEDURE — 88305 TISSUE SPECIMEN TO PATHOLOGY, INTERNAL MEDICINE: ICD-10-PCS | Mod: 26,,, | Performed by: PATHOLOGY

## 2019-07-22 PROCEDURE — 99999 PR PBB SHADOW E&M-EST. PATIENT-LVL III: ICD-10-PCS | Mod: PBBFAC,,, | Performed by: FAMILY MEDICINE

## 2019-07-22 PROCEDURE — 99213 PR OFFICE/OUTPT VISIT, EST, LEVL III, 20-29 MIN: ICD-10-PCS | Mod: 25,S$GLB,, | Performed by: FAMILY MEDICINE

## 2019-07-22 PROCEDURE — 99999 PR PBB SHADOW E&M-EST. PATIENT-LVL III: CPT | Mod: PBBFAC,,, | Performed by: FAMILY MEDICINE

## 2019-07-22 PROCEDURE — 3008F PR BODY MASS INDEX (BMI) DOCUMENTED: ICD-10-PCS | Mod: CPTII,S$GLB,, | Performed by: FAMILY MEDICINE

## 2019-07-22 PROCEDURE — 3008F BODY MASS INDEX DOCD: CPT | Mod: CPTII,S$GLB,, | Performed by: FAMILY MEDICINE

## 2019-07-22 PROCEDURE — 88305 TISSUE EXAM BY PATHOLOGIST: CPT | Performed by: PATHOLOGY

## 2019-07-22 PROCEDURE — 11301 PR SHAV SKIN LES 0.6-1.0 CM TRUNK,ARM,LEG: ICD-10-PCS | Mod: S$GLB,,, | Performed by: FAMILY MEDICINE

## 2019-07-22 NOTE — PROGRESS NOTES
Subjective:       Patient ID: Topher Cr is a 53 y.o. male.    Chief Complaint: Mole (left leg)    54 yo presents today for excisional biopsy of hyperpigmented nevus, left lower leg, found during routine exam, no other complaints    Review of Systems   Skin: Positive for color change.       Objective:      Physical Exam   Skin:        4-6mm hyperpigmented slightly raised lesion left pretibial area with slightly irregular margins       Assessment:       1. Nevus of left lower leg        Plan:       After informed verbal consent, using 2% Xylocaine and sterile technique, shave biopsy done of the lesion.  Specimen sent to pathology, sterile dressing applied. Pt tolerated the procedure with no problems or side effects.   Call if any signs of infection (swelling, redness) occur

## 2019-07-30 ENCOUNTER — TELEPHONE (OUTPATIENT)
Dept: PRIMARY CARE CLINIC | Facility: CLINIC | Age: 54
End: 2019-07-30

## 2019-11-27 ENCOUNTER — LAB VISIT (OUTPATIENT)
Dept: LAB | Facility: HOSPITAL | Age: 54
End: 2019-11-27
Attending: FAMILY MEDICINE
Payer: COMMERCIAL

## 2019-11-27 ENCOUNTER — OFFICE VISIT (OUTPATIENT)
Dept: PRIMARY CARE CLINIC | Facility: CLINIC | Age: 54
End: 2019-11-27
Payer: COMMERCIAL

## 2019-11-27 VITALS
HEART RATE: 73 BPM | SYSTOLIC BLOOD PRESSURE: 124 MMHG | BODY MASS INDEX: 44.1 KG/M2 | DIASTOLIC BLOOD PRESSURE: 74 MMHG | OXYGEN SATURATION: 98 % | HEIGHT: 71 IN | TEMPERATURE: 98 F | WEIGHT: 315 LBS

## 2019-11-27 DIAGNOSIS — N52.9 ERECTILE DYSFUNCTION, UNSPECIFIED ERECTILE DYSFUNCTION TYPE: ICD-10-CM

## 2019-11-27 DIAGNOSIS — E66.01 MORBID OBESITY WITH BMI OF 40.0-44.9, ADULT: ICD-10-CM

## 2019-11-27 DIAGNOSIS — F52.0 LACK OF SEXUAL DESIRE: ICD-10-CM

## 2019-11-27 DIAGNOSIS — M19.071 DJD (DEGENERATIVE JOINT DISEASE), ANKLE AND FOOT, RIGHT: ICD-10-CM

## 2019-11-27 DIAGNOSIS — K42.9 UMBILICAL HERNIA WITHOUT OBSTRUCTION AND WITHOUT GANGRENE: Primary | ICD-10-CM

## 2019-11-27 LAB — TESTOST SERPL-MCNC: 138 NG/DL (ref 304–1227)

## 2019-11-27 PROCEDURE — 36415 COLL VENOUS BLD VENIPUNCTURE: CPT | Mod: PN

## 2019-11-27 PROCEDURE — 99999 PR PBB SHADOW E&M-EST. PATIENT-LVL III: ICD-10-PCS | Mod: PBBFAC,,, | Performed by: FAMILY MEDICINE

## 2019-11-27 PROCEDURE — 84403 ASSAY OF TOTAL TESTOSTERONE: CPT

## 2019-11-27 PROCEDURE — 3008F PR BODY MASS INDEX (BMI) DOCUMENTED: ICD-10-PCS | Mod: CPTII,S$GLB,, | Performed by: FAMILY MEDICINE

## 2019-11-27 PROCEDURE — 3008F BODY MASS INDEX DOCD: CPT | Mod: CPTII,S$GLB,, | Performed by: FAMILY MEDICINE

## 2019-11-27 PROCEDURE — 99214 OFFICE O/P EST MOD 30 MIN: CPT | Mod: S$GLB,,, | Performed by: FAMILY MEDICINE

## 2019-11-27 PROCEDURE — 99214 PR OFFICE/OUTPT VISIT, EST, LEVL IV, 30-39 MIN: ICD-10-PCS | Mod: S$GLB,,, | Performed by: FAMILY MEDICINE

## 2019-11-27 PROCEDURE — 99999 PR PBB SHADOW E&M-EST. PATIENT-LVL III: CPT | Mod: PBBFAC,,, | Performed by: FAMILY MEDICINE

## 2019-11-27 PROCEDURE — 84402 ASSAY OF FREE TESTOSTERONE: CPT

## 2019-11-27 RX ORDER — SILDENAFIL 100 MG/1
100 TABLET, FILM COATED ORAL DAILY PRN
Qty: 30 TABLET | Refills: 11 | Status: SHIPPED | OUTPATIENT
Start: 2019-11-27 | End: 2020-08-03

## 2019-11-29 PROBLEM — K42.9 UMBILICAL HERNIA WITHOUT OBSTRUCTION AND WITHOUT GANGRENE: Status: ACTIVE | Noted: 2019-11-29

## 2019-11-29 PROBLEM — B35.1 TOENAIL FUNGUS: Status: ACTIVE | Noted: 2019-11-29

## 2019-11-29 PROBLEM — F52.0 LACK OF SEXUAL DESIRE: Status: ACTIVE | Noted: 2019-11-29

## 2019-11-29 PROBLEM — N52.9 ERECTILE DYSFUNCTION: Status: ACTIVE | Noted: 2019-11-29

## 2019-11-29 PROBLEM — B35.1 TOENAIL FUNGUS: Status: RESOLVED | Noted: 2019-11-29 | Resolved: 2019-11-29

## 2019-11-29 PROBLEM — S39.012A LOW BACK STRAIN, INITIAL ENCOUNTER: Status: RESOLVED | Noted: 2019-05-16 | Resolved: 2019-11-29

## 2019-11-29 NOTE — PROGRESS NOTES
Subjective:   Patient ID: Topher Cr is a 54 y.o. male.    Chief Complaint: Cyst (inside stomach)      HPI  53 yo male here for abdominal concerns. Fears he may had an abd hernia. No pain. No skin lesion. No umb or other discomfort.    Also having severely decreased sex drive and erectile dysfx    Patient queried and denies any further complaints.        ALLERGIES AND MEDICATIONS: updated and reviewed.  Review of patient's allergies indicates:   Allergen Reactions    Cucumber fruit extract Hives and Itching       Current Outpatient Medications:     sildenafil (VIAGRA) 100 MG tablet, Take 1 tablet (100 mg total) by mouth daily as needed., Disp: 30 tablet, Rfl: 11    terbinafine HCl (LAMISIL) 250 mg tablet, , Disp: , Rfl:     Review of Systems   Constitutional: Positive for fatigue. Negative for activity change, appetite change, chills, diaphoresis, fever and unexpected weight change.   HENT: Negative for congestion, ear discharge, ear pain, facial swelling, hearing loss, nosebleeds, postnasal drip, rhinorrhea, sinus pressure, sneezing, sore throat, tinnitus, trouble swallowing and voice change.    Eyes: Negative for photophobia, pain, discharge, redness, itching and visual disturbance.   Respiratory: Negative for cough, chest tightness, shortness of breath and wheezing.    Cardiovascular: Negative for chest pain, palpitations and leg swelling.   Gastrointestinal: Negative for abdominal distention, abdominal pain, anal bleeding, blood in stool, constipation, diarrhea, nausea, rectal pain and vomiting.   Endocrine: Negative for cold intolerance, heat intolerance, polydipsia, polyphagia and polyuria.   Genitourinary: Negative for decreased urine volume, difficulty urinating, discharge, dysuria, enuresis, flank pain, frequency, genital sores, hematuria, penile pain, penile swelling, scrotal swelling, testicular pain and urgency.   Musculoskeletal: Negative for arthralgias, back pain, joint swelling, myalgias and  "neck pain.   Skin: Negative for rash.   Neurological: Negative for dizziness, tremors, seizures, syncope, speech difficulty, weakness, light-headedness, numbness and headaches.   Psychiatric/Behavioral: Negative for behavioral problems, confusion, decreased concentration, dysphoric mood, sleep disturbance and suicidal ideas. The patient is not nervous/anxious and is not hyperactive.        Objective:     Vitals:    11/27/19 1417 11/27/19 1446   BP: 138/64 124/74   Pulse: 73    Temp: 97.8 °F (36.6 °C)    TempSrc: Oral    SpO2: 98%    Weight: (!) 150.6 kg (332 lb 0.2 oz)    Height: 5' 10.5" (1.791 m)    PainSc: 0-No pain      Body mass index is 46.97 kg/m².    Physical Exam   Constitutional: He appears well-developed and well-nourished.   HENT:   Head: Normocephalic and atraumatic.   Right Ear: Hearing, tympanic membrane, external ear and ear canal normal. No drainage or swelling. No decreased hearing is noted.   Left Ear: Hearing, tympanic membrane, external ear and ear canal normal. No drainage or swelling. No decreased hearing is noted.   Nose: Nose normal. No rhinorrhea.   Mouth/Throat: Oropharynx is clear and moist. No oropharyngeal exudate, posterior oropharyngeal edema or posterior oropharyngeal erythema.   Eyes: Pupils are equal, round, and reactive to light. Conjunctivae, EOM and lids are normal. Right eye exhibits no discharge and no exudate. Left eye exhibits no discharge and no exudate. Right conjunctiva is not injected. Left conjunctiva is not injected.   Neck: Trachea normal and full passive range of motion without pain. Normal carotid pulses, no hepatojugular reflux and no JVD present. Carotid bruit is not present. No neck rigidity. No edema and no erythema present. No thyroid mass and no thyromegaly present.   Cardiovascular: Normal rate, regular rhythm and normal heart sounds.   Pulmonary/Chest: Effort normal. No respiratory distress.   Abdominal: Soft. Normal appearance and bowel sounds are normal. " He exhibits no shifting dullness, no distension, no pulsatile liver, no fluid wave, no abdominal bruit, no ascites, no pulsatile midline mass and no mass. There is no hepatosplenomegaly. There is no tenderness. There is no rigidity, no rebound, no CVA tenderness, no tenderness at McBurney's point and negative Elena's sign. No hernia. Hernia confirmed negative in the ventral area, confirmed negative in the right inguinal area and confirmed negative in the left inguinal area.   Lymphadenopathy:     He has no cervical adenopathy.   Neurological: He is alert.   Skin: Skin is warm and dry.   Psychiatric: He has a normal mood and affect. His speech is normal and behavior is normal.   Nursing note and vitals reviewed.      Assessment and Plan:   Topher was seen today for cyst.    Diagnoses and all orders for this visit:    Umbilical hernia without obstruction and without gangrene    Lack of sexual desire  -     Testosterone; Future  -     Testosterone, free; Future    Erectile dysfunction, unspecified erectile dysfunction type  -     Testosterone; Future  -     Testosterone, free; Future    Morbid obesity with BMI of 40.0-44.9, adult    DJD (degenerative joint disease), ankle and foot, right    Other orders  -     sildenafil (VIAGRA) 100 MG tablet; Take 1 tablet (100 mg total) by mouth daily as needed.      Time spent in the evaluation and management of this patient exceeded 45min and greater than 50% of this time was in face-to-face education regarding diagnoses, medications, plan, and follow-up.    No follow-ups on file.    THIS NOTE WILL BE SHARED WITH THE PATIENT.

## 2019-12-02 ENCOUNTER — TELEPHONE (OUTPATIENT)
Dept: ADMINISTRATIVE | Facility: OTHER | Age: 54
End: 2019-12-02

## 2019-12-02 DIAGNOSIS — R79.89 LOW TESTOSTERONE: Primary | ICD-10-CM

## 2019-12-02 LAB — TESTOST FREE SERPL-MCNC: 1.5 PG/ML

## 2019-12-02 NOTE — TELEPHONE ENCOUNTER
Left voice message for patient to return call to schedule appointment from referral to Urology department.  Sherri -[phone]

## 2019-12-06 ENCOUNTER — OFFICE VISIT (OUTPATIENT)
Dept: UROLOGY | Facility: CLINIC | Age: 54
End: 2019-12-06
Payer: COMMERCIAL

## 2019-12-06 VITALS
BODY MASS INDEX: 44.1 KG/M2 | HEART RATE: 63 BPM | HEIGHT: 71 IN | WEIGHT: 315 LBS | DIASTOLIC BLOOD PRESSURE: 76 MMHG | SYSTOLIC BLOOD PRESSURE: 122 MMHG

## 2019-12-06 DIAGNOSIS — N52.9 ERECTILE DYSFUNCTION, UNSPECIFIED ERECTILE DYSFUNCTION TYPE: ICD-10-CM

## 2019-12-06 DIAGNOSIS — R68.82 DECREASED LIBIDO: Primary | ICD-10-CM

## 2019-12-06 DIAGNOSIS — E66.01 MORBID OBESITY WITH BMI OF 40.0-44.9, ADULT: ICD-10-CM

## 2019-12-06 PROCEDURE — 3008F BODY MASS INDEX DOCD: CPT | Mod: CPTII,S$GLB,, | Performed by: UROLOGY

## 2019-12-06 PROCEDURE — 99204 OFFICE O/P NEW MOD 45 MIN: CPT | Mod: S$GLB,,, | Performed by: UROLOGY

## 2019-12-06 PROCEDURE — 99999 PR PBB SHADOW E&M-EST. PATIENT-LVL III: CPT | Mod: PBBFAC,,,

## 2019-12-06 PROCEDURE — 3008F PR BODY MASS INDEX (BMI) DOCUMENTED: ICD-10-PCS | Mod: CPTII,S$GLB,, | Performed by: UROLOGY

## 2019-12-06 PROCEDURE — 99204 PR OFFICE/OUTPT VISIT, NEW, LEVL IV, 45-59 MIN: ICD-10-PCS | Mod: S$GLB,,, | Performed by: UROLOGY

## 2019-12-06 PROCEDURE — 99999 PR PBB SHADOW E&M-EST. PATIENT-LVL III: ICD-10-PCS | Mod: PBBFAC,,,

## 2019-12-06 NOTE — PROGRESS NOTES
Urology - [facility]  Resident Clinic  Staff - Solo Benitez    SUBJECTIVE:     Chief Complaint: decreased libido    History of Present Illness:  Topher Cr is a 54 y.o. male who presents to clinic for decreased libido. He is New to our clinic. Referral from Solo Chacon MD     He has been experiencing ED and decreased libido x 1 year. He was prescribed Viagra 100 mg by his PCP, Dr. Chacon; he has not tried this yet. He had a serum testosterone drawn at 3 PM - it was 135. Free testosterone was 1.5. He had never had testosterone drawn before this. He denies fatigue or malaise. He does not drink alcohol or use tobacco or marijuana. No recreational drug use. He reports snoring, does not engage in much physical activity. BMI is 46.    Review of patient's allergies indicates:   Allergen Reactions    Cucumber fruit extract Hives and Itching       Past Medical History:   Diagnosis Date    Allergy     Blood clotting tendency     DJD (degenerative joint disease)     Hyperlipidemia     Low back strain, initial encounter 5/16/2019    Morbid obesity with BMI of 40.0-44.9, adult 3/29/2018    Obesity     Urticaria      Past Surgical History:   Procedure Laterality Date    COLONOSCOPY N/A 2/15/2019    Procedure: COLONOSCOPY;  Surgeon: Boris Carey MD;  Location: [facility];  Service: Endoscopy;  Laterality: N/A;    ESOPHAGOGASTRODUODENOSCOPY N/A 2/15/2019    Procedure: ESOPHAGOGASTRODUODENOSCOPY (EGD);  Surgeon: Boris Carey MD;  Location: [facility];  Service: Endoscopy;  Laterality: N/A;    KNEE SURGERY Right      Family History   Problem Relation Age of Onset    Asthma Mother     Heart disease Father     Asthma Brother     Colon cancer Neg Hx     Esophageal cancer Neg Hx     Rectal cancer Neg Hx     Stomach cancer Neg Hx     Ulcerative colitis Neg Hx     Irritable bowel syndrome Neg Hx     Crohn's disease Neg Hx     Celiac disease Neg Hx      Social History     Tobacco Use     "Smoking status: Never Smoker    Smokeless tobacco: Never Used   Substance Use Topics    Alcohol use: No     Alcohol/week: 0.0 standard drinks    Drug use: No        Review of Systems   Constitutional: Negative for chills, fever and unexpected weight change.   HENT: Negative for trouble swallowing.    Eyes: Negative for visual disturbance.   Respiratory: Negative for shortness of breath.    Cardiovascular: Negative for chest pain.   Gastrointestinal: Negative for nausea and vomiting.   Genitourinary: Negative for decreased urine volume, difficulty urinating, flank pain and hematuria.   Musculoskeletal: Positive for arthralgias.   Neurological: Negative for dizziness and light-headedness.   Hematological: Does not bruise/bleed easily.   Psychiatric/Behavioral: Negative for confusion.       OBJECTIVE:     Anticoagulation:  No    Estimated body mass index is 46.97 kg/m² as calculated from the following:    Height as of this encounter: 5' 10.5" (1.791 m).    Weight as of this encounter: 150.6 kg (332 lb 0.2 oz).    Vital Signs (Most Recent)  Pulse: 63 (12/06/19 1252)  BP: 122/76 (12/06/19 1252)    Physical Exam   Constitutional: He is oriented to person, place, and time. He appears well-developed and well-nourished.   obese   HENT:   Head: Normocephalic and atraumatic.   Eyes: Conjunctivae are normal.   Neck: Normal range of motion.   Cardiovascular: Normal rate.    Pulmonary/Chest: Effort normal. No respiratory distress.   Abdominal: Soft. There is no tenderness.   Obese, protruberant abdomen   Genitourinary:   Genitourinary Comments: Uncircumcised penis without phimosis; orthotopic meatus; testes descended bilaterally without masses; bilateral epididymides and spermatic cords unremarkable   Musculoskeletal: Normal range of motion.   Neurological: He is alert and oriented to person, place, and time.   Skin: Skin is warm and dry.     Psychiatric: He has a normal mood and affect. His behavior is normal. Judgment and " thought content normal.       BMP  Lab Results   Component Value Date     07/18/2019    K 4.1 07/18/2019     07/18/2019    CO2 27 07/18/2019    BUN 14 07/18/2019    CREATININE 0.9 07/18/2019    CALCIUM 9.4 07/18/2019    ANIONGAP 7 (L) 07/18/2019    ESTGFRAFRICA >60.0 07/18/2019    EGFRNONAA >60.0 07/18/2019       Lab Results   Component Value Date    WBC 5.16 07/18/2019    HGB 12.7 (L) 07/18/2019    HCT 41.9 07/18/2019    MCV 76 (L) 07/18/2019     07/18/2019       Lab Results   Component Value Date    PSA 0.33 07/18/2019    PSA 0.25 07/12/2016    PSA 0.33 06/23/2015    PSA 0.37 09/14/2011    PSA 0.40 01/06/2010    PSA 0.36 09/30/2008       Imaging:    n/a    ASSESSMENT     1. Decreased libido    2. Erectile dysfunction, unspecified erectile dysfunction type    3. Morbid obesity with BMI of 40.0-44.9, adult        PLAN:     1. Will repeat testosterone - needs to be drawn before 10 AM to be valid. Will also check CBC, CMP, lipids in anticipation of potentially starting testosterone therapy  2. Recommend weight loss and exercise. Encourage good sleep hygiene. Recommend patient be evaluated for sleep apnea. All these factors may contribute to ED, decreased libido, and possible hypogonadism  3. Discussed correct use of Viagra. Patient should take Viagra on an empty stomach 1 hour before attempted intercourse.    Will Proctor MD     Letter to Solo Chacon MD

## 2019-12-10 ENCOUNTER — TELEPHONE (OUTPATIENT)
Dept: PRIMARY CARE CLINIC | Facility: CLINIC | Age: 54
End: 2019-12-10

## 2019-12-12 ENCOUNTER — LAB VISIT (OUTPATIENT)
Dept: LAB | Facility: HOSPITAL | Age: 54
End: 2019-12-12
Payer: COMMERCIAL

## 2019-12-12 DIAGNOSIS — R68.82 DECREASED LIBIDO: ICD-10-CM

## 2019-12-12 LAB
ALBUMIN SERPL BCP-MCNC: 3.7 G/DL (ref 3.5–5.2)
ALP SERPL-CCNC: 49 U/L (ref 55–135)
ALT SERPL W/O P-5'-P-CCNC: 46 U/L (ref 10–44)
ANION GAP SERPL CALC-SCNC: 5 MMOL/L (ref 8–16)
AST SERPL-CCNC: 45 U/L (ref 10–40)
BASOPHILS # BLD AUTO: 0.06 K/UL (ref 0–0.2)
BASOPHILS NFR BLD: 1.4 % (ref 0–1.9)
BILIRUB SERPL-MCNC: 0.3 MG/DL (ref 0.1–1)
BUN SERPL-MCNC: 10 MG/DL (ref 6–20)
CALCIUM SERPL-MCNC: 8.9 MG/DL (ref 8.7–10.5)
CHLORIDE SERPL-SCNC: 107 MMOL/L (ref 95–110)
CHOLEST SERPL-MCNC: 161 MG/DL (ref 120–199)
CHOLEST/HDLC SERPL: 4.6 {RATIO} (ref 2–5)
CO2 SERPL-SCNC: 28 MMOL/L (ref 23–29)
CREAT SERPL-MCNC: 0.9 MG/DL (ref 0.5–1.4)
DIFFERENTIAL METHOD: ABNORMAL
EOSINOPHIL # BLD AUTO: 0.1 K/UL (ref 0–0.5)
EOSINOPHIL NFR BLD: 2.3 % (ref 0–8)
ERYTHROCYTE [DISTWIDTH] IN BLOOD BY AUTOMATED COUNT: 14.9 % (ref 11.5–14.5)
EST. GFR  (AFRICAN AMERICAN): >60 ML/MIN/1.73 M^2
EST. GFR  (NON AFRICAN AMERICAN): >60 ML/MIN/1.73 M^2
GLUCOSE SERPL-MCNC: 103 MG/DL (ref 70–110)
HCT VFR BLD AUTO: 40.3 % (ref 40–54)
HDLC SERPL-MCNC: 35 MG/DL (ref 40–75)
HDLC SERPL: 21.7 % (ref 20–50)
HGB BLD-MCNC: 12.1 G/DL (ref 14–18)
IMM GRANULOCYTES # BLD AUTO: 0.02 K/UL (ref 0–0.04)
IMM GRANULOCYTES NFR BLD AUTO: 0.5 % (ref 0–0.5)
LDLC SERPL CALC-MCNC: 105.8 MG/DL (ref 63–159)
LYMPHOCYTES # BLD AUTO: 2 K/UL (ref 1–4.8)
LYMPHOCYTES NFR BLD: 45.9 % (ref 18–48)
MCH RBC QN AUTO: 23 PG (ref 27–31)
MCHC RBC AUTO-ENTMCNC: 30 G/DL (ref 32–36)
MCV RBC AUTO: 77 FL (ref 82–98)
MONOCYTES # BLD AUTO: 0.5 K/UL (ref 0.3–1)
MONOCYTES NFR BLD: 11 % (ref 4–15)
NEUTROPHILS # BLD AUTO: 1.7 K/UL (ref 1.8–7.7)
NEUTROPHILS NFR BLD: 38.9 % (ref 38–73)
NONHDLC SERPL-MCNC: 126 MG/DL
NRBC BLD-RTO: 0 /100 WBC
PLATELET # BLD AUTO: 337 K/UL (ref 150–350)
PMV BLD AUTO: 9.8 FL (ref 9.2–12.9)
POTASSIUM SERPL-SCNC: 4.2 MMOL/L (ref 3.5–5.1)
PROT SERPL-MCNC: 7.1 G/DL (ref 6–8.4)
RBC # BLD AUTO: 5.25 M/UL (ref 4.6–6.2)
SODIUM SERPL-SCNC: 140 MMOL/L (ref 136–145)
TRIGL SERPL-MCNC: 101 MG/DL (ref 30–150)
WBC # BLD AUTO: 4.44 K/UL (ref 3.9–12.7)

## 2019-12-12 PROCEDURE — 36415 COLL VENOUS BLD VENIPUNCTURE: CPT

## 2019-12-12 PROCEDURE — 80061 LIPID PANEL: CPT

## 2019-12-12 PROCEDURE — 85025 COMPLETE CBC W/AUTO DIFF WBC: CPT

## 2019-12-12 PROCEDURE — 84270 ASSAY OF SEX HORMONE GLOBUL: CPT

## 2019-12-12 PROCEDURE — 80053 COMPREHEN METABOLIC PANEL: CPT

## 2019-12-15 LAB
ALBUMIN SERPL-MCNC: 4 G/DL (ref 3.6–5.1)
SHBG SERPL-SCNC: 44 NMOL/L (ref 10–50)
TESTOST FREE SERPL-MCNC: 23.9 PG/ML (ref 46–224)
TESTOST SERPL-MCNC: 238 NG/DL (ref 250–1100)
TESTOSTERONE.FREE+WB SERPL-MCNC: 44 NG/DL (ref 110–575)

## 2019-12-24 ENCOUNTER — TELEPHONE (OUTPATIENT)
Dept: UROLOGY | Facility: CLINIC | Age: 54
End: 2019-12-24

## 2019-12-24 NOTE — TELEPHONE ENCOUNTER
----- Message from Will Proctor MD sent at 12/20/2019  5:17 PM CST -----  Hey, can we get this patient scheduled for Resident Clinic next week?    Thanks,  Will Proctor

## 2019-12-27 ENCOUNTER — OFFICE VISIT (OUTPATIENT)
Dept: UROLOGY | Facility: CLINIC | Age: 54
End: 2019-12-27
Payer: COMMERCIAL

## 2019-12-27 VITALS
HEIGHT: 71 IN | BODY MASS INDEX: 44.1 KG/M2 | SYSTOLIC BLOOD PRESSURE: 118 MMHG | WEIGHT: 315 LBS | DIASTOLIC BLOOD PRESSURE: 70 MMHG | HEART RATE: 71 BPM

## 2019-12-27 DIAGNOSIS — E29.1 HYPOGONADISM IN MALE: Primary | ICD-10-CM

## 2019-12-27 DIAGNOSIS — E66.01 MORBID OBESITY WITH BMI OF 40.0-44.9, ADULT: ICD-10-CM

## 2019-12-27 DIAGNOSIS — N52.9 ERECTILE DYSFUNCTION, UNSPECIFIED ERECTILE DYSFUNCTION TYPE: ICD-10-CM

## 2019-12-27 PROCEDURE — 99214 OFFICE O/P EST MOD 30 MIN: CPT | Mod: 25,S$GLB,, | Performed by: UROLOGY

## 2019-12-27 PROCEDURE — 96372 THER/PROPH/DIAG INJ SC/IM: CPT | Mod: S$GLB,,, | Performed by: UROLOGY

## 2019-12-27 PROCEDURE — 96372 PR INJECTION,THERAP/PROPH/DIAG2ST, IM OR SUBCUT: ICD-10-PCS | Mod: S$GLB,,, | Performed by: UROLOGY

## 2019-12-27 PROCEDURE — 99999 PR PBB SHADOW E&M-EST. PATIENT-LVL III: ICD-10-PCS | Mod: PBBFAC,,,

## 2019-12-27 PROCEDURE — 99214 PR OFFICE/OUTPT VISIT, EST, LEVL IV, 30-39 MIN: ICD-10-PCS | Mod: 25,S$GLB,, | Performed by: UROLOGY

## 2019-12-27 PROCEDURE — 3008F PR BODY MASS INDEX (BMI) DOCUMENTED: ICD-10-PCS | Mod: CPTII,S$GLB,, | Performed by: UROLOGY

## 2019-12-27 PROCEDURE — 3008F BODY MASS INDEX DOCD: CPT | Mod: CPTII,S$GLB,, | Performed by: UROLOGY

## 2019-12-27 PROCEDURE — 99999 PR PBB SHADOW E&M-EST. PATIENT-LVL III: CPT | Mod: PBBFAC,,,

## 2019-12-27 RX ORDER — EFINACONAZOLE 100 MG/ML
SOLUTION TOPICAL
COMMUNITY
Start: 2019-10-09 | End: 2020-09-01

## 2019-12-27 RX ORDER — TESTOSTERONE CYPIONATE 1000 MG/10ML
200 INJECTION, SOLUTION INTRAMUSCULAR ONCE
Status: COMPLETED | OUTPATIENT
Start: 2019-12-27 | End: 2019-12-27

## 2019-12-27 RX ADMIN — TESTOSTERONE CYPIONATE 200 MG: 1000 INJECTION, SOLUTION INTRAMUSCULAR at 01:12

## 2019-12-27 NOTE — PROGRESS NOTES
Urology - [facility]  Resident Clinic  Staff - Solo Benitez MD    SUBJECTIVE:     Chief Complaint: Decreased libido    History of Present Illness:  Topher Cr is a 54 y.o. male who presents to clinic for follow-up regarding low-libido. He is Established  to our clinic.     He has been experiencing ED and decreased libido x 1 year. He was prescribed Viagra 100 mg by his PCP, Dr. Chacon; he has not tried this yet as he is worried about the possibilty of a headache. He had an AM testosterone drawn in 12/2019 which was 238 and bioavailable was 86. He is interested in pursuing testosterone replacement therapy. He is not interested in having any children.    PSA in 0.33 was 7/2019.    He denies fatigue or malaise. He does not drink alcohol or use tobacco or marijuana. No recreational drug use. He reports snoring, does not engage in much physical activity. BMI is 46.      Review of patient's allergies indicates:   Allergen Reactions    Cucumber fruit extract Hives and Itching       Past Medical History:   Diagnosis Date    Allergy     Blood clotting tendency     DJD (degenerative joint disease)     Hyperlipidemia     Low back strain, initial encounter 5/16/2019    Morbid obesity with BMI of 40.0-44.9, adult 3/29/2018    Obesity     Urticaria      Past Surgical History:   Procedure Laterality Date    COLONOSCOPY N/A 2/15/2019    Procedure: COLONOSCOPY;  Surgeon: Boris Carey MD;  Location: [facility];  Service: Endoscopy;  Laterality: N/A;    ESOPHAGOGASTRODUODENOSCOPY N/A 2/15/2019    Procedure: ESOPHAGOGASTRODUODENOSCOPY (EGD);  Surgeon: Boris Carey MD;  Location: [facility];  Service: Endoscopy;  Laterality: N/A;    KNEE SURGERY Right      Family History   Problem Relation Age of Onset    Asthma Mother     Heart disease Father     Asthma Brother     Colon cancer Neg Hx     Esophageal cancer Neg Hx     Rectal cancer Neg Hx     Stomach cancer Neg Hx     Ulcerative colitis Neg Hx      "Irritable bowel syndrome Neg Hx     Crohn's disease Neg Hx     Celiac disease Neg Hx      Social History     Tobacco Use    Smoking status: Never Smoker    Smokeless tobacco: Never Used   Substance Use Topics    Alcohol use: No     Alcohol/week: 0.0 standard drinks    Drug use: No        Review of Systems   Constitutional: Negative for chills, fever and unexpected weight change.   HENT: Negative for trouble swallowing.    Eyes: Negative for visual disturbance.   Respiratory: Negative for shortness of breath.    Cardiovascular: Negative for chest pain.   Gastrointestinal: Negative for nausea and vomiting.   Genitourinary: Negative for decreased urine volume, difficulty urinating, flank pain and hematuria.   Musculoskeletal: Positive for arthralgias.   Neurological: Negative for dizziness and light-headedness.   Hematological: Does not bruise/bleed easily.   Psychiatric/Behavioral: Negative for confusion.       OBJECTIVE:     Anticoagulation:  No    Estimated body mass index is 46.03 kg/m² as calculated from the following:    Height as of this encounter: 5' 11" (1.803 m).    Weight as of this encounter: 149.7 kg (330 lb).    Vital Signs (Most Recent)  Pulse: 71 (12/27/19 1311)  BP: 118/70 (12/27/19 1311)    Physical Exam   Constitutional: He is oriented to person, place, and time. He appears well-developed and well-nourished.   obese   HENT:   Head: Normocephalic and atraumatic.   Eyes: Conjunctivae are normal.   Neck: Normal range of motion.   Cardiovascular: Normal rate.    Pulmonary/Chest: Effort normal. No respiratory distress.   Abdominal: Soft. There is no tenderness.   Obese, protruberant abdomen   Musculoskeletal: Normal range of motion.   Neurological: He is alert and oriented to person, place, and time.   Skin: Skin is warm and dry.     Psychiatric: He has a normal mood and affect. His behavior is normal. Judgment and thought content normal.       BMP  Lab Results   Component Value Date     " 12/12/2019    K 4.2 12/12/2019     12/12/2019    CO2 28 12/12/2019    BUN 10 12/12/2019    CREATININE 0.9 12/12/2019    CALCIUM 8.9 12/12/2019    ANIONGAP 5 (L) 12/12/2019    ESTGFRAFRICA >60.0 12/12/2019    EGFRNONAA >60.0 12/12/2019       Lab Results   Component Value Date    WBC 4.44 12/12/2019    HGB 12.1 (L) 12/12/2019    HCT 40.3 12/12/2019    MCV 77 (L) 12/12/2019     12/12/2019       Lab Results   Component Value Date    PSA 0.33 07/18/2019    PSA 0.25 07/12/2016    PSA 0.33 06/23/2015    PSA 0.37 09/14/2011    PSA 0.40 01/06/2010    PSA 0.36 09/30/2008       Imaging:    N/A    ASSESSMENT     1. Hypogonadism in male    2. Morbid obesity with BMI of 40.0-44.9, adult    3. Erectile dysfunction, unspecified erectile dysfunction type        PLAN:     1) Hypogonadism  - after discussion of patient's options, including Androgel, IM injections, and Testopel, he wishes to pursue IM injections. 200 mg IM testosterone cypionate today, will f/u in 2 weeks with repeat AM T.    2) Erectile dysfunction  - Continue sildenafil; patient says he will try this now.    3) Screening for prostate cancer  - Continue PSA screening via patient's PCP    4) Morbid obesity  - Encouraged weight loss via calorie restriction and exercise as his symptoms are likely exacerbated by his obesity.    Will Proctor MD

## 2020-01-10 ENCOUNTER — OFFICE VISIT (OUTPATIENT)
Dept: UROLOGY | Facility: CLINIC | Age: 55
End: 2020-01-10
Payer: COMMERCIAL

## 2020-01-10 ENCOUNTER — LAB VISIT (OUTPATIENT)
Dept: LAB | Facility: HOSPITAL | Age: 55
End: 2020-01-10
Payer: COMMERCIAL

## 2020-01-10 VITALS
SYSTOLIC BLOOD PRESSURE: 122 MMHG | HEART RATE: 63 BPM | BODY MASS INDEX: 44.1 KG/M2 | WEIGHT: 315 LBS | HEIGHT: 71 IN | DIASTOLIC BLOOD PRESSURE: 70 MMHG

## 2020-01-10 DIAGNOSIS — E29.1 HYPOGONADISM IN MALE: Primary | ICD-10-CM

## 2020-01-10 DIAGNOSIS — Z12.5 SCREENING FOR MALIGNANT NEOPLASM OF PROSTATE: ICD-10-CM

## 2020-01-10 DIAGNOSIS — E66.01 MORBID OBESITY WITH BMI OF 40.0-44.9, ADULT: ICD-10-CM

## 2020-01-10 DIAGNOSIS — E29.1 HYPOGONADISM IN MALE: ICD-10-CM

## 2020-01-10 DIAGNOSIS — N52.01 ERECTILE DYSFUNCTION DUE TO ARTERIAL INSUFFICIENCY: ICD-10-CM

## 2020-01-10 LAB — TESTOST SERPL-MCNC: 400 NG/DL (ref 304–1227)

## 2020-01-10 PROCEDURE — 84403 ASSAY OF TOTAL TESTOSTERONE: CPT

## 2020-01-10 PROCEDURE — 99999 PR PBB SHADOW E&M-EST. PATIENT-LVL II: ICD-10-PCS | Mod: PBBFAC,,,

## 2020-01-10 PROCEDURE — 99999 PR PBB SHADOW E&M-EST. PATIENT-LVL II: CPT | Mod: PBBFAC,,,

## 2020-01-10 PROCEDURE — 3008F BODY MASS INDEX DOCD: CPT | Mod: CPTII,S$GLB,, | Performed by: UROLOGY

## 2020-01-10 PROCEDURE — 99213 PR OFFICE/OUTPT VISIT, EST, LEVL III, 20-29 MIN: ICD-10-PCS | Mod: S$GLB,,, | Performed by: UROLOGY

## 2020-01-10 PROCEDURE — 99213 OFFICE O/P EST LOW 20 MIN: CPT | Mod: S$GLB,,, | Performed by: UROLOGY

## 2020-01-10 PROCEDURE — 36415 COLL VENOUS BLD VENIPUNCTURE: CPT | Mod: PN

## 2020-01-10 PROCEDURE — 3008F PR BODY MASS INDEX (BMI) DOCUMENTED: ICD-10-PCS | Mod: CPTII,S$GLB,, | Performed by: UROLOGY

## 2020-01-10 RX ORDER — TADALAFIL 5 MG/1
5 TABLET ORAL DAILY
Qty: 30 TABLET | Refills: 2 | Status: SHIPPED | OUTPATIENT
Start: 2020-01-10 | End: 2020-08-03

## 2020-01-10 RX ORDER — TADALAFIL 5 MG/1
5 TABLET ORAL DAILY
Qty: 30 TABLET | Refills: 2 | Status: SHIPPED | OUTPATIENT
Start: 2020-01-10 | End: 2020-01-10

## 2020-01-10 NOTE — PROGRESS NOTES
Urology - [facility]  Resident Clinic  Staff - Tyler Mcgarry MD    SUBJECTIVE:     Chief Complaint: Decreased libido    History of Present Illness:  Topher Cr is a 54 y.o. male who presents to clinic for follow-up regarding low-libido. He is Established  to our clinic.    He has been experiencing ED, fatigue, and decreased libido x 1 year. He had an AM testosterone drawn in 12/2019 which was 238 and bioavailable was 86. He is interested in pursuing testosterone replacement therapy. He is not interested in having any children. He received 200 mg IM testosterone cypionate on 12/27. His T from this AM is pending.  He states that he has noticed no difference in his his libido or energy since his injection. He took viagra a few times but has had difficulty taking it on an empty stomach. He is not interested in ICI, BEVERLY, or IPP but is interested in trying another pill.    PSA was 0.33 in 7/2019.    He does not drink alcohol or use tobacco or marijuana. No recreational drug use. He reports snoring, does not engage in much physical activity. BMI is 46.      Review of patient's allergies indicates:   Allergen Reactions    Cucumber fruit extract Hives and Itching       Past Medical History:   Diagnosis Date    Allergy     Blood clotting tendency     DJD (degenerative joint disease)     Hyperlipidemia     Low back strain, initial encounter 5/16/2019    Morbid obesity with BMI of 40.0-44.9, adult 3/29/2018    Obesity     Urticaria      Past Surgical History:   Procedure Laterality Date    COLONOSCOPY N/A 2/15/2019    Procedure: COLONOSCOPY;  Surgeon: Boris Carey MD;  Location: [facility];  Service: Endoscopy;  Laterality: N/A;    ESOPHAGOGASTRODUODENOSCOPY N/A 2/15/2019    Procedure: ESOPHAGOGASTRODUODENOSCOPY (EGD);  Surgeon: Boris Carey MD;  Location: [facility];  Service: Endoscopy;  Laterality: N/A;    KNEE SURGERY Right      Family History   Problem Relation Age of Onset    Asthma Mother   "   Heart disease Father     Asthma Brother     Colon cancer Neg Hx     Esophageal cancer Neg Hx     Rectal cancer Neg Hx     Stomach cancer Neg Hx     Ulcerative colitis Neg Hx     Irritable bowel syndrome Neg Hx     Crohn's disease Neg Hx     Celiac disease Neg Hx      Social History     Tobacco Use    Smoking status: Never Smoker    Smokeless tobacco: Never Used   Substance Use Topics    Alcohol use: No     Alcohol/week: 0.0 standard drinks    Drug use: No        Review of Systems   Constitutional: Negative for chills, fever and unexpected weight change.   HENT: Negative for trouble swallowing.    Eyes: Negative for visual disturbance.   Respiratory: Negative for shortness of breath.    Cardiovascular: Negative for chest pain.   Gastrointestinal: Negative for nausea and vomiting.   Genitourinary: Negative for decreased urine volume, difficulty urinating, flank pain and hematuria.   Musculoskeletal: Positive for arthralgias.   Neurological: Negative for dizziness and light-headedness.   Hematological: Does not bruise/bleed easily.   Psychiatric/Behavioral: Negative for confusion.       OBJECTIVE:     Anticoagulation:  No    Estimated body mass index is 46.03 kg/m² as calculated from the following:    Height as of this encounter: 5' 11" (1.803 m).    Weight as of this encounter: 149.7 kg (330 lb).    Vital Signs (Most Recent)  Pulse: 63 (01/10/20 1337)  BP: 122/70 (01/10/20 1337)    Physical Exam   Constitutional: He is oriented to person, place, and time. He appears well-developed and well-nourished.   obese   HENT:   Head: Normocephalic and atraumatic.   Eyes: Conjunctivae are normal.   Neck: Normal range of motion.   Cardiovascular: Normal rate.    Pulmonary/Chest: Effort normal. No respiratory distress.   Abdominal: Soft. There is no tenderness.   Obese, protruberant abdomen   Musculoskeletal: Normal range of motion.   Neurological: He is alert and oriented to person, place, and time.   Skin: Skin " is warm and dry.     Psychiatric: He has a normal mood and affect. His behavior is normal. Judgment and thought content normal.       BMP  Lab Results   Component Value Date     12/12/2019    K 4.2 12/12/2019     12/12/2019    CO2 28 12/12/2019    BUN 10 12/12/2019    CREATININE 0.9 12/12/2019    CALCIUM 8.9 12/12/2019    ANIONGAP 5 (L) 12/12/2019    ESTGFRAFRICA >60.0 12/12/2019    EGFRNONAA >60.0 12/12/2019       Lab Results   Component Value Date    WBC 4.44 12/12/2019    HGB 12.1 (L) 12/12/2019    HCT 40.3 12/12/2019    MCV 77 (L) 12/12/2019     12/12/2019       Lab Results   Component Value Date    PSA 0.33 07/18/2019    PSA 0.25 07/12/2016    PSA 0.33 06/23/2015    PSA 0.37 09/14/2011    PSA 0.40 01/06/2010    PSA 0.36 09/30/2008       Imaging:    N/A    ASSESSMENT     1. Hypogonadism in male    2. Erectile dysfunction due to arterial insufficiency    3. Screening for malignant neoplasm of prostate    4. Morbid obesity with BMI of 40.0-44.9, adult        PLAN:     1) Hypogonadism  - Patient has experienced no symptomatic improvement s/p testosterone injection. I explained to him in depth that the risks > benefits of T replacement for a patient who experiences no symptomatic improvement. Will not pursue further replacement therapy and urged patient to f/u with his PCP for further evaluation of his fatigue. He voiced understanding.    2) Erectile dysfunction  - Dc sildenafil  - Rx'd cialis to [facility] in [place], [place] due to improved pharmacokinetics compared to sildenafil  - Instructed patient to message me on [facility] regarding his symptoms    3) Screening for prostate cancer  - Continue PSA screening via patient's PCP    4) Morbid obesity  - Encouraged weight loss via calorie restriction and exercise as his symptoms are likely exacerbated by his obesity.    F/u PRN    Benito Orozco MD

## 2020-05-21 ENCOUNTER — TELEPHONE (OUTPATIENT)
Dept: PRIMARY CARE CLINIC | Facility: CLINIC | Age: 55
End: 2020-05-21

## 2020-05-21 DIAGNOSIS — Z11.4 SCREENING FOR HIV WITHOUT PRESENCE OF RISK FACTORS: ICD-10-CM

## 2020-05-21 DIAGNOSIS — R73.03 PREDIABETES: Primary | ICD-10-CM

## 2020-05-30 ENCOUNTER — PATIENT MESSAGE (OUTPATIENT)
Dept: PRIMARY CARE CLINIC | Facility: CLINIC | Age: 55
End: 2020-05-30

## 2020-06-03 ENCOUNTER — TELEPHONE (OUTPATIENT)
Dept: PRIMARY CARE CLINIC | Facility: CLINIC | Age: 55
End: 2020-06-03

## 2020-06-03 ENCOUNTER — PATIENT MESSAGE (OUTPATIENT)
Dept: PRIMARY CARE CLINIC | Facility: CLINIC | Age: 55
End: 2020-06-03

## 2020-06-03 DIAGNOSIS — Z00.00 ROUTINE GENERAL MEDICAL EXAMINATION AT A HEALTH CARE FACILITY: Primary | ICD-10-CM

## 2020-07-30 ENCOUNTER — LAB VISIT (OUTPATIENT)
Dept: LAB | Facility: HOSPITAL | Age: 55
End: 2020-07-30
Attending: FAMILY MEDICINE
Payer: COMMERCIAL

## 2020-07-30 DIAGNOSIS — Z11.4 SCREENING FOR HIV WITHOUT PRESENCE OF RISK FACTORS: ICD-10-CM

## 2020-07-30 DIAGNOSIS — R73.03 PREDIABETES: ICD-10-CM

## 2020-07-30 DIAGNOSIS — Z00.00 ROUTINE GENERAL MEDICAL EXAMINATION AT A HEALTH CARE FACILITY: ICD-10-CM

## 2020-07-30 LAB
ALBUMIN SERPL BCP-MCNC: 3.9 G/DL (ref 3.5–5.2)
ALP SERPL-CCNC: 45 U/L (ref 55–135)
ALT SERPL W/O P-5'-P-CCNC: 49 U/L (ref 10–44)
ANION GAP SERPL CALC-SCNC: 7 MMOL/L (ref 8–16)
AST SERPL-CCNC: 44 U/L (ref 10–40)
BASOPHILS # BLD AUTO: 0.08 K/UL (ref 0–0.2)
BASOPHILS NFR BLD: 1.5 % (ref 0–1.9)
BILIRUB SERPL-MCNC: 0.5 MG/DL (ref 0.1–1)
BUN SERPL-MCNC: 11 MG/DL (ref 6–20)
CALCIUM SERPL-MCNC: 9.2 MG/DL (ref 8.7–10.5)
CHLORIDE SERPL-SCNC: 105 MMOL/L (ref 95–110)
CHOLEST SERPL-MCNC: 181 MG/DL (ref 120–199)
CHOLEST/HDLC SERPL: 4.6 {RATIO} (ref 2–5)
CO2 SERPL-SCNC: 28 MMOL/L (ref 23–29)
COMPLEXED PSA SERPL-MCNC: 0.38 NG/ML (ref 0–4)
CREAT SERPL-MCNC: 0.9 MG/DL (ref 0.5–1.4)
DIFFERENTIAL METHOD: ABNORMAL
EOSINOPHIL # BLD AUTO: 0.2 K/UL (ref 0–0.5)
EOSINOPHIL NFR BLD: 3.4 % (ref 0–8)
ERYTHROCYTE [DISTWIDTH] IN BLOOD BY AUTOMATED COUNT: 15.3 % (ref 11.5–14.5)
EST. GFR  (AFRICAN AMERICAN): >60 ML/MIN/1.73 M^2
EST. GFR  (NON AFRICAN AMERICAN): >60 ML/MIN/1.73 M^2
ESTIMATED AVG GLUCOSE: 128 MG/DL (ref 68–131)
GLUCOSE SERPL-MCNC: 99 MG/DL (ref 70–110)
HBA1C MFR BLD HPLC: 6.1 % (ref 4–5.6)
HCT VFR BLD AUTO: 44 % (ref 40–54)
HDLC SERPL-MCNC: 39 MG/DL (ref 40–75)
HDLC SERPL: 21.5 % (ref 20–50)
HGB BLD-MCNC: 13.1 G/DL (ref 14–18)
IMM GRANULOCYTES # BLD AUTO: 0.06 K/UL (ref 0–0.04)
IMM GRANULOCYTES NFR BLD AUTO: 1.1 % (ref 0–0.5)
LDLC SERPL CALC-MCNC: 113.8 MG/DL (ref 63–159)
LYMPHOCYTES # BLD AUTO: 2.7 K/UL (ref 1–4.8)
LYMPHOCYTES NFR BLD: 49.8 % (ref 18–48)
MCH RBC QN AUTO: 23.2 PG (ref 27–31)
MCHC RBC AUTO-ENTMCNC: 29.8 G/DL (ref 32–36)
MCV RBC AUTO: 78 FL (ref 82–98)
MONOCYTES # BLD AUTO: 0.6 K/UL (ref 0.3–1)
MONOCYTES NFR BLD: 10.3 % (ref 4–15)
NEUTROPHILS # BLD AUTO: 1.8 K/UL (ref 1.8–7.7)
NEUTROPHILS NFR BLD: 33.9 % (ref 38–73)
NONHDLC SERPL-MCNC: 142 MG/DL
NRBC BLD-RTO: 0 /100 WBC
PLATELET # BLD AUTO: 355 K/UL (ref 150–350)
PMV BLD AUTO: 10.6 FL (ref 9.2–12.9)
POTASSIUM SERPL-SCNC: 4.3 MMOL/L (ref 3.5–5.1)
PROT SERPL-MCNC: 7.4 G/DL (ref 6–8.4)
RBC # BLD AUTO: 5.65 M/UL (ref 4.6–6.2)
SODIUM SERPL-SCNC: 140 MMOL/L (ref 136–145)
TRIGL SERPL-MCNC: 141 MG/DL (ref 30–150)
TSH SERPL DL<=0.005 MIU/L-ACNC: 1.23 UIU/ML (ref 0.4–4)
WBC # BLD AUTO: 5.36 K/UL (ref 3.9–12.7)

## 2020-07-30 PROCEDURE — 84153 ASSAY OF PSA TOTAL: CPT

## 2020-07-30 PROCEDURE — 83036 HEMOGLOBIN GLYCOSYLATED A1C: CPT

## 2020-07-30 PROCEDURE — 80053 COMPREHEN METABOLIC PANEL: CPT

## 2020-07-30 PROCEDURE — 80061 LIPID PANEL: CPT

## 2020-07-30 PROCEDURE — 84443 ASSAY THYROID STIM HORMONE: CPT

## 2020-07-30 PROCEDURE — 85025 COMPLETE CBC W/AUTO DIFF WBC: CPT

## 2020-07-30 PROCEDURE — 36415 COLL VENOUS BLD VENIPUNCTURE: CPT | Mod: PN

## 2020-07-30 PROCEDURE — 86703 HIV-1/HIV-2 1 RESULT ANTBDY: CPT

## 2020-07-31 LAB — HIV 1+2 AB+HIV1 P24 AG SERPL QL IA: NEGATIVE

## 2020-08-03 ENCOUNTER — OFFICE VISIT (OUTPATIENT)
Dept: PRIMARY CARE CLINIC | Facility: CLINIC | Age: 55
End: 2020-08-03
Payer: COMMERCIAL

## 2020-08-03 VITALS
SYSTOLIC BLOOD PRESSURE: 115 MMHG | WEIGHT: 315 LBS | HEART RATE: 69 BPM | BODY MASS INDEX: 44.1 KG/M2 | TEMPERATURE: 97 F | HEIGHT: 71 IN | OXYGEN SATURATION: 99 % | DIASTOLIC BLOOD PRESSURE: 70 MMHG

## 2020-08-03 DIAGNOSIS — Z23 NEED FOR SHINGLES VACCINE: ICD-10-CM

## 2020-08-03 DIAGNOSIS — E29.1 HYPOGONADISM IN MALE: ICD-10-CM

## 2020-08-03 DIAGNOSIS — E66.01 MORBID OBESITY WITH BMI OF 40.0-44.9, ADULT: ICD-10-CM

## 2020-08-03 DIAGNOSIS — Z00.00 ROUTINE GENERAL MEDICAL EXAMINATION AT A HEALTH CARE FACILITY: Primary | ICD-10-CM

## 2020-08-03 PROCEDURE — 99396 PR PREVENTIVE VISIT,EST,40-64: ICD-10-PCS | Mod: 25,S$GLB,, | Performed by: FAMILY MEDICINE

## 2020-08-03 PROCEDURE — 90750 HZV VACC RECOMBINANT IM: CPT | Mod: S$GLB,,, | Performed by: FAMILY MEDICINE

## 2020-08-03 PROCEDURE — 99999 PR PBB SHADOW E&M-EST. PATIENT-LVL III: ICD-10-PCS | Mod: PBBFAC,,, | Performed by: FAMILY MEDICINE

## 2020-08-03 PROCEDURE — 90471 IMMUNIZATION ADMIN: CPT | Mod: S$GLB,,, | Performed by: FAMILY MEDICINE

## 2020-08-03 PROCEDURE — 3008F PR BODY MASS INDEX (BMI) DOCUMENTED: ICD-10-PCS | Mod: CPTII,S$GLB,, | Performed by: FAMILY MEDICINE

## 2020-08-03 PROCEDURE — 99396 PREV VISIT EST AGE 40-64: CPT | Mod: 25,S$GLB,, | Performed by: FAMILY MEDICINE

## 2020-08-03 PROCEDURE — 99999 PR PBB SHADOW E&M-EST. PATIENT-LVL III: CPT | Mod: PBBFAC,,, | Performed by: FAMILY MEDICINE

## 2020-08-03 PROCEDURE — 90471 ZOSTER RECOMBINANT VACCINE: ICD-10-PCS | Mod: S$GLB,,, | Performed by: FAMILY MEDICINE

## 2020-08-03 PROCEDURE — 3008F BODY MASS INDEX DOCD: CPT | Mod: CPTII,S$GLB,, | Performed by: FAMILY MEDICINE

## 2020-08-03 PROCEDURE — 90750 ZOSTER RECOMBINANT VACCINE: ICD-10-PCS | Mod: S$GLB,,, | Performed by: FAMILY MEDICINE

## 2020-08-03 RX ORDER — TADALAFIL 20 MG/1
20 TABLET ORAL DAILY
Qty: 10 TABLET | Refills: 11 | Status: SHIPPED | OUTPATIENT
Start: 2020-08-03 | End: 2021-09-22

## 2020-08-03 NOTE — PROGRESS NOTES
Subjective:       Patient ID: Topher Cr is a 55 y.o. male.    Chief Complaint: Annual Exam    56 yo presents for routine exam   Morbid obesity, plans to start walking program    Review of Systems   Constitutional: Negative for activity change, appetite change, chills, fatigue, fever and unexpected weight change.   HENT: Negative for nasal congestion, ear discharge, ear pain, hearing loss, rhinorrhea, sinus pressure/congestion and trouble swallowing.    Eyes: Negative for pain, discharge and visual disturbance.   Respiratory: Negative for cough, chest tightness, shortness of breath and wheezing.    Cardiovascular: Negative for chest pain, palpitations and leg swelling.   Gastrointestinal: Negative for abdominal distention, abdominal pain, blood in stool, constipation, diarrhea and vomiting.   Endocrine: Negative for polydipsia and polyuria.   Genitourinary: Positive for erectile dysfunction. Negative for difficulty urinating, dysuria, frequency, hematuria and urgency.        Has tried Viagra 100mg, Cialis 5mg without improvement of symptoms, hypogonadism   Musculoskeletal: Negative for arthralgias, back pain, gait problem, joint swelling, myalgias, neck pain and neck stiffness.   Integumentary:  Negative for rash.   Neurological: Negative for dizziness, tremors, speech difficulty, weakness, numbness and headaches.   Psychiatric/Behavioral: Negative for agitation, behavioral problems, confusion and dysphoric mood.         Objective:      Physical Exam  Constitutional:       Appearance: He is well-developed.      Comments: Morbidly obese   HENT:      Head: Normocephalic.      Right Ear: Tympanic membrane, ear canal and external ear normal.      Left Ear: Tympanic membrane, ear canal and external ear normal.      Nose: Nose normal.      Mouth/Throat:      Pharynx: Uvula midline. No posterior oropharyngeal erythema.   Eyes:      Conjunctiva/sclera: Conjunctivae normal.      Pupils: Pupils are equal, round, and  reactive to light.   Neck:      Musculoskeletal: Normal range of motion and neck supple.      Thyroid: No thyroid mass or thyromegaly.      Vascular: No carotid bruit or JVD.   Cardiovascular:      Rate and Rhythm: Normal rate and regular rhythm.      Heart sounds: Normal heart sounds. No murmur.   Pulmonary:      Effort: Pulmonary effort is normal.      Breath sounds: Normal breath sounds. No rales.   Abdominal:      General: Bowel sounds are normal.      Palpations: Abdomen is soft. There is no mass.      Tenderness: There is no abdominal tenderness.      Hernia: There is no hernia in the left inguinal area.   Genitourinary:     Penis: Normal.       Scrotum/Testes: Normal.         Right: Mass or tenderness not present.         Left: Mass or tenderness not present.   Musculoskeletal: Normal range of motion.   Lymphadenopathy:      Cervical: No cervical adenopathy.      Lower Body: No right inguinal adenopathy. No left inguinal adenopathy.   Skin:     General: Skin is warm.      Findings: No lesion or rash.   Neurological:      Mental Status: He is alert and oriented to person, place, and time.      Cranial Nerves: No cranial nerve deficit.      Deep Tendon Reflexes: Reflexes are normal and symmetric.         Assessment:       1. Routine general medical examination at a health care facility    2. Morbid obesity with BMI of 40.0-44.9, adult    3. Hypogonadism in male        Plan:       1.  Labs reviewed with pt  2.  Encourage weight loss, aerobic exercise  3.  Repeat A1C in 4 months  4.  Trial of Cialis 20mg    Answers for HPI/ROS submitted by the patient on 7/31/2020   activity change: No  unexpected weight change: No  neck pain: No  hearing loss: No  rhinorrhea: No  trouble swallowing: No  eye discharge: No  visual disturbance: No  chest tightness: No  wheezing: No  chest pain: No  palpitations: No  blood in stool: No  constipation: No  vomiting: No  diarrhea: No  polydipsia: No  polyuria: No  difficulty urinating:  No  urgency: No  hematuria: No  joint swelling: No  arthralgias: No  headaches: No  weakness: No  confusion: No  dysphoric mood: No

## 2020-08-03 NOTE — PROGRESS NOTES
Two patient identifiers verified.  Allergies reviewed. shingrix #1  IM administered to left deltoid per MD order.  Patient tolerated injection well; no redness, bleeding, or bruising noted to injection site.  Patient instructed to remain in clinic setting for 15 minutes.  Verbalizes understanding.

## 2020-08-26 ENCOUNTER — TELEPHONE (OUTPATIENT)
Dept: PRIMARY CARE CLINIC | Facility: CLINIC | Age: 55
End: 2020-08-26

## 2020-09-01 ENCOUNTER — OFFICE VISIT (OUTPATIENT)
Dept: PRIMARY CARE CLINIC | Facility: CLINIC | Age: 55
End: 2020-09-01
Payer: COMMERCIAL

## 2020-09-01 VITALS
BODY MASS INDEX: 44.1 KG/M2 | HEART RATE: 63 BPM | DIASTOLIC BLOOD PRESSURE: 74 MMHG | OXYGEN SATURATION: 99 % | TEMPERATURE: 98 F | SYSTOLIC BLOOD PRESSURE: 124 MMHG | HEIGHT: 71 IN | WEIGHT: 315 LBS

## 2020-09-01 DIAGNOSIS — E66.01 CLASS 3 SEVERE OBESITY DUE TO EXCESS CALORIES WITHOUT SERIOUS COMORBIDITY WITH BODY MASS INDEX (BMI) OF 45.0 TO 49.9 IN ADULT: ICD-10-CM

## 2020-09-01 DIAGNOSIS — R51.9 NONINTRACTABLE HEADACHE, UNSPECIFIED CHRONICITY PATTERN, UNSPECIFIED HEADACHE TYPE: Primary | ICD-10-CM

## 2020-09-01 PROCEDURE — 3008F BODY MASS INDEX DOCD: CPT | Mod: CPTII,S$GLB,, | Performed by: FAMILY MEDICINE

## 2020-09-01 PROCEDURE — 99999 PR PBB SHADOW E&M-EST. PATIENT-LVL III: CPT | Mod: PBBFAC,,, | Performed by: FAMILY MEDICINE

## 2020-09-01 PROCEDURE — 99213 PR OFFICE/OUTPT VISIT, EST, LEVL III, 20-29 MIN: ICD-10-PCS | Mod: S$GLB,,, | Performed by: FAMILY MEDICINE

## 2020-09-01 PROCEDURE — 3008F PR BODY MASS INDEX (BMI) DOCUMENTED: ICD-10-PCS | Mod: CPTII,S$GLB,, | Performed by: FAMILY MEDICINE

## 2020-09-01 PROCEDURE — 99999 PR PBB SHADOW E&M-EST. PATIENT-LVL III: ICD-10-PCS | Mod: PBBFAC,,, | Performed by: FAMILY MEDICINE

## 2020-09-01 PROCEDURE — 99213 OFFICE O/P EST LOW 20 MIN: CPT | Mod: S$GLB,,, | Performed by: FAMILY MEDICINE

## 2020-09-01 RX ORDER — MECLIZINE HYDROCHLORIDE 25 MG/1
25 TABLET ORAL 3 TIMES DAILY PRN
COMMUNITY
Start: 2020-08-26 | End: 2021-09-22

## 2020-09-01 RX ORDER — DICLOFENAC SODIUM 75 MG/1
75 TABLET, DELAYED RELEASE ORAL 2 TIMES DAILY
Qty: 20 TABLET | Refills: 0 | Status: SHIPPED | OUTPATIENT
Start: 2020-09-01 | End: 2021-09-22

## 2020-09-01 NOTE — PROGRESS NOTES
Subjective:   Patient ID: Topher Cr is a 55 y.o. male.    Chief Complaint: Headache and Dizziness      Headache   This is a new problem. The current episode started 1 to 4 weeks ago. The problem occurs intermittently. The problem has been unchanged. The pain is located in the left unilateral region. The pain does not radiate. The pain quality is not similar to prior headaches. The quality of the pain is described as aching, throbbing, shooting and stabbing. The pain is at a severity of 4/10. The pain is moderate. Associated symptoms include dizziness. Pertinent negatives include no abdominal pain, abnormal behavior, anorexia, back pain, blurred vision, coughing, drainage, ear pain, eye pain, eye redness, eye watering, facial sweating, fever, hearing loss, insomnia, loss of balance, muscle aches, nausea, neck pain, numbness, phonophobia, photophobia, rhinorrhea, scalp tenderness, seizures, sinus pressure, sore throat, swollen glands, tingling, tinnitus, visual change, vomiting, weakness or weight loss. Nothing aggravates the symptoms. He has tried nothing for the symptoms. The treatment provided no relief. There is no history of cancer, cluster headaches, hypertension, immunosuppression, migraine headaches, migraines in the family, obesity, pseudotumor cerebri, recent head traumas, sinus disease or TMJ.   Dizziness:    Associated symptoms: headaches.no hearing loss, no ear pain, no fever, no tinnitus, no nausea, no vomiting and no weakness.no head trauma and no head trauma.      Headache x 3-4 wks  No fever or chlls.  No flashing lights  No family ho migraines  No presyncope  Comes and goes  Nausea only aw vertigo.      Patient queried and denies any further complaints.    LOCATION  DURATION  SEVERITY  QUALITY  TIMING  CAUSE  ASSOCIATED SYMPTOMS  MODIFIERS.    ALLERGIES AND MEDICATIONS: updated and reviewed.  Review of patient's allergies indicates:   Allergen Reactions    Cucumber fruit extract Hives and Itching  "      Current Outpatient Medications:     tadalafiL (CIALIS) 20 MG Tab, Take 1 tablet (20 mg total) by mouth once daily., Disp: 10 tablet, Rfl: 11    diclofenac (VOLTAREN) 75 MG EC tablet, Take 1 tablet (75 mg total) by mouth 2 (two) times daily. w food and water for 3-10 days for pain, Disp: 20 tablet, Rfl: 0    meclizine (ANTIVERT) 25 mg tablet, Take 25 mg by mouth 3 (three) times daily as needed., Disp: , Rfl:     Review of Systems   Constitutional: Negative for fever and weight loss.   HENT: Negative for ear pain, hearing loss, rhinorrhea, sinus pressure, sore throat and tinnitus.    Eyes: Negative for blurred vision, photophobia, pain and redness.   Respiratory: Negative for cough.    Gastrointestinal: Negative for abdominal pain, anorexia, nausea and vomiting.   Musculoskeletal: Negative for back pain and neck pain.   Neurological: Positive for dizziness and headaches. Negative for tingling, seizures, weakness, numbness and loss of balance.   Psychiatric/Behavioral: The patient does not have insomnia.        Objective:     Vitals:    09/01/20 1342   BP: 124/74   Pulse: 63   Temp: 98.4 °F (36.9 °C)   TempSrc: Oral   SpO2: 99%   Weight: (!) 149.7 kg (330 lb 0.5 oz)   Height: 5' 11" (1.803 m)   PainSc:   3   PainLoc: Head     Body mass index is 46.03 kg/m².    Physical Exam  Vitals signs and nursing note reviewed.   Constitutional:       Appearance: Normal appearance. He is well-developed.   HENT:      Head: Normocephalic and atraumatic.      Right Ear: Hearing, tympanic membrane, ear canal and external ear normal. No decreased hearing noted. No drainage or swelling.      Left Ear: Hearing, tympanic membrane, ear canal and external ear normal. No decreased hearing noted. No drainage or swelling.      Nose: Nose normal. No rhinorrhea.      Mouth/Throat:      Pharynx: No oropharyngeal exudate or posterior oropharyngeal erythema.   Eyes:      General: Lids are normal.         Right eye: No discharge.         Left " eye: No discharge.      Conjunctiva/sclera: Conjunctivae normal.      Right eye: Right conjunctiva is not injected. No exudate.     Left eye: Left conjunctiva is not injected. No exudate.     Pupils: Pupils are equal, round, and reactive to light.   Neck:      Musculoskeletal: Full passive range of motion without pain. No edema, erythema or neck rigidity.      Thyroid: No thyroid mass or thyromegaly.      Vascular: Normal carotid pulses. No carotid bruit, hepatojugular reflux or JVD.      Trachea: Trachea normal.   Cardiovascular:      Rate and Rhythm: Normal rate and regular rhythm.      Heart sounds: Normal heart sounds.   Pulmonary:      Effort: Pulmonary effort is normal. No respiratory distress.   Abdominal:      General: Bowel sounds are normal.      Palpations: Abdomen is soft.      Tenderness: There is no abdominal tenderness. Negative signs include Elena's sign.   Lymphadenopathy:      Cervical: No cervical adenopathy.   Skin:     General: Skin is warm and dry.   Neurological:      Mental Status: He is alert.   Psychiatric:         Speech: Speech normal.         Behavior: Behavior normal.         Assessment and Plan:   Topher was seen today for headache and dizziness.    Diagnoses and all orders for this visit:    Nonintractable headache, unspecified chronicity pattern, unspecified headache type    Class 3 severe obesity due to excess calories without serious comorbidity with body mass index (BMI) of 45.0 to 49.9 in adult    Other orders  -     diclofenac (VOLTAREN) 75 MG EC tablet; Take 1 tablet (75 mg total) by mouth 2 (two) times daily. w food and water for 3-10 days for pain      Hydrate.  Over-the-counter Excedrin migraine.  If it does not work.  Diclofenac.  If he starts having early morning daily headaches then he needs to contact us.  No follow-ups on file.    THIS NOTE WILL BE SHARED WITH THE PATIENT.

## 2020-09-02 PROBLEM — E66.813 CLASS 3 SEVERE OBESITY DUE TO EXCESS CALORIES WITHOUT SERIOUS COMORBIDITY WITH BODY MASS INDEX (BMI) OF 45.0 TO 49.9 IN ADULT: Status: ACTIVE | Noted: 2020-09-02

## 2020-09-02 PROBLEM — E66.01 CLASS 3 SEVERE OBESITY DUE TO EXCESS CALORIES WITHOUT SERIOUS COMORBIDITY WITH BODY MASS INDEX (BMI) OF 45.0 TO 49.9 IN ADULT: Status: ACTIVE | Noted: 2020-09-02

## 2020-10-21 ENCOUNTER — CLINICAL SUPPORT (OUTPATIENT)
Dept: PRIMARY CARE CLINIC | Facility: CLINIC | Age: 55
End: 2020-10-21
Payer: COMMERCIAL

## 2020-10-21 DIAGNOSIS — Z23 IMMUNIZATION DUE: Primary | ICD-10-CM

## 2020-10-21 PROCEDURE — 90471 IMMUNIZATION ADMIN: CPT | Mod: S$GLB,,, | Performed by: FAMILY MEDICINE

## 2020-10-21 PROCEDURE — 90471 ZOSTER RECOMBINANT VACCINE: ICD-10-PCS | Mod: S$GLB,,, | Performed by: FAMILY MEDICINE

## 2020-10-21 PROCEDURE — 90750 ZOSTER RECOMBINANT VACCINE: ICD-10-PCS | Mod: S$GLB,,, | Performed by: FAMILY MEDICINE

## 2020-10-21 PROCEDURE — 90750 HZV VACC RECOMBINANT IM: CPT | Mod: S$GLB,,, | Performed by: FAMILY MEDICINE

## 2020-11-05 NOTE — TELEPHONE ENCOUNTER
----- Message from Lacy Julito sent at 8/26/2020  2:41 PM CDT -----  Contact: Spouse [phone]  Wife is requesting a call concerning her . State that he in in [place] but came down with vertigo. Requesting a call about getting a sooner appt.    Please call and advise.    Thank You     Pulmonology

## 2021-02-20 ENCOUNTER — IMMUNIZATION (OUTPATIENT)
Dept: PRIMARY CARE CLINIC | Facility: CLINIC | Age: 56
End: 2021-02-20
Payer: COMMERCIAL

## 2021-02-20 DIAGNOSIS — Z23 NEED FOR VACCINATION: Primary | ICD-10-CM

## 2021-02-20 PROCEDURE — 0001A PR IMMUNIZ ADMIN, SARS-COV-2 COVID-19 VACC, 30MCG/0.3ML, 1ST DOSE: CPT | Mod: PBBFAC | Performed by: INTERNAL MEDICINE

## 2021-02-20 PROCEDURE — 91300 PR SARS-COV- 2 COVID-19 VACCINE, NO PRSV, 30MCG/0.3ML, IM: CPT | Mod: PBBFAC | Performed by: INTERNAL MEDICINE

## 2021-02-20 RX ADMIN — RNA INGREDIENT BNT-162B2 0.3 ML: 0.23 INJECTION, SUSPENSION INTRAMUSCULAR at 02:02

## 2021-03-13 ENCOUNTER — IMMUNIZATION (OUTPATIENT)
Dept: PRIMARY CARE CLINIC | Facility: CLINIC | Age: 56
End: 2021-03-13
Payer: COMMERCIAL

## 2021-03-13 DIAGNOSIS — Z23 NEED FOR VACCINATION: Primary | ICD-10-CM

## 2021-03-13 PROCEDURE — 91300 PR SARS-COV- 2 COVID-19 VACCINE, NO PRSV, 30MCG/0.3ML, IM: CPT | Mod: S$GLB,,, | Performed by: INTERNAL MEDICINE

## 2021-03-13 PROCEDURE — 91300 PR SARS-COV- 2 COVID-19 VACCINE, NO PRSV, 30MCG/0.3ML, IM: ICD-10-PCS | Mod: S$GLB,,, | Performed by: INTERNAL MEDICINE

## 2021-03-13 PROCEDURE — 0002A PR IMMUNIZ ADMIN, SARS-COV-2 COVID-19 VACC, 30MCG/0.3ML, 2ND DOSE: ICD-10-PCS | Mod: CV19,S$GLB,, | Performed by: INTERNAL MEDICINE

## 2021-03-13 PROCEDURE — 0002A PR IMMUNIZ ADMIN, SARS-COV-2 COVID-19 VACC, 30MCG/0.3ML, 2ND DOSE: CPT | Mod: CV19,S$GLB,, | Performed by: INTERNAL MEDICINE

## 2021-03-13 RX ADMIN — Medication 0.3 ML: at 02:03

## 2021-05-21 ENCOUNTER — PATIENT MESSAGE (OUTPATIENT)
Dept: PRIMARY CARE CLINIC | Facility: CLINIC | Age: 56
End: 2021-05-21

## 2021-05-25 ENCOUNTER — PATIENT MESSAGE (OUTPATIENT)
Dept: PRIMARY CARE CLINIC | Facility: CLINIC | Age: 56
End: 2021-05-25

## 2021-05-31 ENCOUNTER — OFFICE VISIT (OUTPATIENT)
Dept: DERMATOLOGY | Facility: CLINIC | Age: 56
End: 2021-05-31
Payer: COMMERCIAL

## 2021-05-31 DIAGNOSIS — L30.8 OTHER ECZEMA: ICD-10-CM

## 2021-05-31 DIAGNOSIS — L21.9 SEBORRHEIC DERMATITIS: Primary | ICD-10-CM

## 2021-05-31 PROCEDURE — 99999 PR PBB SHADOW E&M-EST. PATIENT-LVL II: CPT | Mod: PBBFAC,,, | Performed by: DERMATOLOGY

## 2021-05-31 PROCEDURE — 99214 OFFICE O/P EST MOD 30 MIN: CPT | Mod: S$GLB,,, | Performed by: DERMATOLOGY

## 2021-05-31 PROCEDURE — 99999 PR PBB SHADOW E&M-EST. PATIENT-LVL II: ICD-10-PCS | Mod: PBBFAC,,, | Performed by: DERMATOLOGY

## 2021-05-31 PROCEDURE — 1126F PR PAIN SEVERITY QUANTIFIED, NO PAIN PRESENT: ICD-10-PCS | Mod: S$GLB,,, | Performed by: DERMATOLOGY

## 2021-05-31 PROCEDURE — 99214 PR OFFICE/OUTPT VISIT, EST, LEVL IV, 30-39 MIN: ICD-10-PCS | Mod: S$GLB,,, | Performed by: DERMATOLOGY

## 2021-05-31 PROCEDURE — 1126F AMNT PAIN NOTED NONE PRSNT: CPT | Mod: S$GLB,,, | Performed by: DERMATOLOGY

## 2021-05-31 RX ORDER — HYDROCORTISONE 25 MG/G
CREAM TOPICAL 2 TIMES DAILY PRN
Qty: 30 G | Refills: 3 | Status: SHIPPED | OUTPATIENT
Start: 2021-05-31 | End: 2021-09-22

## 2021-05-31 RX ORDER — TRIAMCINOLONE ACETONIDE 1 MG/G
CREAM TOPICAL 2 TIMES DAILY PRN
Qty: 45 G | Refills: 3 | Status: SHIPPED | OUTPATIENT
Start: 2021-05-31 | End: 2022-12-22

## 2021-05-31 RX ORDER — KETOCONAZOLE 20 MG/ML
SHAMPOO, SUSPENSION TOPICAL
Qty: 120 ML | Refills: 5 | Status: SHIPPED | OUTPATIENT
Start: 2021-05-31 | End: 2022-12-22

## 2021-09-22 ENCOUNTER — OFFICE VISIT (OUTPATIENT)
Dept: PRIMARY CARE CLINIC | Facility: CLINIC | Age: 56
End: 2021-09-22
Payer: COMMERCIAL

## 2021-09-22 VITALS
OXYGEN SATURATION: 99 % | DIASTOLIC BLOOD PRESSURE: 82 MMHG | BODY MASS INDEX: 45.1 KG/M2 | WEIGHT: 315 LBS | SYSTOLIC BLOOD PRESSURE: 126 MMHG | TEMPERATURE: 98 F | HEIGHT: 70 IN | HEART RATE: 64 BPM

## 2021-09-22 DIAGNOSIS — F52.0 LACK OF LIBIDO: ICD-10-CM

## 2021-09-22 DIAGNOSIS — M19.071 DJD (DEGENERATIVE JOINT DISEASE), ANKLE AND FOOT, RIGHT: ICD-10-CM

## 2021-09-22 DIAGNOSIS — N52.01 ERECTILE DYSFUNCTION DUE TO ARTERIAL INSUFFICIENCY: ICD-10-CM

## 2021-09-22 DIAGNOSIS — Z00.00 ROUTINE MEDICAL EXAM: Primary | ICD-10-CM

## 2021-09-22 DIAGNOSIS — E29.1 HYPOGONADISM IN MALE: ICD-10-CM

## 2021-09-22 DIAGNOSIS — E66.01 CLASS 3 SEVERE OBESITY DUE TO EXCESS CALORIES WITHOUT SERIOUS COMORBIDITY WITH BODY MASS INDEX (BMI) OF 45.0 TO 49.9 IN ADULT: ICD-10-CM

## 2021-09-22 PROCEDURE — 3008F PR BODY MASS INDEX (BMI) DOCUMENTED: ICD-10-PCS | Mod: CPTII,S$GLB,, | Performed by: FAMILY MEDICINE

## 2021-09-22 PROCEDURE — 1160F PR REVIEW ALL MEDS BY PRESCRIBER/CLIN PHARMACIST DOCUMENTED: ICD-10-PCS | Mod: CPTII,S$GLB,, | Performed by: FAMILY MEDICINE

## 2021-09-22 PROCEDURE — 99396 PREV VISIT EST AGE 40-64: CPT | Mod: S$GLB,,, | Performed by: FAMILY MEDICINE

## 2021-09-22 PROCEDURE — 3008F BODY MASS INDEX DOCD: CPT | Mod: CPTII,S$GLB,, | Performed by: FAMILY MEDICINE

## 2021-09-22 PROCEDURE — 3074F PR MOST RECENT SYSTOLIC BLOOD PRESSURE < 130 MM HG: ICD-10-PCS | Mod: CPTII,S$GLB,, | Performed by: FAMILY MEDICINE

## 2021-09-22 PROCEDURE — 3074F SYST BP LT 130 MM HG: CPT | Mod: CPTII,S$GLB,, | Performed by: FAMILY MEDICINE

## 2021-09-22 PROCEDURE — 3079F DIAST BP 80-89 MM HG: CPT | Mod: CPTII,S$GLB,, | Performed by: FAMILY MEDICINE

## 2021-09-22 PROCEDURE — 1159F MED LIST DOCD IN RCRD: CPT | Mod: CPTII,S$GLB,, | Performed by: FAMILY MEDICINE

## 2021-09-22 PROCEDURE — 99396 PR PREVENTIVE VISIT,EST,40-64: ICD-10-PCS | Mod: S$GLB,,, | Performed by: FAMILY MEDICINE

## 2021-09-22 PROCEDURE — 99999 PR PBB SHADOW E&M-EST. PATIENT-LVL III: ICD-10-PCS | Mod: PBBFAC,,, | Performed by: FAMILY MEDICINE

## 2021-09-22 PROCEDURE — 99999 PR PBB SHADOW E&M-EST. PATIENT-LVL III: CPT | Mod: PBBFAC,,, | Performed by: FAMILY MEDICINE

## 2021-09-22 PROCEDURE — 1159F PR MEDICATION LIST DOCUMENTED IN MEDICAL RECORD: ICD-10-PCS | Mod: CPTII,S$GLB,, | Performed by: FAMILY MEDICINE

## 2021-09-22 PROCEDURE — 1160F RVW MEDS BY RX/DR IN RCRD: CPT | Mod: CPTII,S$GLB,, | Performed by: FAMILY MEDICINE

## 2021-09-22 PROCEDURE — 3079F PR MOST RECENT DIASTOLIC BLOOD PRESSURE 80-89 MM HG: ICD-10-PCS | Mod: CPTII,S$GLB,, | Performed by: FAMILY MEDICINE

## 2021-09-24 PROBLEM — E66.01 MORBID OBESITY WITH BMI OF 40.0-44.9, ADULT: Status: RESOLVED | Noted: 2018-03-29 | Resolved: 2021-09-24

## 2021-09-27 ENCOUNTER — LAB VISIT (OUTPATIENT)
Dept: LAB | Facility: HOSPITAL | Age: 56
End: 2021-09-27
Attending: FAMILY MEDICINE
Payer: COMMERCIAL

## 2021-09-27 DIAGNOSIS — Z00.00 ROUTINE MEDICAL EXAM: ICD-10-CM

## 2021-09-27 DIAGNOSIS — F52.0 LACK OF LIBIDO: ICD-10-CM

## 2021-09-27 LAB
ALBUMIN SERPL BCP-MCNC: 3.9 G/DL (ref 3.5–5.2)
ALP SERPL-CCNC: 43 U/L (ref 55–135)
ALT SERPL W/O P-5'-P-CCNC: 57 U/L (ref 10–44)
ANION GAP SERPL CALC-SCNC: 11 MMOL/L (ref 8–16)
AST SERPL-CCNC: 54 U/L (ref 10–40)
BILIRUB SERPL-MCNC: 0.5 MG/DL (ref 0.1–1)
BUN SERPL-MCNC: 10 MG/DL (ref 6–20)
CALCIUM SERPL-MCNC: 9.3 MG/DL (ref 8.7–10.5)
CHLORIDE SERPL-SCNC: 105 MMOL/L (ref 95–110)
CHOLEST SERPL-MCNC: 172 MG/DL (ref 120–199)
CHOLEST/HDLC SERPL: 4.4 {RATIO} (ref 2–5)
CO2 SERPL-SCNC: 22 MMOL/L (ref 23–29)
COMPLEXED PSA SERPL-MCNC: 0.73 NG/ML (ref 0–4)
CREAT SERPL-MCNC: 0.9 MG/DL (ref 0.5–1.4)
ERYTHROCYTE [DISTWIDTH] IN BLOOD BY AUTOMATED COUNT: 15.1 % (ref 11.5–14.5)
EST. GFR  (AFRICAN AMERICAN): >60 ML/MIN/1.73 M^2
EST. GFR  (NON AFRICAN AMERICAN): >60 ML/MIN/1.73 M^2
ESTIMATED AVG GLUCOSE: 131 MG/DL (ref 68–131)
GLUCOSE SERPL-MCNC: 100 MG/DL (ref 70–110)
HBA1C MFR BLD: 6.2 % (ref 4–5.6)
HCT VFR BLD AUTO: 40.1 % (ref 40–54)
HDLC SERPL-MCNC: 39 MG/DL (ref 40–75)
HDLC SERPL: 22.7 % (ref 20–50)
HGB BLD-MCNC: 12.8 G/DL (ref 14–18)
LDLC SERPL CALC-MCNC: 114.8 MG/DL (ref 63–159)
MCH RBC QN AUTO: 23.6 PG (ref 27–31)
MCHC RBC AUTO-ENTMCNC: 31.9 G/DL (ref 32–36)
MCV RBC AUTO: 74 FL (ref 82–98)
NONHDLC SERPL-MCNC: 133 MG/DL
PLATELET # BLD AUTO: 333 K/UL (ref 150–450)
PMV BLD AUTO: 9.8 FL (ref 9.2–12.9)
POTASSIUM SERPL-SCNC: 3.9 MMOL/L (ref 3.5–5.1)
PROT SERPL-MCNC: 7.3 G/DL (ref 6–8.4)
RBC # BLD AUTO: 5.43 M/UL (ref 4.6–6.2)
SODIUM SERPL-SCNC: 138 MMOL/L (ref 136–145)
TRIGL SERPL-MCNC: 91 MG/DL (ref 30–150)
TSH SERPL DL<=0.005 MIU/L-ACNC: 1.36 UIU/ML (ref 0.4–4)
WBC # BLD AUTO: 5.44 K/UL (ref 3.9–12.7)

## 2021-09-27 PROCEDURE — 84443 ASSAY THYROID STIM HORMONE: CPT | Performed by: FAMILY MEDICINE

## 2021-09-27 PROCEDURE — 83036 HEMOGLOBIN GLYCOSYLATED A1C: CPT | Performed by: FAMILY MEDICINE

## 2021-09-27 PROCEDURE — 85027 COMPLETE CBC AUTOMATED: CPT | Performed by: FAMILY MEDICINE

## 2021-09-27 PROCEDURE — 36415 COLL VENOUS BLD VENIPUNCTURE: CPT | Mod: PN | Performed by: FAMILY MEDICINE

## 2021-09-27 PROCEDURE — 84403 ASSAY OF TOTAL TESTOSTERONE: CPT | Performed by: FAMILY MEDICINE

## 2021-09-27 PROCEDURE — 84153 ASSAY OF PSA TOTAL: CPT | Performed by: FAMILY MEDICINE

## 2021-09-27 PROCEDURE — 80061 LIPID PANEL: CPT | Performed by: FAMILY MEDICINE

## 2021-09-27 PROCEDURE — 80053 COMPREHEN METABOLIC PANEL: CPT | Performed by: FAMILY MEDICINE

## 2021-10-02 LAB
ALBUMIN SERPL-MCNC: 4.1 G/DL (ref 3.6–5.1)
SHBG SERPL-SCNC: 57 NMOL/L (ref 22–77)
TESTOST FREE SERPL-MCNC: 19.5 PG/ML (ref 46–224)
TESTOST SERPL-MCNC: 245 NG/DL (ref 250–1100)
TESTOSTERONE.FREE+WB SERPL-MCNC: 36.7 NG/DL (ref 110–575)

## 2021-10-11 ENCOUNTER — PATIENT MESSAGE (OUTPATIENT)
Dept: PRIMARY CARE CLINIC | Facility: CLINIC | Age: 56
End: 2021-10-11

## 2021-10-11 DIAGNOSIS — R79.89 ELEVATED LFTS: Primary | ICD-10-CM

## 2021-10-14 ENCOUNTER — LAB VISIT (OUTPATIENT)
Dept: LAB | Facility: HOSPITAL | Age: 56
End: 2021-10-14
Attending: FAMILY MEDICINE
Payer: COMMERCIAL

## 2021-10-14 DIAGNOSIS — R79.89 ELEVATED LFTS: ICD-10-CM

## 2021-10-14 LAB
ALBUMIN SERPL BCP-MCNC: 3.9 G/DL (ref 3.5–5.2)
ALP SERPL-CCNC: 43 U/L (ref 55–135)
ALT SERPL W/O P-5'-P-CCNC: 55 U/L (ref 10–44)
ANION GAP SERPL CALC-SCNC: 11 MMOL/L (ref 8–16)
AST SERPL-CCNC: 51 U/L (ref 10–40)
BILIRUB SERPL-MCNC: 0.5 MG/DL (ref 0.1–1)
BUN SERPL-MCNC: 10 MG/DL (ref 6–20)
CALCIUM SERPL-MCNC: 9.5 MG/DL (ref 8.7–10.5)
CHLORIDE SERPL-SCNC: 103 MMOL/L (ref 95–110)
CO2 SERPL-SCNC: 22 MMOL/L (ref 23–29)
CREAT SERPL-MCNC: 0.8 MG/DL (ref 0.5–1.4)
EST. GFR  (AFRICAN AMERICAN): >60 ML/MIN/1.73 M^2
EST. GFR  (NON AFRICAN AMERICAN): >60 ML/MIN/1.73 M^2
GLUCOSE SERPL-MCNC: 97 MG/DL (ref 70–110)
POTASSIUM SERPL-SCNC: 4.2 MMOL/L (ref 3.5–5.1)
PROT SERPL-MCNC: 7.2 G/DL (ref 6–8.4)
SODIUM SERPL-SCNC: 136 MMOL/L (ref 136–145)

## 2021-10-14 PROCEDURE — 80053 COMPREHEN METABOLIC PANEL: CPT | Performed by: FAMILY MEDICINE

## 2021-10-14 PROCEDURE — 36415 COLL VENOUS BLD VENIPUNCTURE: CPT | Mod: PN | Performed by: FAMILY MEDICINE

## 2021-10-14 PROCEDURE — 80074 ACUTE HEPATITIS PANEL: CPT | Performed by: FAMILY MEDICINE

## 2021-10-19 ENCOUNTER — HOSPITAL ENCOUNTER (OUTPATIENT)
Dept: RADIOLOGY | Facility: HOSPITAL | Age: 56
Discharge: HOME | End: 2021-10-19
Attending: FAMILY MEDICINE
Payer: COMMERCIAL

## 2021-10-19 ENCOUNTER — PATIENT OUTREACH (OUTPATIENT)
Dept: ADMINISTRATIVE | Facility: OTHER | Age: 56
End: 2021-10-19

## 2021-10-19 DIAGNOSIS — R79.89 ELEVATED LFTS: ICD-10-CM

## 2021-10-19 PROCEDURE — 76705 ECHO EXAM OF ABDOMEN: CPT | Mod: 26,,, | Performed by: STUDENT IN AN ORGANIZED HEALTH CARE EDUCATION/TRAINING PROGRAM

## 2021-10-19 PROCEDURE — 76705 ECHO EXAM OF ABDOMEN: CPT | Mod: TC

## 2021-10-19 PROCEDURE — 76705 US ABDOMEN LIMITED: ICD-10-PCS | Mod: 26,,, | Performed by: STUDENT IN AN ORGANIZED HEALTH CARE EDUCATION/TRAINING PROGRAM

## 2021-10-20 ENCOUNTER — OFFICE VISIT (OUTPATIENT)
Dept: SPORTS MEDICINE | Facility: CLINIC | Age: 56
End: 2021-10-20
Payer: COMMERCIAL

## 2021-10-20 ENCOUNTER — HOSPITAL ENCOUNTER (OUTPATIENT)
Dept: RADIOLOGY | Facility: HOSPITAL | Age: 56
Discharge: HOME | End: 2021-10-20
Attending: ORTHOPAEDIC SURGERY
Payer: COMMERCIAL

## 2021-10-20 VITALS
HEIGHT: 70 IN | BODY MASS INDEX: 45.1 KG/M2 | HEART RATE: 75 BPM | DIASTOLIC BLOOD PRESSURE: 83 MMHG | WEIGHT: 315 LBS | SYSTOLIC BLOOD PRESSURE: 133 MMHG

## 2021-10-20 DIAGNOSIS — M25.562 CHRONIC PAIN OF LEFT KNEE: Primary | ICD-10-CM

## 2021-10-20 DIAGNOSIS — M25.561 PAIN IN BOTH KNEES, UNSPECIFIED CHRONICITY: ICD-10-CM

## 2021-10-20 DIAGNOSIS — G89.29 CHRONIC PAIN OF LEFT KNEE: Primary | ICD-10-CM

## 2021-10-20 DIAGNOSIS — M25.562 PAIN IN BOTH KNEES, UNSPECIFIED CHRONICITY: ICD-10-CM

## 2021-10-20 PROCEDURE — 3044F HG A1C LEVEL LT 7.0%: CPT | Mod: CPTII,S$GLB,, | Performed by: ORTHOPAEDIC SURGERY

## 2021-10-20 PROCEDURE — 3075F PR MOST RECENT SYSTOLIC BLOOD PRESS GE 130-139MM HG: ICD-10-PCS | Mod: CPTII,S$GLB,, | Performed by: ORTHOPAEDIC SURGERY

## 2021-10-20 PROCEDURE — 99203 PR OFFICE/OUTPT VISIT, NEW, LEVL III, 30-44 MIN: ICD-10-PCS | Mod: S$GLB,,, | Performed by: ORTHOPAEDIC SURGERY

## 2021-10-20 PROCEDURE — 99203 OFFICE O/P NEW LOW 30 MIN: CPT | Mod: S$GLB,,, | Performed by: ORTHOPAEDIC SURGERY

## 2021-10-20 PROCEDURE — 3044F PR MOST RECENT HEMOGLOBIN A1C LEVEL <7.0%: ICD-10-PCS | Mod: CPTII,S$GLB,, | Performed by: ORTHOPAEDIC SURGERY

## 2021-10-20 PROCEDURE — 1159F MED LIST DOCD IN RCRD: CPT | Mod: CPTII,S$GLB,, | Performed by: ORTHOPAEDIC SURGERY

## 2021-10-20 PROCEDURE — 99999 PR PBB SHADOW E&M-EST. PATIENT-LVL III: CPT | Mod: PBBFAC,,, | Performed by: ORTHOPAEDIC SURGERY

## 2021-10-20 PROCEDURE — 3079F DIAST BP 80-89 MM HG: CPT | Mod: CPTII,S$GLB,, | Performed by: ORTHOPAEDIC SURGERY

## 2021-10-20 PROCEDURE — 3008F PR BODY MASS INDEX (BMI) DOCUMENTED: ICD-10-PCS | Mod: CPTII,S$GLB,, | Performed by: ORTHOPAEDIC SURGERY

## 2021-10-20 PROCEDURE — 73564 X-RAY EXAM KNEE 4 OR MORE: CPT | Mod: 26,50,, | Performed by: RADIOLOGY

## 2021-10-20 PROCEDURE — 3075F SYST BP GE 130 - 139MM HG: CPT | Mod: CPTII,S$GLB,, | Performed by: ORTHOPAEDIC SURGERY

## 2021-10-20 PROCEDURE — 3008F BODY MASS INDEX DOCD: CPT | Mod: CPTII,S$GLB,, | Performed by: ORTHOPAEDIC SURGERY

## 2021-10-20 PROCEDURE — 73564 X-RAY EXAM KNEE 4 OR MORE: CPT | Mod: TC,50

## 2021-10-20 PROCEDURE — 1159F PR MEDICATION LIST DOCUMENTED IN MEDICAL RECORD: ICD-10-PCS | Mod: CPTII,S$GLB,, | Performed by: ORTHOPAEDIC SURGERY

## 2021-10-20 PROCEDURE — 73564 XR KNEE ORTHO BILAT WITH FLEXION: ICD-10-PCS | Mod: 26,50,, | Performed by: RADIOLOGY

## 2021-10-20 PROCEDURE — 99999 PR PBB SHADOW E&M-EST. PATIENT-LVL III: ICD-10-PCS | Mod: PBBFAC,,, | Performed by: ORTHOPAEDIC SURGERY

## 2021-10-20 PROCEDURE — 3079F PR MOST RECENT DIASTOLIC BLOOD PRESSURE 80-89 MM HG: ICD-10-PCS | Mod: CPTII,S$GLB,, | Performed by: ORTHOPAEDIC SURGERY

## 2021-10-27 ENCOUNTER — TELEPHONE (OUTPATIENT)
Dept: SPORTS MEDICINE | Facility: CLINIC | Age: 56
End: 2021-10-27
Payer: COMMERCIAL

## 2021-11-08 ENCOUNTER — HOSPITAL ENCOUNTER (OUTPATIENT)
Dept: RADIOLOGY | Facility: HOSPITAL | Age: 56
Discharge: HOME | End: 2021-11-08
Attending: ORTHOPAEDIC SURGERY
Payer: COMMERCIAL

## 2021-11-08 DIAGNOSIS — G89.29 CHRONIC PAIN OF LEFT KNEE: ICD-10-CM

## 2021-11-08 DIAGNOSIS — M25.562 CHRONIC PAIN OF LEFT KNEE: ICD-10-CM

## 2021-11-08 PROCEDURE — 73721 MRI JNT OF LWR EXTRE W/O DYE: CPT | Mod: TC,LT

## 2021-11-08 PROCEDURE — 73721 MRI KNEE WITHOUT CONTRAST LEFT: ICD-10-PCS | Mod: 26,LT,, | Performed by: RADIOLOGY

## 2021-11-08 PROCEDURE — 73721 MRI JNT OF LWR EXTRE W/O DYE: CPT | Mod: 26,LT,, | Performed by: RADIOLOGY

## 2021-11-12 ENCOUNTER — TELEPHONE (OUTPATIENT)
Dept: ORTHOPEDICS | Facility: HOSPITAL | Age: 56
End: 2021-11-12
Payer: COMMERCIAL

## 2021-11-18 ENCOUNTER — PATIENT MESSAGE (OUTPATIENT)
Dept: SPORTS MEDICINE | Facility: CLINIC | Age: 56
End: 2021-11-18
Payer: COMMERCIAL

## 2021-11-22 DIAGNOSIS — S83.242A ACUTE MEDIAL MENISCUS TEAR OF LEFT KNEE, INITIAL ENCOUNTER: Primary | ICD-10-CM

## 2021-11-22 DIAGNOSIS — M67.50 PLICA SYNDROME: ICD-10-CM

## 2021-11-22 DIAGNOSIS — M94.262 CHONDROMALACIA OF LEFT KNEE: ICD-10-CM

## 2021-12-14 ENCOUNTER — PATIENT MESSAGE (OUTPATIENT)
Dept: SPORTS MEDICINE | Facility: CLINIC | Age: 56
End: 2021-12-14
Payer: COMMERCIAL

## 2021-12-16 ENCOUNTER — PATIENT MESSAGE (OUTPATIENT)
Dept: PREADMISSION TESTING | Facility: HOSPITAL | Age: 56
End: 2021-12-16
Payer: COMMERCIAL

## 2021-12-16 ENCOUNTER — TELEPHONE (OUTPATIENT)
Dept: SPORTS MEDICINE | Facility: CLINIC | Age: 56
End: 2021-12-16
Payer: COMMERCIAL

## 2021-12-16 NOTE — ANESTHESIA PAT ROS NOTE
12/16/2021  Topher Cr is a 56 y.o., male.    Pre-op Assessment          Review of Systems  Anesthesia Hx:  No problems with previous Anesthesia    Social:  No Alcohol Use, Non-Smoker       Anesthesia Assessment: Preoperative EQUATION    Planned Procedure: Procedure(s) (LRB):  ARTHROSCOPY, KNEE, WITH MENISCECTOMY (Left)  CHONDROPLASTY, KNEE (Left)  SYNOVECTOMY, KNEE (Left)  Requested Anesthesia Type:General  Surgeon: Idalia Mclean MD  Service: Orthopedics  Known or anticipated Date of Surgery:1/4/2022    Previous anesthesia records:MAC and No problems    Last PCP note: 3-6 months ago , [facility]   Other important co-morbidities: Morbid Obesity      PCP clearance pending    LABS 10/2021-CMP WNL except slightly abn LFT, being followed by PCP   A1C-6.2  TSH normal  CBC WNL    PET SCAN 2014  CONCLUSIONS: NORMAL MYOCARDIAL PERFUSION PET STRESS TEST   1. The perfusion scan is free of evidence for myocardial ischemia or injury.   2. Resting wall motion is physiologic. Stress wall motion is physiologic.   3. Resting LV function is normal.  (normal is >= 51%)Visually estimated LV function is normal at stress.   4. The ventricular volumes are normal at rest and stress.   5. The extracardiac distribution of radioactivity is normal.   6. There was no previous study available to compare.     5'10  001#  vaccinated

## 2021-12-20 ENCOUNTER — PATIENT MESSAGE (OUTPATIENT)
Dept: PREADMISSION TESTING | Facility: HOSPITAL | Age: 56
End: 2021-12-20
Payer: COMMERCIAL

## 2021-12-21 ENCOUNTER — PATIENT MESSAGE (OUTPATIENT)
Dept: PRIMARY CARE CLINIC | Facility: CLINIC | Age: 56
End: 2021-12-21
Payer: COMMERCIAL

## 2021-12-21 ENCOUNTER — PATIENT MESSAGE (OUTPATIENT)
Dept: SURGERY | Facility: HOSPITAL | Age: 56
End: 2021-12-21
Payer: COMMERCIAL

## 2021-12-27 ENCOUNTER — OFFICE VISIT (OUTPATIENT)
Dept: PRIMARY CARE CLINIC | Facility: CLINIC | Age: 56
End: 2021-12-27
Payer: COMMERCIAL

## 2021-12-27 ENCOUNTER — LAB VISIT (OUTPATIENT)
Dept: PRIMARY CARE CLINIC | Facility: CLINIC | Age: 56
End: 2021-12-27
Payer: COMMERCIAL

## 2021-12-27 VITALS
SYSTOLIC BLOOD PRESSURE: 130 MMHG | TEMPERATURE: 98 F | DIASTOLIC BLOOD PRESSURE: 82 MMHG | OXYGEN SATURATION: 97 % | HEART RATE: 75 BPM | BODY MASS INDEX: 45.1 KG/M2 | WEIGHT: 315 LBS | HEIGHT: 70 IN

## 2021-12-27 DIAGNOSIS — Z01.818 PRE-OP EXAM: Primary | ICD-10-CM

## 2021-12-27 DIAGNOSIS — Z01.818 PRE-OP EXAM: ICD-10-CM

## 2021-12-27 DIAGNOSIS — M23.204 OLD TEAR OF MEDIAL MENISCUS OF LEFT KNEE, UNSPECIFIED TEAR TYPE: ICD-10-CM

## 2021-12-27 DIAGNOSIS — R79.89 ELEVATED LFTS: ICD-10-CM

## 2021-12-27 DIAGNOSIS — E66.01 CLASS 3 SEVERE OBESITY WITH BODY MASS INDEX (BMI) OF 45.0 TO 49.9 IN ADULT, UNSPECIFIED OBESITY TYPE, UNSPECIFIED WHETHER SERIOUS COMORBIDITY PRESENT: ICD-10-CM

## 2021-12-27 LAB
ALBUMIN SERPL BCP-MCNC: 4.1 G/DL (ref 3.5–5.2)
ALP SERPL-CCNC: 46 U/L (ref 55–135)
ALT SERPL W/O P-5'-P-CCNC: 52 U/L (ref 10–44)
ANION GAP SERPL CALC-SCNC: 6 MMOL/L (ref 8–16)
AST SERPL-CCNC: 54 U/L (ref 10–40)
BASOPHILS # BLD AUTO: 0.07 K/UL (ref 0–0.2)
BASOPHILS NFR BLD: 1.2 % (ref 0–1.9)
BILIRUB SERPL-MCNC: 0.6 MG/DL (ref 0.1–1)
BUN SERPL-MCNC: 12 MG/DL (ref 6–20)
CALCIUM SERPL-MCNC: 9.1 MG/DL (ref 8.7–10.5)
CHLORIDE SERPL-SCNC: 104 MMOL/L (ref 95–110)
CO2 SERPL-SCNC: 28 MMOL/L (ref 23–29)
CREAT SERPL-MCNC: 0.8 MG/DL (ref 0.5–1.4)
DIFFERENTIAL METHOD: ABNORMAL
EOSINOPHIL # BLD AUTO: 0.1 K/UL (ref 0–0.5)
EOSINOPHIL NFR BLD: 2.5 % (ref 0–8)
ERYTHROCYTE [DISTWIDTH] IN BLOOD BY AUTOMATED COUNT: 14.8 % (ref 11.5–14.5)
EST. GFR  (AFRICAN AMERICAN): >60 ML/MIN/1.73 M^2
EST. GFR  (NON AFRICAN AMERICAN): >60 ML/MIN/1.73 M^2
ESTIMATED AVG GLUCOSE: 134 MG/DL (ref 68–131)
GLUCOSE SERPL-MCNC: 105 MG/DL (ref 70–110)
HBA1C MFR BLD: 6.3 % (ref 4–5.6)
HCT VFR BLD AUTO: 41 % (ref 40–54)
HGB BLD-MCNC: 12.7 G/DL (ref 14–18)
IMM GRANULOCYTES # BLD AUTO: 0.06 K/UL (ref 0–0.04)
IMM GRANULOCYTES NFR BLD AUTO: 1.1 % (ref 0–0.5)
INR PPP: 1 (ref 0.8–1.2)
LYMPHOCYTES # BLD AUTO: 2.5 K/UL (ref 1–4.8)
LYMPHOCYTES NFR BLD: 43.4 % (ref 18–48)
MCH RBC QN AUTO: 23.3 PG (ref 27–31)
MCHC RBC AUTO-ENTMCNC: 31 G/DL (ref 32–36)
MCV RBC AUTO: 75 FL (ref 82–98)
MONOCYTES # BLD AUTO: 0.6 K/UL (ref 0.3–1)
MONOCYTES NFR BLD: 10.8 % (ref 4–15)
NEUTROPHILS # BLD AUTO: 2.3 K/UL (ref 1.8–7.7)
NEUTROPHILS NFR BLD: 41 % (ref 38–73)
NRBC BLD-RTO: 0 /100 WBC
PLATELET # BLD AUTO: 345 K/UL (ref 150–450)
PMV BLD AUTO: 10.5 FL (ref 9.2–12.9)
POTASSIUM SERPL-SCNC: 4.3 MMOL/L (ref 3.5–5.1)
PROT SERPL-MCNC: 7.4 G/DL (ref 6–8.4)
PROTHROMBIN TIME: 11 SEC (ref 9–12.5)
RBC # BLD AUTO: 5.44 M/UL (ref 4.6–6.2)
SODIUM SERPL-SCNC: 138 MMOL/L (ref 136–145)
WBC # BLD AUTO: 5.67 K/UL (ref 3.9–12.7)

## 2021-12-27 PROCEDURE — 1159F PR MEDICATION LIST DOCUMENTED IN MEDICAL RECORD: ICD-10-PCS | Mod: CPTII,S$GLB,, | Performed by: NURSE PRACTITIONER

## 2021-12-27 PROCEDURE — 99999 PR PBB SHADOW E&M-EST. PATIENT-LVL IV: ICD-10-PCS | Mod: PBBFAC,,, | Performed by: NURSE PRACTITIONER

## 2021-12-27 PROCEDURE — 85025 COMPLETE CBC W/AUTO DIFF WBC: CPT | Performed by: NURSE PRACTITIONER

## 2021-12-27 PROCEDURE — 3008F PR BODY MASS INDEX (BMI) DOCUMENTED: ICD-10-PCS | Mod: CPTII,S$GLB,, | Performed by: NURSE PRACTITIONER

## 2021-12-27 PROCEDURE — 3008F BODY MASS INDEX DOCD: CPT | Mod: CPTII,S$GLB,, | Performed by: NURSE PRACTITIONER

## 2021-12-27 PROCEDURE — 99213 OFFICE O/P EST LOW 20 MIN: CPT | Mod: S$GLB,,, | Performed by: NURSE PRACTITIONER

## 2021-12-27 PROCEDURE — 3079F PR MOST RECENT DIASTOLIC BLOOD PRESSURE 80-89 MM HG: ICD-10-PCS | Mod: CPTII,S$GLB,, | Performed by: NURSE PRACTITIONER

## 2021-12-27 PROCEDURE — 3044F PR MOST RECENT HEMOGLOBIN A1C LEVEL <7.0%: ICD-10-PCS | Mod: CPTII,S$GLB,, | Performed by: NURSE PRACTITIONER

## 2021-12-27 PROCEDURE — 83036 HEMOGLOBIN GLYCOSYLATED A1C: CPT | Performed by: NURSE PRACTITIONER

## 2021-12-27 PROCEDURE — 3079F DIAST BP 80-89 MM HG: CPT | Mod: CPTII,S$GLB,, | Performed by: NURSE PRACTITIONER

## 2021-12-27 PROCEDURE — 3075F SYST BP GE 130 - 139MM HG: CPT | Mod: CPTII,S$GLB,, | Performed by: NURSE PRACTITIONER

## 2021-12-27 PROCEDURE — 85610 PROTHROMBIN TIME: CPT | Performed by: NURSE PRACTITIONER

## 2021-12-27 PROCEDURE — 3044F HG A1C LEVEL LT 7.0%: CPT | Mod: CPTII,S$GLB,, | Performed by: NURSE PRACTITIONER

## 2021-12-27 PROCEDURE — 99999 PR PBB SHADOW E&M-EST. PATIENT-LVL IV: CPT | Mod: PBBFAC,,, | Performed by: NURSE PRACTITIONER

## 2021-12-27 PROCEDURE — 80053 COMPREHEN METABOLIC PANEL: CPT | Performed by: NURSE PRACTITIONER

## 2021-12-27 PROCEDURE — 3075F PR MOST RECENT SYSTOLIC BLOOD PRESS GE 130-139MM HG: ICD-10-PCS | Mod: CPTII,S$GLB,, | Performed by: NURSE PRACTITIONER

## 2021-12-27 PROCEDURE — 1159F MED LIST DOCD IN RCRD: CPT | Mod: CPTII,S$GLB,, | Performed by: NURSE PRACTITIONER

## 2021-12-27 PROCEDURE — 99213 PR OFFICE/OUTPT VISIT, EST, LEVL III, 20-29 MIN: ICD-10-PCS | Mod: S$GLB,,, | Performed by: NURSE PRACTITIONER

## 2021-12-28 ENCOUNTER — HOSPITAL ENCOUNTER (OUTPATIENT)
Dept: CARDIOLOGY | Facility: CLINIC | Age: 56
Discharge: HOME | End: 2021-12-28
Payer: COMMERCIAL

## 2021-12-28 DIAGNOSIS — Z01.818 PRE-OP EXAM: ICD-10-CM

## 2021-12-28 PROCEDURE — 93005 EKG 12-LEAD: ICD-10-PCS | Mod: S$GLB,,, | Performed by: NURSE PRACTITIONER

## 2021-12-28 PROCEDURE — 93010 EKG 12-LEAD: ICD-10-PCS | Mod: S$GLB,,, | Performed by: INTERNAL MEDICINE

## 2021-12-28 PROCEDURE — 93010 ELECTROCARDIOGRAM REPORT: CPT | Mod: S$GLB,,, | Performed by: INTERNAL MEDICINE

## 2021-12-28 PROCEDURE — 93005 ELECTROCARDIOGRAM TRACING: CPT | Mod: S$GLB,,, | Performed by: NURSE PRACTITIONER

## 2021-12-29 DIAGNOSIS — D64.9 ANEMIA, UNSPECIFIED TYPE: Primary | ICD-10-CM

## 2021-12-29 DIAGNOSIS — Z01.818 PRE-OP TESTING: Primary | ICD-10-CM

## 2021-12-29 RX ORDER — FERROUS GLUCONATE 324(37.5)
324 TABLET ORAL
Qty: 30 TABLET | Refills: 2 | Status: SHIPPED | OUTPATIENT
Start: 2021-12-29 | End: 2022-12-22

## 2022-01-03 ENCOUNTER — OFFICE VISIT (OUTPATIENT)
Dept: SPORTS MEDICINE | Facility: CLINIC | Age: 57
End: 2022-01-03
Payer: COMMERCIAL

## 2022-01-03 VITALS
SYSTOLIC BLOOD PRESSURE: 134 MMHG | HEIGHT: 70 IN | WEIGHT: 315 LBS | HEART RATE: 72 BPM | BODY MASS INDEX: 45.1 KG/M2 | DIASTOLIC BLOOD PRESSURE: 85 MMHG

## 2022-01-03 DIAGNOSIS — S83.242A ACUTE MEDIAL MENISCUS TEAR OF LEFT KNEE, INITIAL ENCOUNTER: Primary | ICD-10-CM

## 2022-01-03 DIAGNOSIS — Z79.899 DVT PROPHYLAXIS: ICD-10-CM

## 2022-01-03 DIAGNOSIS — S83.242A ACUTE MEDIAL MENISCUS TEAR OF LEFT KNEE: ICD-10-CM

## 2022-01-03 LAB
CTP QC/QA: YES
SARS-COV-2 RDRP RESP QL NAA+PROBE: NEGATIVE

## 2022-01-03 PROCEDURE — 99499 UNLISTED E&M SERVICE: CPT | Mod: S$GLB,,, | Performed by: ORTHOPAEDIC SURGERY

## 2022-01-03 PROCEDURE — 3079F PR MOST RECENT DIASTOLIC BLOOD PRESSURE 80-89 MM HG: ICD-10-PCS | Mod: CPTII,S$GLB,, | Performed by: ORTHOPAEDIC SURGERY

## 2022-01-03 PROCEDURE — 3008F PR BODY MASS INDEX (BMI) DOCUMENTED: ICD-10-PCS | Mod: CPTII,S$GLB,, | Performed by: ORTHOPAEDIC SURGERY

## 2022-01-03 PROCEDURE — 99999 PR PBB SHADOW E&M-EST. PATIENT-LVL III: CPT | Mod: PBBFAC,,, | Performed by: ORTHOPAEDIC SURGERY

## 2022-01-03 PROCEDURE — 3008F BODY MASS INDEX DOCD: CPT | Mod: CPTII,S$GLB,, | Performed by: ORTHOPAEDIC SURGERY

## 2022-01-03 PROCEDURE — 3075F SYST BP GE 130 - 139MM HG: CPT | Mod: CPTII,S$GLB,, | Performed by: ORTHOPAEDIC SURGERY

## 2022-01-03 PROCEDURE — 3075F PR MOST RECENT SYSTOLIC BLOOD PRESS GE 130-139MM HG: ICD-10-PCS | Mod: CPTII,S$GLB,, | Performed by: ORTHOPAEDIC SURGERY

## 2022-01-03 PROCEDURE — 1159F PR MEDICATION LIST DOCUMENTED IN MEDICAL RECORD: ICD-10-PCS | Mod: CPTII,S$GLB,, | Performed by: ORTHOPAEDIC SURGERY

## 2022-01-03 PROCEDURE — 99499 NO LOS: ICD-10-PCS | Mod: S$GLB,,, | Performed by: ORTHOPAEDIC SURGERY

## 2022-01-03 PROCEDURE — 3079F DIAST BP 80-89 MM HG: CPT | Mod: CPTII,S$GLB,, | Performed by: ORTHOPAEDIC SURGERY

## 2022-01-03 PROCEDURE — 1160F PR REVIEW ALL MEDS BY PRESCRIBER/CLIN PHARMACIST DOCUMENTED: ICD-10-PCS | Mod: CPTII,S$GLB,, | Performed by: ORTHOPAEDIC SURGERY

## 2022-01-03 PROCEDURE — 99999 PR PBB SHADOW E&M-EST. PATIENT-LVL III: ICD-10-PCS | Mod: PBBFAC,,, | Performed by: ORTHOPAEDIC SURGERY

## 2022-01-03 PROCEDURE — 1159F MED LIST DOCD IN RCRD: CPT | Mod: CPTII,S$GLB,, | Performed by: ORTHOPAEDIC SURGERY

## 2022-01-03 PROCEDURE — 1160F RVW MEDS BY RX/DR IN RCRD: CPT | Mod: CPTII,S$GLB,, | Performed by: ORTHOPAEDIC SURGERY

## 2022-01-03 RX ORDER — TRAMADOL HYDROCHLORIDE 50 MG/1
50-100 TABLET ORAL EVERY 6 HOURS PRN
Qty: 21 TABLET | Refills: 0 | Status: SHIPPED | OUTPATIENT
Start: 2022-01-03 | End: 2022-12-22

## 2022-01-03 RX ORDER — SODIUM CHLORIDE 9 MG/ML
INJECTION, SOLUTION INTRAVENOUS CONTINUOUS
Status: CANCELLED | OUTPATIENT
Start: 2022-01-03

## 2022-01-03 RX ORDER — HYDROCODONE BITARTRATE AND ACETAMINOPHEN 10; 325 MG/1; MG/1
TABLET ORAL
Qty: 15 TABLET | Refills: 0 | Status: SHIPPED | OUTPATIENT
Start: 2022-01-03 | End: 2022-12-22

## 2022-01-03 RX ORDER — ENOXAPARIN SODIUM 100 MG/ML
40 INJECTION SUBCUTANEOUS DAILY
Qty: 28 EACH | Refills: 0 | Status: SHIPPED | OUTPATIENT
Start: 2022-01-03 | End: 2022-12-22

## 2022-01-03 RX ORDER — PROMETHAZINE HYDROCHLORIDE 25 MG/1
25 TABLET ORAL EVERY 6 HOURS PRN
Qty: 12 TABLET | Refills: 0 | Status: SHIPPED | OUTPATIENT
Start: 2022-01-03 | End: 2022-12-22

## 2022-01-03 NOTE — H&P (VIEW-ONLY)
Topher Cr  is here for a completion of his perioperative paperwork. he  Is scheduled to undergo     left              a. Knee arthroscopic medial meniscectomy                b. Knee arthroscopic possible plica excision              c. Knee arthroscopic possible chondroplasty on 1/4/22.      He is a healthy individual but does need clearance for this procedure which he has received from [facility].     PAST MEDICAL HISTORY:   Past Medical History:   Diagnosis Date    Allergy     Blood clotting tendency     DJD (degenerative joint disease)     Hyperlipidemia     Low back strain, initial encounter 5/16/2019    Morbid obesity with BMI of 40.0-44.9, adult 3/29/2018    Obesity     Urticaria      PAST SURGICAL HISTORY:   Past Surgical History:   Procedure Laterality Date    ARTHROSCOPY OF KNEE Right     COLONOSCOPY N/A 2/15/2019    Procedure: COLONOSCOPY;  Surgeon: Boris Carey MD;  Location: [facility];  Service: Endoscopy;  Laterality: N/A;    ESOPHAGOGASTRODUODENOSCOPY N/A 2/15/2019    Procedure: ESOPHAGOGASTRODUODENOSCOPY (EGD);  Surgeon: Boris Carey MD;  Location: [facility];  Service: Endoscopy;  Laterality: N/A;    KNEE SURGERY Right      FAMILY HISTORY:   Family History   Problem Relation Age of Onset    Asthma Mother     Heart disease Father     Asthma Brother     Colon cancer Neg Hx     Esophageal cancer Neg Hx     Rectal cancer Neg Hx     Stomach cancer Neg Hx     Ulcerative colitis Neg Hx     Irritable bowel syndrome Neg Hx     Crohn's disease Neg Hx     Celiac disease Neg Hx     Melanoma Neg Hx      SOCIAL HISTORY:   Social History     Socioeconomic History    Marital status:    Tobacco Use    Smoking status: Never Smoker    Smokeless tobacco: Never Used   Substance and Sexual Activity    Alcohol use: No     Alcohol/week: 0.0 standard drinks    Drug use: No   Social History Narrative    Works in [employer], nonsmoker       MEDICATIONS:   Current Outpatient  "Medications:     enoxaparin (LOVENOX) 40 mg/0.4 mL Syrg, Inject 0.4 mLs (40 mg total) into the skin once daily. For DVT prophylaxis. Start use on morning after surgery., Disp: 28 each, Rfl: 0    ferrous gluconate 324 mg (37.5 mg iron) Tab tablet, Take 1 tablet (324 mg total) by mouth daily with breakfast. (Patient not taking: Reported on 1/3/2022), Disp: 30 tablet, Rfl: 2    HYDROcodone-acetaminophen (NORCO)  mg per tablet, Take 1 tablet by mouth every 4-6 hours for pain., Disp: 15 tablet, Rfl: 0    ketoconazole (NIZORAL) 2 % shampoo, Wash face (especially around nose and beard area), ears with medicated shampoo at least 2-3x/week - let sit at least 5 minutes prior to rinsing (Patient not taking: Reported on 1/3/2022), Disp: 120 mL, Rfl: 5    promethazine (PHENERGAN) 25 MG tablet, Take 1 tablet (25 mg total) by mouth every 6 (six) hours as needed for Nausea., Disp: 12 tablet, Rfl: 0    traMADoL (ULTRAM) 50 mg tablet, Take 1-2 tablets ( mg total) by mouth every 6 (six) hours as needed for Pain., Disp: 21 tablet, Rfl: 0    triamcinolone acetonide 0.1% (KENALOG) 0.1 % cream, Apply topically 2 (two) times daily as needed (rash on neck). Avoid use on face, body folds, groin/genitalia. (Patient not taking: Reported on 1/3/2022), Disp: 45 g, Rfl: 3  ALLERGIES:   Review of patient's allergies indicates:   Allergen Reactions    Cucumber fruit extract Hives and Itching       VITAL SIGNS: /85   Pulse 72   Ht 5' 10" (1.778 m)   Wt (!) 150.6 kg (332 lb)   BMI 47.64 kg/m²      Risks, indications and benefits of the surgical procedure were discussed with the patient. All questions with regard to surgery, rehab, expected return to functional activities, activities of daily living and recreational endeavors were answered to his satisfaction.    It was explained to the patient that there may be an increase in surgical risks if the patient has certain co-morbidities such as but not limited to: Obesity, " Cardiovascular issues (CHF, CAD, Arrhythmias), chronic pulmonary issues, previous or current neurovascular/neurological issues, previous strokes, diabetes mellitus, previous wound healing issues, previous wound or skin infections, PVD, clotting disorders, if the patient uses chronic steroids, if the patient takes or has immune compromising medications or diseases, or has previously or currently used tobacco products.     The patient verbalized that he/she does not have any additional clotting, bleeding, or blood disorders, other than what is list in her chart on today's review.     Then a brief history and physical exam were performed.    Review of Systems   Constitution: Negative. Negative for chills, fever and night sweats.   HENT: Negative for congestion and headaches.    Eyes: Negative for blurred vision, left vision loss and right vision loss.   Cardiovascular: Negative for chest pain and syncope.   Respiratory: Negative for cough and shortness of breath.    Endocrine: Negative for polydipsia, polyphagia and polyuria.   Hematologic/Lymphatic: Negative for bleeding problem. Does not bruise/bleed easily.   Skin: Negative for dry skin, itching and rash.   Musculoskeletal: Negative for falls and muscle weakness.   Gastrointestinal: Negative for abdominal pain and bowel incontinence.   Genitourinary: Negative for bladder incontinence and nocturia.   Neurological: Negative for disturbances in coordination, loss of balance and seizures.   Psychiatric/Behavioral: Negative for depression. The patient does not have insomnia.    Allergic/Immunologic: Negative for hives and persistent infections.     PHYSICAL EXAM:  GEN: A&Ox3, WD WN NAD  HEENT: WNL  CHEST: CTAB, no W/R/R  HEART: RRR, no M/R/G  ABD: Soft, NT ND, BS x4 QUADS  MS; See Epic  NEURO: CN II-XII intact       The surgical consent was then reviewed with the patient, who agreed with all the contents of the consent form and it was signed. he was then given the  surgery packet to bring with him to surgery center for the anesthesia portion of his perioperative paperwork (if needed)   For all physicians except for Dr. Quintanilla, we will email and possibly fax the consent forms and booking sheets to [facility].    The patient was given the opportunity to ask questions about the surgical plan and consent form, and once no other questions were asked, I proceeded with the pre-op appointment.    PHYSICAL THERAPY:  He was also instructed regarding physical therapy and will begin on  POD1. He was given a copy of the original prescription to schedule. Another copy of this prescription was also faxed to [facility].    POST OP CARE:instructions were reviewed including care of the wound and dressing after surgery and when he can shower. Patient was told not sleep or lay on there surgical extremity following surgery as this could cause repair damage, tissue damage, or nerve injury.    An extensive amount of time was spent on discussion of the following information based on what type of surgery the patient was having. Patient expressed understanding of the material below:    Shoulder surgery or upper extremity surgery requiring post-op sling:  It was explained to the patient that they should remove their arm from the sling approximately 6 times per day to do full elbow ROM (flexion and extension) and full supination and pronation of the elbow for approximately 5 minutes at a time to help prevent elbow stiffness, nerve pain or problems, or nerve injury. They were told to contact us if they begin having numbness and tingling of there surgical extremity that persists longer then 1 day without relief.     Extremity surgery requiring a splint:   It was explained to patient on how to properly elevated position there extremity to prevent pressure ulcers from occurring. I made sure that the patient understood that that surgical site may be numb following surgery and prevent them from  feeling pressure pain that they would normal feel if a pressure injury was occurring. Pressure ulcers and there causes were discussed with the patient today.     Post-operative splint:  It was explain to the patient that they can contact us at anytime if they feel that there is a problem with their splint or under their splint that needs evaluation. If there is concern, questions, or discomfort with the splint then they can present to either our clinic or the [facility] ED for removal, evaluation, and replacement of the splint.    CRUTCHES OR WALKER: It was explained to the patient that if they are having a lower extremity surgery that they will require either a walker or crutches to ambulate safely with after surgery. It was explained that a cane or other assistive devices are not sufficient to safely ambulate with after surgery. I explained to the patient that I will place an order for them to receive either crutches or a walker after surgery to go home with. It was explained that if they have crutches or a walker at home already, that they are REQUIRED to bring them to the hospital on the day of surgery. It was explained that if they do not have them at the hospital on the day of surgery that they WILL be provided a new pair or crutches or a walker to go home with to ensure ambulation will be safe if the patient needs to stop somewhere on the way home.      PAIN MANAGEMENT: Topher Cr was also given a pain management regime, which includes the TENS unit given to him by anton[facility] along with the education required for its use. He was also instructed regarding the Polar ice unit that will be in place after surgery and his postoperative pain medications.     PAIN MEDICATION:  Norco 10/325mg 1 po q 4-6 hours prn pain  Ultram 50 mg Take 1-2 p.o. q.6 hours p.r.n. breakthrough pain,   Phenergan 25 mg one p.o. q.6 hours p.r.n. nausea and vomiting.    DVT prophylaxis was discussed with the patient today including  risk factors for developing DVTs and history of DVTs. The patient was asked if any specific recommendations were given from the doctor/s that did pre-operative surgical clearance. The patient was then given an education sheet about DVTs and PE with warning signs and symptoms of both and steps to take if they suspect either of these.    This along with the Modified Caprini risk assessment model for VTE in general surgical patients was used to determine the patient's DVT risk.     From: Isabella MK, Tre DA, Hortensia SM, et al. Prevention of VTE in nonorthopedic surgical patients: antithrombotic therapy and prevention of thrombosis, 9th ed: American College of [employer]icians evidence-based clinical practical guidelines. Chest 2012; 141:e227S. Copyright © 2012. Reproduced with permission from the American College of Chest Physicians.    The below listed DVT prophylaxis regimen along with bilateral VITA compression stockings will be used post-op. Length of treatment has been determined to be 10-42 days post-op by the above noted Caprini assessment model.     History of lower extremity DVT.     Will use:  Lovenox 40mg, Inject once daily subcutaneously for 28 days after surgery.     Patient denies history of seizures.     The patient was told that narcotic pain medications may make them drowsy and instructions were given to not sign legal documents, drive or operate heavy machinery, cars, or equipment while under the influence of narcotic medications. The patient was told and understands that narcotic pain medications should only be used as needed to control pain and that other options of pain control include TENs unit and ice packs/unit.     As there were no other questions to be asked, he was given my business card along with Idalia Mclean MD business card if he has any questions or concerns prior to surgery or in the postop period.

## 2022-01-03 NOTE — H&P
Topher Cr  is here for a completion of his perioperative paperwork. he  Is scheduled to undergo     left              a. Knee arthroscopic medial meniscectomy                b. Knee arthroscopic possible plica excision              c. Knee arthroscopic possible chondroplasty on 1/4/22.      He is a healthy individual but does need clearance for this procedure which he has received from [facility].     PAST MEDICAL HISTORY:   Past Medical History:   Diagnosis Date    Allergy     Blood clotting tendency     DJD (degenerative joint disease)     Hyperlipidemia     Low back strain, initial encounter 5/16/2019    Morbid obesity with BMI of 40.0-44.9, adult 3/29/2018    Obesity     Urticaria      PAST SURGICAL HISTORY:   Past Surgical History:   Procedure Laterality Date    ARTHROSCOPY OF KNEE Right     COLONOSCOPY N/A 2/15/2019    Procedure: COLONOSCOPY;  Surgeon: Boris Carey MD;  Location: [facility];  Service: Endoscopy;  Laterality: N/A;    ESOPHAGOGASTRODUODENOSCOPY N/A 2/15/2019    Procedure: ESOPHAGOGASTRODUODENOSCOPY (EGD);  Surgeon: Boris Carey MD;  Location: [facility];  Service: Endoscopy;  Laterality: N/A;    KNEE SURGERY Right      FAMILY HISTORY:   Family History   Problem Relation Age of Onset    Asthma Mother     Heart disease Father     Asthma Brother     Colon cancer Neg Hx     Esophageal cancer Neg Hx     Rectal cancer Neg Hx     Stomach cancer Neg Hx     Ulcerative colitis Neg Hx     Irritable bowel syndrome Neg Hx     Crohn's disease Neg Hx     Celiac disease Neg Hx     Melanoma Neg Hx      SOCIAL HISTORY:   Social History     Socioeconomic History    Marital status:    Tobacco Use    Smoking status: Never Smoker    Smokeless tobacco: Never Used   Substance and Sexual Activity    Alcohol use: No     Alcohol/week: 0.0 standard drinks    Drug use: No   Social History Narrative    Works in [employer], nonsmoker       MEDICATIONS:   Current Outpatient  "Medications:     enoxaparin (LOVENOX) 40 mg/0.4 mL Syrg, Inject 0.4 mLs (40 mg total) into the skin once daily. For DVT prophylaxis. Start use on morning after surgery., Disp: 28 each, Rfl: 0    ferrous gluconate 324 mg (37.5 mg iron) Tab tablet, Take 1 tablet (324 mg total) by mouth daily with breakfast. (Patient not taking: Reported on 1/3/2022), Disp: 30 tablet, Rfl: 2    HYDROcodone-acetaminophen (NORCO)  mg per tablet, Take 1 tablet by mouth every 4-6 hours for pain., Disp: 15 tablet, Rfl: 0    ketoconazole (NIZORAL) 2 % shampoo, Wash face (especially around nose and beard area), ears with medicated shampoo at least 2-3x/week - let sit at least 5 minutes prior to rinsing (Patient not taking: Reported on 1/3/2022), Disp: 120 mL, Rfl: 5    promethazine (PHENERGAN) 25 MG tablet, Take 1 tablet (25 mg total) by mouth every 6 (six) hours as needed for Nausea., Disp: 12 tablet, Rfl: 0    traMADoL (ULTRAM) 50 mg tablet, Take 1-2 tablets ( mg total) by mouth every 6 (six) hours as needed for Pain., Disp: 21 tablet, Rfl: 0    triamcinolone acetonide 0.1% (KENALOG) 0.1 % cream, Apply topically 2 (two) times daily as needed (rash on neck). Avoid use on face, body folds, groin/genitalia. (Patient not taking: Reported on 1/3/2022), Disp: 45 g, Rfl: 3  ALLERGIES:   Review of patient's allergies indicates:   Allergen Reactions    Cucumber fruit extract Hives and Itching       VITAL SIGNS: /85   Pulse 72   Ht 5' 10" (1.778 m)   Wt (!) 150.6 kg (332 lb)   BMI 47.64 kg/m²      Risks, indications and benefits of the surgical procedure were discussed with the patient. All questions with regard to surgery, rehab, expected return to functional activities, activities of daily living and recreational endeavors were answered to his satisfaction.    It was explained to the patient that there may be an increase in surgical risks if the patient has certain co-morbidities such as but not limited to: Obesity, " Cardiovascular issues (CHF, CAD, Arrhythmias), chronic pulmonary issues, previous or current neurovascular/neurological issues, previous strokes, diabetes mellitus, previous wound healing issues, previous wound or skin infections, PVD, clotting disorders, if the patient uses chronic steroids, if the patient takes or has immune compromising medications or diseases, or has previously or currently used tobacco products.     The patient verbalized that he/she does not have any additional clotting, bleeding, or blood disorders, other than what is list in her chart on today's review.     Then a brief history and physical exam were performed.    Review of Systems   Constitution: Negative. Negative for chills, fever and night sweats.   HENT: Negative for congestion and headaches.    Eyes: Negative for blurred vision, left vision loss and right vision loss.   Cardiovascular: Negative for chest pain and syncope.   Respiratory: Negative for cough and shortness of breath.    Endocrine: Negative for polydipsia, polyphagia and polyuria.   Hematologic/Lymphatic: Negative for bleeding problem. Does not bruise/bleed easily.   Skin: Negative for dry skin, itching and rash.   Musculoskeletal: Negative for falls and muscle weakness.   Gastrointestinal: Negative for abdominal pain and bowel incontinence.   Genitourinary: Negative for bladder incontinence and nocturia.   Neurological: Negative for disturbances in coordination, loss of balance and seizures.   Psychiatric/Behavioral: Negative for depression. The patient does not have insomnia.    Allergic/Immunologic: Negative for hives and persistent infections.     PHYSICAL EXAM:  GEN: A&Ox3, WD WN NAD  HEENT: WNL  CHEST: CTAB, no W/R/R  HEART: RRR, no M/R/G  ABD: Soft, NT ND, BS x4 QUADS  MS; See Epic  NEURO: CN II-XII intact       The surgical consent was then reviewed with the patient, who agreed with all the contents of the consent form and it was signed. he was then given the  surgery packet to bring with him to surgery center for the anesthesia portion of his perioperative paperwork (if needed)   For all physicians except for Dr. Quintanilla, we will email and possibly fax the consent forms and booking sheets to [facility].    The patient was given the opportunity to ask questions about the surgical plan and consent form, and once no other questions were asked, I proceeded with the pre-op appointment.    PHYSICAL THERAPY:  He was also instructed regarding physical therapy and will begin on  POD1. He was given a copy of the original prescription to schedule. Another copy of this prescription was also faxed to [facility].    POST OP CARE:instructions were reviewed including care of the wound and dressing after surgery and when he can shower. Patient was told not sleep or lay on there surgical extremity following surgery as this could cause repair damage, tissue damage, or nerve injury.    An extensive amount of time was spent on discussion of the following information based on what type of surgery the patient was having. Patient expressed understanding of the material below:    Shoulder surgery or upper extremity surgery requiring post-op sling:  It was explained to the patient that they should remove their arm from the sling approximately 6 times per day to do full elbow ROM (flexion and extension) and full supination and pronation of the elbow for approximately 5 minutes at a time to help prevent elbow stiffness, nerve pain or problems, or nerve injury. They were told to contact us if they begin having numbness and tingling of there surgical extremity that persists longer then 1 day without relief.     Extremity surgery requiring a splint:   It was explained to patient on how to properly elevated position there extremity to prevent pressure ulcers from occurring. I made sure that the patient understood that that surgical site may be numb following surgery and prevent them from  feeling pressure pain that they would normal feel if a pressure injury was occurring. Pressure ulcers and there causes were discussed with the patient today.     Post-operative splint:  It was explain to the patient that they can contact us at anytime if they feel that there is a problem with their splint or under their splint that needs evaluation. If there is concern, questions, or discomfort with the splint then they can present to either our clinic or the [facility] ED for removal, evaluation, and replacement of the splint.    CRUTCHES OR WALKER: It was explained to the patient that if they are having a lower extremity surgery that they will require either a walker or crutches to ambulate safely with after surgery. It was explained that a cane or other assistive devices are not sufficient to safely ambulate with after surgery. I explained to the patient that I will place an order for them to receive either crutches or a walker after surgery to go home with. It was explained that if they have crutches or a walker at home already, that they are REQUIRED to bring them to the hospital on the day of surgery. It was explained that if they do not have them at the hospital on the day of surgery that they WILL be provided a new pair or crutches or a walker to go home with to ensure ambulation will be safe if the patient needs to stop somewhere on the way home.      PAIN MANAGEMENT: Topher Cr was also given a pain management regime, which includes the TENS unit given to him by antonieta along with the education required for its use. He was also instructed regarding the Polar ice unit that will be in place after surgery and his postoperative pain medications.     PAIN MEDICATION:  Norco 10/325mg 1 po q 4-6 hours prn pain  Ultram 50 mg Take 1-2 p.o. q.6 hours p.r.n. breakthrough pain,   Phenergan 25 mg one p.o. q.6 hours p.r.n. nausea and vomiting.    DVT prophylaxis was discussed with the patient today including  risk factors for developing DVTs and history of DVTs. The patient was asked if any specific recommendations were given from the doctor/s that did pre-operative surgical clearance. The patient was then given an education sheet about DVTs and PE with warning signs and symptoms of both and steps to take if they suspect either of these.    This along with the Modified Caprini risk assessment model for VTE in general surgical patients was used to determine the patient's DVT risk.     From: Isabella MK, Tre DA, Hortensia SM, et al. Prevention of VTE in nonorthopedic surgical patients: antithrombotic therapy and prevention of thrombosis, 9th ed: American College of Chest Physicians evidence-based clinical practical guidelines. Chest 2012; 141:e227S. Copyright © 2012. Reproduced with permission from the American College of Chest Physicians.    The below listed DVT prophylaxis regimen along with bilateral VITA compression stockings will be used post-op. Length of treatment has been determined to be 10-42 days post-op by the above noted Caprini assessment model.     History of lower extremity DVT.     Will use:  Lovenox 40mg, Inject once daily subcutaneously for 28 days after surgery.     Patient denies history of seizures.     The patient was told that narcotic pain medications may make them drowsy and instructions were given to not sign legal documents, drive or operate heavy machinery, cars, or equipment while under the influence of narcotic medications. The patient was told and understands that narcotic pain medications should only be used as needed to control pain and that other options of pain control include TENs unit and ice packs/unit.     As there were no other questions to be asked, he was given my business card along with Idalia Mclean MD business card if he has any questions or concerns prior to surgery or in the postop period.

## 2022-01-04 ENCOUNTER — ANESTHESIA (OUTPATIENT)
Dept: SURGERY | Facility: HOSPITAL | Age: 57
End: 2022-01-04
Payer: COMMERCIAL

## 2022-01-04 ENCOUNTER — ANESTHESIA EVENT (OUTPATIENT)
Dept: SURGERY | Facility: HOSPITAL | Age: 57
End: 2022-01-04
Payer: COMMERCIAL

## 2022-01-04 ENCOUNTER — HOSPITAL ENCOUNTER (OUTPATIENT)
Facility: HOSPITAL | Age: 57
Discharge: HOME | End: 2022-01-04
Attending: ORTHOPAEDIC SURGERY | Admitting: ORTHOPAEDIC SURGERY
Payer: COMMERCIAL

## 2022-01-04 VITALS
HEIGHT: 70 IN | RESPIRATION RATE: 23 BRPM | SYSTOLIC BLOOD PRESSURE: 154 MMHG | OXYGEN SATURATION: 95 % | DIASTOLIC BLOOD PRESSURE: 88 MMHG | TEMPERATURE: 98 F | HEART RATE: 89 BPM | BODY MASS INDEX: 45.1 KG/M2 | WEIGHT: 315 LBS

## 2022-01-04 DIAGNOSIS — S83.242A ACUTE MEDIAL MENISCUS TEAR OF LEFT KNEE: ICD-10-CM

## 2022-01-04 DIAGNOSIS — S83.242A ACUTE MEDIAL MENISCUS TEAR OF LEFT KNEE, INITIAL ENCOUNTER: ICD-10-CM

## 2022-01-04 PROCEDURE — 36000710: Performed by: ORTHOPAEDIC SURGERY

## 2022-01-04 PROCEDURE — 29881 ARTHRS KNE SRG MNISECTMY M/L: CPT | Mod: AS,LT,, | Performed by: PHYSICIAN ASSISTANT

## 2022-01-04 PROCEDURE — 25000003 PHARM REV CODE 250: Performed by: PHYSICIAN ASSISTANT

## 2022-01-04 PROCEDURE — 25000003 PHARM REV CODE 250: Performed by: NURSE ANESTHETIST, CERTIFIED REGISTERED

## 2022-01-04 PROCEDURE — 71000033 HC RECOVERY, INTIAL HOUR: Performed by: ORTHOPAEDIC SURGERY

## 2022-01-04 PROCEDURE — D9220A PRA ANESTHESIA: ICD-10-PCS | Mod: CRNA,,, | Performed by: NURSE ANESTHETIST, CERTIFIED REGISTERED

## 2022-01-04 PROCEDURE — 25000003 PHARM REV CODE 250: Performed by: ORTHOPAEDIC SURGERY

## 2022-01-04 PROCEDURE — 29881 PR KNEE SCOPE SINGLE MENISECECTOMY: ICD-10-PCS | Mod: AS,LT,, | Performed by: PHYSICIAN ASSISTANT

## 2022-01-04 PROCEDURE — 29881 ARTHRS KNE SRG MNISECTMY M/L: CPT | Mod: LT,,, | Performed by: ORTHOPAEDIC SURGERY

## 2022-01-04 PROCEDURE — 94761 N-INVAS EAR/PLS OXIMETRY MLT: CPT

## 2022-01-04 PROCEDURE — 37000008 HC ANESTHESIA 1ST 15 MINUTES: Performed by: ORTHOPAEDIC SURGERY

## 2022-01-04 PROCEDURE — 37000009 HC ANESTHESIA EA ADD 15 MINS: Performed by: ORTHOPAEDIC SURGERY

## 2022-01-04 PROCEDURE — D9220A PRA ANESTHESIA: ICD-10-PCS | Mod: ANES,,, | Performed by: ANESTHESIOLOGY

## 2022-01-04 PROCEDURE — 29881 PR KNEE SCOPE SINGLE MENISECECTOMY: ICD-10-PCS | Mod: LT,,, | Performed by: ORTHOPAEDIC SURGERY

## 2022-01-04 PROCEDURE — 63600175 PHARM REV CODE 636 W HCPCS: Performed by: NURSE ANESTHETIST, CERTIFIED REGISTERED

## 2022-01-04 PROCEDURE — 25000003 PHARM REV CODE 250: Performed by: ANESTHESIOLOGY

## 2022-01-04 PROCEDURE — 99900035 HC TECH TIME PER 15 MIN (STAT)

## 2022-01-04 PROCEDURE — 71000015 HC POSTOP RECOV 1ST HR: Performed by: ORTHOPAEDIC SURGERY

## 2022-01-04 PROCEDURE — D9220A PRA ANESTHESIA: Mod: CRNA,,, | Performed by: NURSE ANESTHETIST, CERTIFIED REGISTERED

## 2022-01-04 PROCEDURE — 63600175 PHARM REV CODE 636 W HCPCS: Performed by: ORTHOPAEDIC SURGERY

## 2022-01-04 PROCEDURE — D9220A PRA ANESTHESIA: Mod: ANES,,, | Performed by: ANESTHESIOLOGY

## 2022-01-04 PROCEDURE — 36000711: Performed by: ORTHOPAEDIC SURGERY

## 2022-01-04 PROCEDURE — 27201423 OPTIME MED/SURG SUP & DEVICES STERILE SUPPLY: Performed by: ORTHOPAEDIC SURGERY

## 2022-01-04 RX ORDER — TRAMADOL HYDROCHLORIDE 50 MG/1
100 TABLET ORAL EVERY 6 HOURS PRN
Status: DISCONTINUED | OUTPATIENT
Start: 2022-01-04 | End: 2022-01-04 | Stop reason: HOSPADM

## 2022-01-04 RX ORDER — SUCCINYLCHOLINE CHLORIDE 20 MG/ML
INJECTION INTRAMUSCULAR; INTRAVENOUS
Status: DISCONTINUED | OUTPATIENT
Start: 2022-01-04 | End: 2022-01-04

## 2022-01-04 RX ORDER — ONDANSETRON 2 MG/ML
INJECTION INTRAMUSCULAR; INTRAVENOUS
Status: DISCONTINUED | OUTPATIENT
Start: 2022-01-04 | End: 2022-01-04

## 2022-01-04 RX ORDER — MIDAZOLAM HYDROCHLORIDE 1 MG/ML
INJECTION, SOLUTION INTRAMUSCULAR; INTRAVENOUS
Status: DISCONTINUED | OUTPATIENT
Start: 2022-01-04 | End: 2022-01-04

## 2022-01-04 RX ORDER — KETOROLAC TROMETHAMINE 30 MG/ML
INJECTION, SOLUTION INTRAMUSCULAR; INTRAVENOUS
Status: DISCONTINUED | OUTPATIENT
Start: 2022-01-04 | End: 2022-01-04 | Stop reason: HOSPADM

## 2022-01-04 RX ORDER — FENTANYL CITRATE 50 UG/ML
INJECTION, SOLUTION INTRAMUSCULAR; INTRAVENOUS
Status: DISCONTINUED | OUTPATIENT
Start: 2022-01-04 | End: 2022-01-04

## 2022-01-04 RX ORDER — PROMETHAZINE HYDROCHLORIDE 25 MG/1
25 TABLET ORAL EVERY 6 HOURS PRN
Status: DISCONTINUED | OUTPATIENT
Start: 2022-01-04 | End: 2022-01-04 | Stop reason: HOSPADM

## 2022-01-04 RX ORDER — ROPIVACAINE HYDROCHLORIDE 5 MG/ML
INJECTION, SOLUTION EPIDURAL; INFILTRATION; PERINEURAL
Status: DISCONTINUED | OUTPATIENT
Start: 2022-01-04 | End: 2022-01-04 | Stop reason: HOSPADM

## 2022-01-04 RX ORDER — SODIUM CHLORIDE 9 MG/ML
INJECTION, SOLUTION INTRAVENOUS CONTINUOUS
Status: DISCONTINUED | OUTPATIENT
Start: 2022-01-04 | End: 2022-01-04 | Stop reason: HOSPADM

## 2022-01-04 RX ORDER — LIDOCAINE HYDROCHLORIDE 20 MG/ML
INJECTION INTRAVENOUS
Status: DISCONTINUED | OUTPATIENT
Start: 2022-01-04 | End: 2022-01-04

## 2022-01-04 RX ORDER — SODIUM CHLORIDE 0.9 % (FLUSH) 0.9 %
10 SYRINGE (ML) INJECTION
Status: DISCONTINUED | OUTPATIENT
Start: 2022-01-04 | End: 2022-01-04 | Stop reason: HOSPADM

## 2022-01-04 RX ORDER — PROPOFOL 10 MG/ML
VIAL (ML) INTRAVENOUS
Status: DISCONTINUED | OUTPATIENT
Start: 2022-01-04 | End: 2022-01-04

## 2022-01-04 RX ORDER — MORPHINE SULFATE 2 MG/ML
2 INJECTION, SOLUTION INTRAMUSCULAR; INTRAVENOUS EVERY 10 MIN PRN
Status: DISCONTINUED | OUTPATIENT
Start: 2022-01-04 | End: 2022-01-04 | Stop reason: HOSPADM

## 2022-01-04 RX ORDER — DEXAMETHASONE SODIUM PHOSPHATE 4 MG/ML
INJECTION, SOLUTION INTRA-ARTICULAR; INTRALESIONAL; INTRAMUSCULAR; INTRAVENOUS; SOFT TISSUE
Status: DISCONTINUED | OUTPATIENT
Start: 2022-01-04 | End: 2022-01-04

## 2022-01-04 RX ORDER — OXYCODONE HYDROCHLORIDE 5 MG/1
10 TABLET ORAL EVERY 4 HOURS PRN
Status: DISCONTINUED | OUTPATIENT
Start: 2022-01-04 | End: 2022-01-04 | Stop reason: HOSPADM

## 2022-01-04 RX ORDER — HYDROMORPHONE HYDROCHLORIDE 1 MG/ML
0.2 INJECTION, SOLUTION INTRAMUSCULAR; INTRAVENOUS; SUBCUTANEOUS EVERY 5 MIN PRN
Status: DISCONTINUED | OUTPATIENT
Start: 2022-01-04 | End: 2022-01-04 | Stop reason: HOSPADM

## 2022-01-04 RX ORDER — EPINEPHRINE 1 MG/ML
INJECTION, SOLUTION INTRACARDIAC; INTRAMUSCULAR; INTRAVENOUS; SUBCUTANEOUS
Status: DISCONTINUED | OUTPATIENT
Start: 2022-01-04 | End: 2022-01-04 | Stop reason: HOSPADM

## 2022-01-04 RX ORDER — METHOCARBAMOL 500 MG/1
1000 TABLET, FILM COATED ORAL ONCE
Status: COMPLETED | OUTPATIENT
Start: 2022-01-04 | End: 2022-01-04

## 2022-01-04 RX ORDER — KETAMINE HYDROCHLORIDE 100 MG/ML
INJECTION, SOLUTION INTRAMUSCULAR; INTRAVENOUS
Status: DISCONTINUED | OUTPATIENT
Start: 2022-01-04 | End: 2022-01-04

## 2022-01-04 RX ORDER — ONDANSETRON 2 MG/ML
4 INJECTION INTRAMUSCULAR; INTRAVENOUS EVERY 12 HOURS PRN
Status: DISCONTINUED | OUTPATIENT
Start: 2022-01-04 | End: 2022-01-04 | Stop reason: HOSPADM

## 2022-01-04 RX ORDER — CEFAZOLIN SODIUM 1 G/3ML
INJECTION, POWDER, FOR SOLUTION INTRAMUSCULAR; INTRAVENOUS
Status: DISCONTINUED | OUTPATIENT
Start: 2022-01-04 | End: 2022-01-04

## 2022-01-04 RX ORDER — ROCURONIUM BROMIDE 10 MG/ML
INJECTION, SOLUTION INTRAVENOUS
Status: DISCONTINUED | OUTPATIENT
Start: 2022-01-04 | End: 2022-01-04

## 2022-01-04 RX ORDER — FAMOTIDINE 10 MG/ML
INJECTION INTRAVENOUS
Status: DISCONTINUED | OUTPATIENT
Start: 2022-01-04 | End: 2022-01-04

## 2022-01-04 RX ORDER — KETAMINE HYDROCHLORIDE 100 MG/ML
INJECTION, SOLUTION INTRAMUSCULAR; INTRAVENOUS
Status: DISCONTINUED | OUTPATIENT
Start: 2022-01-04 | End: 2022-01-04 | Stop reason: HOSPADM

## 2022-01-04 RX ADMIN — OXYCODONE 10 MG: 5 TABLET ORAL at 08:01

## 2022-01-04 RX ADMIN — KETAMINE HYDROCHLORIDE 25 MG: 100 INJECTION, SOLUTION, CONCENTRATE INTRAMUSCULAR; INTRAVENOUS at 07:01

## 2022-01-04 RX ADMIN — ONDANSETRON 4 MG: 2 INJECTION, SOLUTION INTRAMUSCULAR; INTRAVENOUS at 07:01

## 2022-01-04 RX ADMIN — MIDAZOLAM HYDROCHLORIDE 2 MG: 1 INJECTION, SOLUTION INTRAMUSCULAR; INTRAVENOUS at 06:01

## 2022-01-04 RX ADMIN — FENTANYL CITRATE 100 MCG: 50 INJECTION, SOLUTION INTRAMUSCULAR; INTRAVENOUS at 07:01

## 2022-01-04 RX ADMIN — METHOCARBAMOL 1000 MG: 500 TABLET ORAL at 09:01

## 2022-01-04 RX ADMIN — FAMOTIDINE 20 MG: 10 INJECTION, SOLUTION INTRAVENOUS at 07:01

## 2022-01-04 RX ADMIN — DEXAMETHASONE SODIUM PHOSPHATE 8 MG: 4 INJECTION, SOLUTION INTRAMUSCULAR; INTRAVENOUS at 07:01

## 2022-01-04 RX ADMIN — CEFAZOLIN 3 G: 330 INJECTION, POWDER, FOR SOLUTION INTRAMUSCULAR; INTRAVENOUS at 07:01

## 2022-01-04 RX ADMIN — PROPOFOL 200 MG: 10 INJECTION, EMULSION INTRAVENOUS at 07:01

## 2022-01-04 RX ADMIN — SODIUM CHLORIDE: 0.9 INJECTION, SOLUTION INTRAVENOUS at 06:01

## 2022-01-04 RX ADMIN — ROCURONIUM BROMIDE 10 MG: 10 INJECTION, SOLUTION INTRAVENOUS at 07:01

## 2022-01-04 RX ADMIN — SUCCINYLCHOLINE CHLORIDE 170 MG: 20 INJECTION, SOLUTION INTRAMUSCULAR; INTRAVENOUS at 07:01

## 2022-01-04 RX ADMIN — LIDOCAINE HYDROCHLORIDE 75 MG: 20 INJECTION, SOLUTION INTRAVENOUS at 07:01

## 2022-01-04 NOTE — OPERATIVE NOTE ADDENDUM
Certification of Assistant at Surgery       Surgery Date: 1/4/2022     Participating Surgeons:  Surgeon(s) and Role:     * Idalia Mclean MD - Primary    ANDREW Cho PA-C - 1st Assistant    Procedures:  Procedure(s) (LRB):  ARTHROSCOPY, KNEE, WITH MENISCECTOMY (Left)  CHONDROPLASTY, KNEE (Left)  SYNOVECTOMY, KNEE (Left)    Assistant Surgeon's Certification of Necessity:  I understand that section 1842 (b) (6) (d) of the Social Security Act generally prohibits Medicare Part B reasonable charge payment for the services of assistants at surgery in teaching hospitals when qualified residents are available to furnish such services. I certify that the services for which payment is claimed were medically necessary, and that no qualified resident was available to perform the services. I further understand that these services are subject to post-payment review by the Medicare carrier.        Jaskaran Cho PA-C    01/04/2022  8:10 AM

## 2022-01-04 NOTE — DISCHARGE SUMMARY
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    DISPOSITION: Home or Self Care    FINAL DIAGNOSIS: left knee meniscus tear    FOLLOWUP: In clinic    DISCHARGE INSTRUCTIONS:    Discharge Procedure Orders   Diet general     Call MD for:  temperature >100.4     Call MD for:  persistent nausea and vomiting     Call MD for:  severe uncontrolled pain     Call MD for:  difficulty breathing, headache or visual disturbances     Call MD for:  redness, tenderness, or signs of infection (pain, swelling, redness, odor or green/yellow discharge around incision site)     Call MD for:  hives     Call MD for:  persistent dizziness or light-headedness     Keep surgical extremity elevated     Ice to affected area   Order Comments: using barrier between ice and skin (specify duration&frequency)     No driving, operating heavy equipment or signing legal documents while taking pain medication     Remove dressing in 72 hours     Shower on day dressing removed (No bath)     Weight bearing as tolerated

## 2022-01-04 NOTE — ANESTHESIA PREPROCEDURE EVALUATION
01/04/2022  Topher Cr is a 56 y.o., male.    Anesthesia Evaluation    I have reviewed the Patient Summary Reports.    I have reviewed the Nursing Notes. I have reviewed the NPO Status.   I have reviewed the Medications.     Review of Systems  Anesthesia Hx:  No problems with previous Anesthesia  Denies Family Hx of Anesthesia complications.   Denies Personal Hx of Anesthesia complications.   Social:  No Alcohol Use, Non-Smoker    Cardiovascular:  Cardiovascular Normal     Pulmonary:  Pulmonary Normal    Endocrine:   Morbid obesity       Physical Exam  General:  Morbid Obesity    Airway/Jaw/Neck:  Airway Findings: Mouth Opening: Normal Tongue: Normal  General Airway Assessment: Adult  Mallampati: II       Chest/Lungs:  Chest/Lungs Clear    Heart/Vascular:  Heart Findings: Normal       Mental Status:  Mental Status Findings:  Alert and Oriented, Cooperative         Anesthesia Plan  Type of Anesthesia, risks & benefits discussed:  Anesthesia Type:  general    Patient's Preference: general  Plan Factors:          Intra-op Monitoring Plan: standard ASA monitors  Intra-op Monitoring Plan Comments:   Post Op Pain Control Plan: multimodal analgesia and IV/PO Opioids PRN  Post Op Pain Control Plan Comments:     Induction:   IV  Beta Blocker:  Patient is not currently on a Beta-Blocker (No further documentation required).       Informed Consent: Patient understands risks and agrees with Anesthesia plan.  Questions answered. Anesthesia consent signed with patient.  ASA Score: 2     Day of Surgery Review of History & Physical:    H&P update referred to the surgeon.         Ready For Surgery From Anesthesia Perspective.

## 2022-01-04 NOTE — PLAN OF CARE
Patient ambulated to pre op room 9. AAO times 3  Addressed all questions and concerns.  Wife will return to facility for discharge instructions  Bedside pharmacy to deliver home medications  Belongings placed in locker.  Patient states he has crutches

## 2022-01-04 NOTE — DISCHARGE INSTRUCTIONS
[employer] CARE CUBE COLD THERAPY SYSTEM    The Polar Care Cube Cold Therapy System is simple and reliable. It is easy to use, compact design makes it great for home use. With the addition of ice and water, you will enjoy 6-8 hours of effortless cold therapy. Proper use requires an insulation barrier between the pad and the patient's skin.  Instructions on how to use the Polar Care Cube Cold Therapy System Below:

## 2022-01-04 NOTE — OP NOTE
DATE OF PROCEDURE: 01/04/2022    SURGEON:  Idalia Mclean M.D    ASSISTANT: ANDREW Cho PA-C  The use of an assistant was medically necessary for positioning, skin retraction, and assistance with this procedure. The procedure could not be performed without the use of an assistant.   There was no qualified resident/fellow available for assistance with this procedure.    PREOPERATIVE DIAGNOSES:   left  1. knee medial meniscus tear   2. knee plica.   3. knee possible chondromalacia  4. knee synovitis  5. Knee adhesions    POSTOPERATIVE DIAGNOSES:   left  1. knee medial meniscus tear   2. knee plica.   3. knee chondromalacia  4. knee synovitis.   5. Knee adhesions    PROCEDURE PERFORMED:   left  1. knee arthroscopic chondroplasty (CPT 14702)  2. knee arthroscopic medial (CPT 43726) meniscectomy   3. knee arthroscopic partial synovectomy/debridement (CPT 39243).   4. knee arthroscopic plica excision(CPT 87056).    5. Knee arthroscopic lysis of adhesions (CPT 37021)    ANESTHESIA: General with local 0.5% ropivicaine 30ml (5mg/ml), 60 mg ketamine, 60mg toradol (2ml toradol (30mg/ml))    BLOOD LOSS: Minimal.     DRAINS: None.     TOURNIQUET TIME: None.     COMPLICATIONS: None.     CONDITION ON TRANSFER: The patient was extubated and moved to the recovery room in stable condition, with compartments soft and capillary refill less than a   second in all digits.     BRIEF INDICATION OF MEDICAL NECESSITY: The patient is a 56 y.o. year-old male who has history and physical examination findings consistent with the above. Nonoperative versus operative options were discussed. The risks and benefits were discussed with the patient. The patient acknowledged understanding and wished to proceed with operative intervention. Informed consent was obtained prior to the procedure. Details of the surgical procedure were explained, including incisions and probable rehabilitation course. The patient understands the likely length of convalescence  after surgery; and we have explained the risks, benefits, and alternatives of surgery. Reasonable expectations and potential complications were discussed and acknowledged, including but not limited to infection, bleeding, blood clots, (DVT and/or PE), nerve injury, retear, instability, continued pain and stiffness. It was also explained that there was a chance of failure which may require further management. The patient agreed and understood and wished to proceed.     EXAMINATION UNDER ANESTHESIA of the OPERATIVE left KNEE: ROM 0-130 degrees, negative Lachman, negative pivot shift, stable to varus-valgus stress testing, negative effusion.     EXAMINATION UNDER ANESTHESIA of the NON-OPERATED right KNEE: ROM 0-130 degrees, negative Lachman, negative pivot shift, stable to varus-valgus stress testing, negative effusion.     PROCEDURE IN DETAIL: The correct operative site was marked by the operative surgeon.  The patient was then taken to the operating room and placed supine on the operating room table. General anesthesia was administered by the anesthesia team. All pressure points were carefully padded and checked. Preoperative antibiotics were administered. A well-padded tourniquet was placed high on the operative thigh. Examination was begun with the above findings. The non-operative leg was then placed a well-padded well-leg ibanez, in a comfortable position. The operative leg was placed in an arthroscopic leg ibanez at the level of the tourniquet. The operative leg was prepped and draped in the usual sterile fashion. After prepping and draping, the appropriate landmarks were noted on the skin.  2cc skin and sub-cutaneous tissue was infilttrated with local anesthetic mixture superolaterally at needle insufflation site. The knee was insufflated supero-laterally with saline. 9cc skin wheal and sub-cutaneous tissue and fat pad was infilttrated with local anesthetic mixture at both portal sites; mid-lateral followed by  infero-medial portals were created, and a systematic examination of the joint revealed the following:    There was no evidence of any suprapatellar pouch adhesions or compartmentalization.  There was no evidence of any loose bodies in the medial or lateral gutters.     In the patellofemoral compartment, there was chondral damage to:  Patella 5 x 3 mm grade 2  Chondroplasty was performed using arthroscopic shaver.    There was chondral damage to:  Medial femoral condyle 15 x 5 mm grade 3  Chondroplasty was performed using arthroscopic shaver.    In the medial compartment there was no evidence of meniscal instability.   Posterior horn medial meniscus tear,  vertical was debrided with arthroscopic shaver and biter.  50% was debrided over an area of 1.5 cm.  Root and hoop fibers remained intact.    Attention was then turned to the notch. The ACL and PCL were probed carefully and found to be stable.     There was a hypertrophic infrapatellar plica.  This was debrided using arthroscopic biter and shaver.    There was some scar about the ACL.  This was debrided in the standard fashion and lysis of adhesions was performed.    In the lateral compartment there was no evidence meniscal or chondral damage or meniscal instability.     Synovitis was debrided in the knee as needed to the areas of concern in medial and lateral compartments.      The knee and incisions were then copiously irrigated and fluid was extravasated using suction.  The arthroscopic portal incisions were closed using inverted 4-0 Monocryl suture.  5cc skin and sub-cutaneous tissue and around portals were infilttrated with local anesthetic mixture at both portal sites.  Steri-Strips were placed with Mastisol. Sterile TENS unit pads were placed which were medically necessary for pain relief. Wounds were dressed with Xeroform, 4x4s, and cast padding. VITA hose was placed on the operative leg to match that of the VITA hose placed preoperatively on the  non-operative leg. Iceman was secured.  The patient was extubated and moved to the recovery room in stable condition with compartments soft and capillary refill less than a second in all digits.     POSTOPERATIVE PLAN: We will be following the arthroscopic partial meniscectomy guidelines with emphasis on patellar mobility.  This was discussed this with the patient's family after surgery.

## 2022-01-04 NOTE — PLAN OF CARE
VSS. Patient able to tolerate oral liquids. Patient reports tolerable pain level for discharge. Patient/family received home medication per bedside delivery. Dressing intact. Polar care intact, power adapter placed with patient belongings. Thigh TEDs intact. Patient instructed not to wear VITA hose without wearing closed-back shoes or  socks due to increased risk of falls, verbalized understanding. No distress noted. Patient states he is ready for discharge. Discharge instructions reviewed with patient and wife, verbalized understanding. IV discontinued with catheter tip intact. Family at bedside to help patient dress. Patient wheeled to lobby via staff.

## 2022-01-05 ENCOUNTER — CLINICAL SUPPORT (OUTPATIENT)
Dept: REHABILITATION | Facility: HOSPITAL | Age: 57
End: 2022-01-05
Payer: COMMERCIAL

## 2022-01-05 DIAGNOSIS — R29.898 DECREASED STRENGTH INVOLVING KNEE JOINT: ICD-10-CM

## 2022-01-05 DIAGNOSIS — M25.662 DECREASED ROM OF LEFT KNEE: Primary | ICD-10-CM

## 2022-01-05 DIAGNOSIS — S83.242A ACUTE MEDIAL MENISCUS TEAR OF LEFT KNEE, INITIAL ENCOUNTER: ICD-10-CM

## 2022-01-05 PROCEDURE — 97161 PT EVAL LOW COMPLEX 20 MIN: CPT

## 2022-01-05 PROCEDURE — 97110 THERAPEUTIC EXERCISES: CPT

## 2022-01-05 NOTE — PLAN OF CARE
"[facility]  Physical Therapy Initial Evaluation    Name: Topher Cr  [facility] Number: [id number]    Therapy Diagnosis:   Encounter Diagnoses   Name Primary?    Acute medial meniscus tear of left knee, initial encounter     Decreased ROM of left knee Yes    Decreased strength involving knee joint      Physician: Jaskaran Cho III, *    Physician Orders: PT Eval and Treat  Medical Diagnosis from Referral: S83.242A (ICD-10-CM) - Acute medial meniscus tear of left knee, initial encounter  Evaluation Date: 1/5/2022  Authorization Period Expiration: 01/12/2023  Plan of Care Expiration: 9/1/2022  Visit # / Visits authorized: 1/ 1    Time In: 0800  Time Out: 0900  Total Billable Time: 54 minutes    Precautions: Standard     DOS: 1/4/2022  Surgeon: Caridad  Procedure:   left  1. knee arthroscopic chondroplasty (CPT 93861)  2. knee arthroscopic medial (CPT 69735) meniscectomy   3. knee arthroscopic partial synovectomy/debridement (CPT 14576).   4. knee arthroscopic plica excision(CPT 51407).    5. Knee arthroscopic lysis of adhesions (CPT 93351)    Subjective     Date of onset: 1/4/2022  History of current condition - Topher reports: before pandemic he was weightlifting getting back in shape. Over the last 3-4 months pain was getting worse in the knee. He c/o 1/10 pain right now. Did not use pain meds this AM. Patient uses ice and TENS machine at home. Has 12-13 stairs at home and is navigating them "no problem" with step-to mechanism. Patient is employed as a  for the [employer] and states he is out of work for 6 weeks.      Imaging     MRI KNEE WITHOUT CONTRAST LEFT     CLINICAL HISTORY:  assess for meniscus tear;Pain in left knee     TECHNIQUE:  Multiplanar, multisequence MRI of the left knee performed per routine protocol without contrast.     COMPARISON:  10/20/2021     FINDINGS:  Menisci:  There is a horizontal tear of the body segment medial meniscus.  " Lateral meniscus is intact.     Ligaments:  There is mucoid degeneration of the ACL.  PCL and LCL complex are intact.  Periligamentous edema about the MCL likely reactive.     Tendons:  Mild insertional quadriceps tendinosis.  Patellar tendon is maintained.     Cartilage:     Patellofemoral: Partial-thickness fissuring noted in the central trochlea.     Medial tibiofemoral: Full-thickness fissuring noted over the central weight-bearing femoral condyle.     Lateral tibiofemoral: Articular cartilage is maintained.     Bone: No fracture or marrow replacing process.     Miscellaneous: Small joint effusion.     Impression:     1. Horizontal tear medial meniscus.  2. Quadriceps tendinosis.  3. Mild patellofemoral and medial tibiofemoral cartilage loss.        Electronically signed by: Chris Yoder MD  Date:                                            11/08/2021  Time:                                           07:45    Prior Therapy: yes for LBP  Exercise Routine/Sport Participation: prior to COVID, none now  Social History: lives with wife and children  Occupation:   Prior Level of Function: able to exercise, complete all ADLs unrestricted  Current Level of Function: limited functional secondary to pain and post op status    Pain:  Current 1/10, worst 7/10, best 1/10   Location: left knee  Description: Aching  Aggravating Factors: Standing and Morning  Easing Factors: pain medication, ice, TENS unit, rest and elevation    Pts goals: return to work, exercise without pain      Medical History:   Past Medical History:   Diagnosis Date    Allergy     Blood clotting tendency     DJD (degenerative joint disease)     Hyperlipidemia     Low back strain, initial encounter 5/16/2019    Morbid obesity with BMI of 40.0-44.9, adult 3/29/2018    Obesity     Urticaria        Surgical History:   Topher Cr  has a past surgical history that includes Knee surgery (Right); Esophagogastroduodenoscopy (N/A,  2/15/2019); Colonoscopy (N/A, 2/15/2019); and Arthroscopy of knee (Right).    Medications:   Topher has a current medication list which includes the following prescription(s): enoxaparin, ferrous gluconate, hydrocodone-acetaminophen, ketoconazole, promethazine, tramadol, and triamcinolone acetonide 0.1%.    Allergies:   Review of patient's allergies indicates:   Allergen Reactions    Cucumber fruit extract Hives and Itching        Objective       Observation: patient in no acute distress, well-appearing    Gait: antalgic, quad avoidance    Knee Active Range of Motion:   Right  Left    Flexion 105 99   Extension       Knee Passive Range of Motion:   Right  Left    Flexion 110 103   Extension 4 (lacking) 9 (lacking)         Quad Set: weakened contraction on left relative to right; adequate quad control      Joint Mobility: restricted PFJ on left        Palpation: general peripatellar mild TTP      Sensation: intact      Pulses: normal      Edema: mildly increased swelling generalized to left knee  Girth Measurement Joint line 15 cm below 10 cm above   Right  cm  cm  cm   Left  cm  cm  cm       Special Tests: Not performed today due to post-op status   Strength: Not performed today due to post-op status.         CMS Impairment/Limitation/Restriction for FOTO left knee Survey    Therapist reviewed FOTO scores for Topher Cr on 1/5/2022.   FOTO documents entered into [employer] - see Media section.    Limitation Score: none taken %  Category: Mobility       Treatment     Treatment Time In: 0830  Treatment Time Out: 0855  Total Treatment time separate from Evaluation: 25 minutes     Topher received the treatments listed below:      Therapeutic exercises to develop strength, endurance and ROM for 25 minutes including:  Heel prop   Quad sets  Towel assisted heel slides   PROM knee flexion off edge of bed  DL heel raises       Manual therapy techniques: Joint mobilizations and Soft tissue Mobilization were applied to the: left knee  for 0 minutes, including:        Home Exercises and Patient Education Provided     Education provided:   - Dressing changing, application of dressing, showering precautions, monitoring site for infection, swelling management  - Prognosis, activity modification, goals for therapy, role of therapy for care, exercises/HEP    Written Home Exercises Provided: yes.  Exercises were reviewed and Topher was able to demonstrate them prior to the end of the session.   Pt received a written copy of exercises to perform at home. Topher demonstrated good  understanding of the education provided.     See EMR under patient instructions for exercises given.     Assessment     Topher is a 56 y.o. male referred to outpatient Physical Therapy 1 day s/p L knee medial menisectomy, chondroplasty, synovectomy, plica excision, and lysis of adhesions. Patient is appearing well, in no acute distress. We discussed activity modifications, post-op precautions and priorities and HEP. Patient verbalized understanding of all.       Pt will benefit from skilled outpatient Physical Therapy to address the deficits stated above and in the chart below, provide pt/family education, and to maximize pt's level of independence. Pt prognosis is Good.     Plan of care discussed with patient: Yes  Pt's spiritual, cultural and educational needs considered and patient is agreeable to the plan of care and goals as stated below:       Anticipated Barriers for therapy: none      Medical Necessity is demonstrated by the following  History  Co-morbidities and personal factors that may impact the plan of care Co-morbidities:   high BMI    Personal Factors:   no deficits     low   Examination  Body Structures and Functions, activity limitations and participation restrictions that may impact the plan of care Body Regions:   lower extremities    Body Systems:    gross symmetry  ROM  strength  gross coordinated movement  balance  gait  motor control  motor learning  edema  scar  formation  skin integrity    Participation Restrictions:   none    Activity limitations:   Learning and applying knowledge  No deficit    General Tasks and Commands  No deficit    Communication  No deficit    Mobility  lifting and carrying objects  walking    Self care  No deficit    Domestic Life  No deficit    Interactions/Relationships  No deficit    Life Areas  Recreational Activities to A with health and wellness     Community and Social Life  No deficit          low   Clinical Presentation stable and uncomplicated low   Decision Making/ Complexity Score: low     Goals:  Short Term Goals: 8-10 weeks  1. Pt will be compliant with HEP 50% of prescribed amount.   2. The pt to demo improvement in left knee ROM to equal right knee ROM pain free   3.  The pt to demo good quad set with proper hyperextension moment of the left knee   4. The pt to demo ambualting with elast restricitve AD without major compensatory pattern left knee for at least 20 feet      Long Term Goals: 36 weeks   1. Pt will be compliant with % of prescribed amount.   2. The pt to demo tolerance to a squat at or bellow parallel with uncompensated mechanics x10   3. The pt to demo strength of left LE within 10% of right LE as demo'd on the biodex machine   4. The pt will report full participation in ADLs and IADLs without restrictions related to left knee.       Plan   Plan of care Certification: 1/5/2022 to 9/1/2022.    Outpatient Physical Therapy 2 times weekly for 36 weeks to include the following interventions: Aquatic Therapy, Electrical Stimulation Russian/NMES, Gait Training, Manual Therapy, Moist Heat/ Ice, Neuromuscular Re-ed, Orthotic Management and Training, Patient Education, Self Care, Therapeutic Activities and Therapeutic Exercise.     Jose M Beth, PT , DPT

## 2022-01-05 NOTE — ANESTHESIA POSTPROCEDURE EVALUATION
Anesthesia Post Evaluation    Patient: Topher Cr    Procedure(s) Performed: Procedure(s) (LRB):  ARTHROSCOPY, KNEE, WITH MEDIAL MENISCECTOMY (Left)  CHONDROPLASTY, KNEE (Left)  PARTIAL SYNOVECTOMY, KNEE (Left)  DEBRIDEMENT, KNEE  EXCISION, PLICA, KNEE, ARTHROSCOPIC (Left)  [id number]-PRFYTSTCKNCL (Left)    Final Anesthesia Type: general      Patient location during evaluation: PACU  Patient participation: Yes- Able to Participate  Level of consciousness: awake and alert, awake and oriented  Post-procedure vital signs: reviewed and stable  Pain management: adequate  Airway patency: patent    PONV status at discharge: No PONV  Anesthetic complications: no      Cardiovascular status: blood pressure returned to baseline, stable and hemodynamically stable  Respiratory status: unassisted, spontaneous ventilation and room air  Hydration status: euvolemic  Follow-up not needed.          Vitals Value Taken Time   /88 01/04/22 0931   Temp 36.5 °C (97.7 °F) 01/04/22 0930   Pulse 92 01/04/22 0942   Resp 23 01/04/22 0930   SpO2 95 % 01/04/22 0942   Vitals shown include unvalidated device data.      Event Time   Out of Recovery 09:00:00         Pain/Clay Score: Pain Rating Prior to Med Admin: 6 (1/4/2022  9:30 AM)  Pain Rating Post Med Admin: 4 (1/4/2022  9:30 AM)  Clay Score: 10 (1/4/2022  9:00 AM)

## 2022-01-12 ENCOUNTER — CLINICAL SUPPORT (OUTPATIENT)
Dept: REHABILITATION | Facility: HOSPITAL | Age: 57
End: 2022-01-12
Payer: COMMERCIAL

## 2022-01-12 DIAGNOSIS — R29.898 DECREASED STRENGTH INVOLVING KNEE JOINT: Primary | ICD-10-CM

## 2022-01-12 DIAGNOSIS — S83.242D ACUTE MEDIAL MENISCUS TEAR OF LEFT KNEE, SUBSEQUENT ENCOUNTER: ICD-10-CM

## 2022-01-12 DIAGNOSIS — M25.662 DECREASED ROM OF LEFT KNEE: ICD-10-CM

## 2022-01-12 PROCEDURE — 97110 THERAPEUTIC EXERCISES: CPT

## 2022-01-12 NOTE — PROGRESS NOTES
Physical Therapy Daily Treatment Note     Name: Topher Cr  Clinic Number: [id number]    Therapy Diagnosis:   Encounter Diagnoses   Name Primary?    Decreased strength involving knee joint Yes    Decreased ROM of left knee      Physician: Jaskaran Cho III, *    Visit Date: 1/12/2022    Physician Orders: PT Eval and Treat  Medical Diagnosis from Referral: S83.242A (ICD-10-CM) - Acute medial meniscus tear of left knee, initial encounter  Evaluation Date: 1/5/2022  Authorization Period Expiration: 01/12/2023  Plan of Care Expiration: 9/1/2022  Visit # / Visits authorized: 1/ 20  FOTO: 0/10    Time In: 1630  Time Out: 1740  Total Billable Time: 55 minutes    Precautions: Standard    Subjective     Pt reports: increased pain at the medial knee today 5/10 due to walking around all day shopping with his daughter. He is not currently working and at home with family. Patient is compliant with home exercises.     He was compliant with home exercise program.  Response to previous treatment: no adverse effects  Functional change: improved range of motion and function    Pain: 0/10 currently, 6/10 maximum  Location: left knee      Objective     Knee range of motion:   R: 0-8-115 (cannot reach full extension)  L: 0- (cannot reach full extension)      Topher received therapeutic exercises to develop strength, endurance and ROM for 55 minutes including:  LLLD heel prop 2x5# ankle weight x 10 min   Quad sets x30   SAQ x30  SLR x30  Stationary bicycle x10 min     Topher received the following manual therapy techniques: Joint mobilizations, Manual traction and Soft tissue Mobilization were applied to the: left knee for 0 minutes, including:  Knee extension hinge mobilization with gastroc stretch  PF joint mobilizations medial/lateral/superior/inferior  Fat pad mobilization  Scar mobilization    Topher participated in neuromuscular re-education activities to improve: Balance, Coordination, Kinesthetic,  Sense and Proprioception for 0 minutes, including:      Topher participated in dynamic functional therapeutic activities to improve functional performance for 0  minutes, including:  Gait training to improve quad activation and heel to toe off    **ALL INTERVENTIONS BILLED AS THERAPEUTIC EXERCISE**      Home Exercises Provided and Patient Education Provided     Education provided:   - Prognosis, activity modification, goals for therapy, role of therapy for care, exercises/HEP    Written Home Exercises Provided: Patient instructed to cont prior HEP.  Exercises were reviewed and Topherwas able to demonstrate them prior to the end of the session.  Topher demonstrated good  understanding of the education provided.     See EMR under Patient Instructions for exercises provided prior visit.      Assessment     Patient tolerated treatment well. Did not experience pain or discomfort with interventions. He lacks full extension bilaterally with a 3 deg asymmetry left to right. Will continue to work on knee range of motion. Patient was educated on activity modification and gait, focusing on full knee extension and increased quad activation at heel contact.     Topher Is progressing well towards his goals.   Pt prognosis is Good.     Pt will continue to benefit from skilled outpatient physical therapy to address the deficits listed in the problem list box on initial evaluation, provide pt/family education and to maximize pt's level of independence in the home and community environment.     Pt's spiritual, cultural and educational needs considered and pt agreeable to plan of care and goals.    Anticipated barriers to physical therapy: none    Goals:   Short Term Goals: 8-10 weeks  1. Pt will be compliant with HEP 50% of prescribed amount.   2. The pt to demo improvement in left knee ROM to equal right knee ROM pain free   3.  The pt to demo good quad set with proper hyperextension moment of the left knee   4. The pt to demo ambualting  with elast restricitve AD without major compensatory pattern left knee for at least 20 feet      Long Term Goals: 36 weeks   1. Pt will be compliant with % of prescribed amount.   2. The pt to demo tolerance to a squat at or bellow parallel with uncompensated mechanics x10   3. The pt to demo strength of left LE within 10% of right LE as demo'd on the biodex machine   4. The pt will report full participation in ADLs and IADLs without restrictions related to left knee.        Plan     Plan of care Certification: 1/5/2022 to 9/1/2022.     Outpatient Physical Therapy 2 times weekly for 36 weeks to include the following interventions: Aquatic Therapy, Electrical Stimulation Russian/NMES, Gait Training, Manual Therapy, Moist Heat/ Ice, Neuromuscular Re-ed, Orthotic Management and Training, Patient Education, Self Care, Therapeutic Activities and Therapeutic Exercise.     Jose M Beth, PT, DPT

## 2022-01-19 ENCOUNTER — OFFICE VISIT (OUTPATIENT)
Dept: SPORTS MEDICINE | Facility: CLINIC | Age: 57
End: 2022-01-19
Payer: COMMERCIAL

## 2022-01-19 ENCOUNTER — CLINICAL SUPPORT (OUTPATIENT)
Dept: REHABILITATION | Facility: HOSPITAL | Age: 57
End: 2022-01-19
Payer: COMMERCIAL

## 2022-01-19 VITALS
DIASTOLIC BLOOD PRESSURE: 83 MMHG | HEIGHT: 70 IN | WEIGHT: 315 LBS | SYSTOLIC BLOOD PRESSURE: 139 MMHG | BODY MASS INDEX: 45.1 KG/M2 | HEART RATE: 70 BPM

## 2022-01-19 DIAGNOSIS — Z98.890 S/P ARTHROSCOPY OF LEFT KNEE: Primary | ICD-10-CM

## 2022-01-19 DIAGNOSIS — S83.242D ACUTE MEDIAL MENISCUS TEAR OF LEFT KNEE, SUBSEQUENT ENCOUNTER: Primary | ICD-10-CM

## 2022-01-19 DIAGNOSIS — R29.898 DECREASED STRENGTH INVOLVING KNEE JOINT: ICD-10-CM

## 2022-01-19 DIAGNOSIS — M25.662 DECREASED ROM OF LEFT KNEE: ICD-10-CM

## 2022-01-19 PROCEDURE — 1159F PR MEDICATION LIST DOCUMENTED IN MEDICAL RECORD: ICD-10-PCS | Mod: CPTII,S$GLB,, | Performed by: PHYSICIAN ASSISTANT

## 2022-01-19 PROCEDURE — 3079F PR MOST RECENT DIASTOLIC BLOOD PRESSURE 80-89 MM HG: ICD-10-PCS | Mod: CPTII,S$GLB,, | Performed by: PHYSICIAN ASSISTANT

## 2022-01-19 PROCEDURE — 3075F SYST BP GE 130 - 139MM HG: CPT | Mod: CPTII,S$GLB,, | Performed by: PHYSICIAN ASSISTANT

## 2022-01-19 PROCEDURE — 1160F RVW MEDS BY RX/DR IN RCRD: CPT | Mod: CPTII,S$GLB,, | Performed by: PHYSICIAN ASSISTANT

## 2022-01-19 PROCEDURE — 99024 PR POST-OP FOLLOW-UP VISIT: ICD-10-PCS | Mod: S$GLB,,, | Performed by: PHYSICIAN ASSISTANT

## 2022-01-19 PROCEDURE — 3075F PR MOST RECENT SYSTOLIC BLOOD PRESS GE 130-139MM HG: ICD-10-PCS | Mod: CPTII,S$GLB,, | Performed by: PHYSICIAN ASSISTANT

## 2022-01-19 PROCEDURE — 1159F MED LIST DOCD IN RCRD: CPT | Mod: CPTII,S$GLB,, | Performed by: PHYSICIAN ASSISTANT

## 2022-01-19 PROCEDURE — 3079F DIAST BP 80-89 MM HG: CPT | Mod: CPTII,S$GLB,, | Performed by: PHYSICIAN ASSISTANT

## 2022-01-19 PROCEDURE — 3008F BODY MASS INDEX DOCD: CPT | Mod: CPTII,S$GLB,, | Performed by: PHYSICIAN ASSISTANT

## 2022-01-19 PROCEDURE — 99999 PR PBB SHADOW E&M-EST. PATIENT-LVL III: ICD-10-PCS | Mod: PBBFAC,,, | Performed by: PHYSICIAN ASSISTANT

## 2022-01-19 PROCEDURE — 1160F PR REVIEW ALL MEDS BY PRESCRIBER/CLIN PHARMACIST DOCUMENTED: ICD-10-PCS | Mod: CPTII,S$GLB,, | Performed by: PHYSICIAN ASSISTANT

## 2022-01-19 PROCEDURE — 99024 POSTOP FOLLOW-UP VISIT: CPT | Mod: S$GLB,,, | Performed by: PHYSICIAN ASSISTANT

## 2022-01-19 PROCEDURE — 99999 PR PBB SHADOW E&M-EST. PATIENT-LVL III: CPT | Mod: PBBFAC,,, | Performed by: PHYSICIAN ASSISTANT

## 2022-01-19 PROCEDURE — 97110 THERAPEUTIC EXERCISES: CPT

## 2022-01-19 PROCEDURE — 3008F PR BODY MASS INDEX (BMI) DOCUMENTED: ICD-10-PCS | Mod: CPTII,S$GLB,, | Performed by: PHYSICIAN ASSISTANT

## 2022-01-19 NOTE — PROGRESS NOTES
Physical Therapy Daily Treatment Note     Name: Topher Cr  Clinic Number: [id number]    Therapy Diagnosis:   No diagnosis found.  Physician: Jaskaran Cho III, *    Visit Date: 1/19/2022    Physician Orders: PT Eval and Treat  Medical Diagnosis from Referral: S83.242A (ICD-10-CM) - Acute medial meniscus tear of left knee, initial encounter  Evaluation Date: 1/5/2022  Authorization Period Expiration: 01/12/2023  Plan of Care Expiration: 9/1/2022  Visit # / Visits authorized: 2/ 20  FOTO: 0/10    Time In: 1035  Time Out: 1140  Total Billable Time: 57 minutes    Precautions: Standard    Subjective     Pt reports: walking has been more difficult because he states he strained his L achilles 4 days ago after getting up from sitting at home. No trauma or subjective feelings of pop. Achilles pain has been steadily improving since. Patient also complaining of L medial thigh/groin tenderness and states he might have a hernia and has been dealing with this for a while. Otherwise knee has felt fine.    He was compliant with home exercise program.  Response to previous treatment: no adverse effects  Functional change: improved range of motion and function    Pain: 0/10 currently, 6/10 maximum  Location: left knee      Objective     Knee range of motion:   R: 0-8-115 (cannot reach full extension)  L: 0- (cannot reach full extension)      Topher received therapeutic exercises to develop strength, endurance and ROM for 57 minutes including:  LLLD heel prop 2x5# ankle weight x 10 min   Quad sets x40   Heel prop x 5 min - painful 8/10 pain   SAQ x30  SLR x30  Side-lying hip abduction 3x10  Belt assisted gastroc/hamstring stretch 3x10 with 3 sec hold  Stationary bicycle x10 min     Topher received the following manual therapy techniques: Joint mobilizations, Manual traction and Soft tissue Mobilization were applied to the: left knee for 0 minutes, including:  Knee extension hinge mobilization with  gastroc stretch - **painful  PF joint mobilizations medial/lateral/superior/inferior  Fat pad mobilization  Scar mobilization    Topher participated in neuromuscular re-education activities to improve: Balance, Coordination, Kinesthetic, Sense and Proprioception for 0 minutes, including:      Topher participated in dynamic functional therapeutic activities to improve functional performance for 0  minutes, including:      **ALL INTERVENTIONS BILLED AS THERAPEUTIC EXERCISE**      Home Exercises Provided and Patient Education Provided     Education provided:   - Prognosis, activity modification, goals for therapy, role of therapy for care, exercises/HEP    Written Home Exercises Provided: Patient instructed to cont prior HEP.  Exercises were reviewed and Topherwas able to demonstrate them prior to the end of the session.  Topher demonstrated good  understanding of the education provided.     See EMR under Patient Instructions for exercises provided prior visit.      Assessment     Patient was experiencing a high degree (8/10) of pain with passive knee extension. This improved after fat pad and patellar mobilizations. Passive knee extension is approaching symmetry while flexion remains more limited but improved by 10 degrees today. Patient was instructed to perform HEP before getting up from prolonged sitting to avoid acute pain.     Topher Is progressing well towards his goals.   Pt prognosis is Good.     Pt will continue to benefit from skilled outpatient physical therapy to address the deficits listed in the problem list box on initial evaluation, provide pt/family education and to maximize pt's level of independence in the home and community environment.     Pt's spiritual, cultural and educational needs considered and pt agreeable to plan of care and goals.    Anticipated barriers to physical therapy: none    Goals:   Short Term Goals: 8-10 weeks  1. Pt will be compliant with HEP 50% of prescribed amount.   2. The pt to demo  improvement in left knee ROM to equal right knee ROM pain free   3.  The pt to demo good quad set with proper hyperextension moment of the left knee   4. The pt to demo ambualting with elast restricitve AD without major compensatory pattern left knee for at least 20 feet      Long Term Goals: 36 weeks   1. Pt will be compliant with % of prescribed amount.   2. The pt to demo tolerance to a squat at or bellow parallel with uncompensated mechanics x10   3. The pt to demo strength of left LE within 10% of right LE as demo'd on the biodex machine   4. The pt will report full participation in ADLs and IADLs without restrictions related to left knee.        Plan     Plan of care Certification: 1/5/2022 to 9/1/2022.     Outpatient Physical Therapy 2 times weekly for 36 weeks to include the following interventions: Aquatic Therapy, Electrical Stimulation Russian/NMES, Gait Training, Manual Therapy, Moist Heat/ Ice, Neuromuscular Re-ed, Orthotic Management and Training, Patient Education, Self Care, Therapeutic Activities and Therapeutic Exercise.     Jose M Beth, PT, DPT

## 2022-01-20 NOTE — PROGRESS NOTES
CC: Left knee pain    History of present illness: Pt is here today for post-operative followup of knee arthroscopy.  he is doing well.  We have reviewed his findings and discussed plan of care and future treatment options.      He is doing well.   Weaned off crutches recently.   No issues reported.     DATE OF PROCEDURE: 01/04/2022  SURGEON:  Idalia Mclean M.D  PROCEDURE PERFORMED:   left  1. knee arthroscopic chondroplasty (CPT 61257)  2. knee arthroscopic medial (CPT 85823) meniscectomy   3. knee arthroscopic partial synovectomy/debridement (CPT 16948).   4. knee arthroscopic plica excision(CPT 16449).    5. Knee arthroscopic lysis of adhesions (CPT 45738)    In the patellofemoral compartment, there was chondral damage to:  Patella 5 x 3 mm grade 2  Chondroplasty was performed using arthroscopic shaver.     There was chondral damage to:  Medial femoral condyle 15 x 5 mm grade 3  Chondroplasty was performed using arthroscopic shaver.                                                                               PHYSICAL EXAMINATION:     Incision sites healed well  No evidence of any erythema, infection or induration  Range of motion 0-110 degrees contralateral knee 0-125  Minimal effusion  2+ DP pulse  No swelling, no calf tenderness  - Clari's sign  Negative medial joint line tenderness  Moderate quad atrophy                                                                                 ASSESSMENT:                                                                                                                                               1. Status post above, doing well.                                                                                                                               PLAN:                                                                                                                                                     1. Continue with PT.   He is limping some with ambulation. I  recommended resuming 1 crutch to assist weightbearing for another 1 week.   2. Emphasized quad function.  3. I have discussed return to activity in detail.  4.Patient will see us back at 6 week post-op corazon.                                    5. All questions were answered and patient should contact us if he  has any questions or concerns in the interim.

## 2022-01-26 ENCOUNTER — CLINICAL SUPPORT (OUTPATIENT)
Dept: REHABILITATION | Facility: HOSPITAL | Age: 57
End: 2022-01-26
Payer: COMMERCIAL

## 2022-01-26 DIAGNOSIS — M25.662 DECREASED ROM OF LEFT KNEE: ICD-10-CM

## 2022-01-26 DIAGNOSIS — R29.898 DECREASED STRENGTH INVOLVING KNEE JOINT: Primary | ICD-10-CM

## 2022-01-26 DIAGNOSIS — S83.242D ACUTE MEDIAL MENISCUS TEAR OF LEFT KNEE, SUBSEQUENT ENCOUNTER: ICD-10-CM

## 2022-01-26 PROCEDURE — 97110 THERAPEUTIC EXERCISES: CPT

## 2022-01-26 NOTE — PROGRESS NOTES
Physical Therapy Daily Treatment Note     Name: Topher Cr  Clinic Number: [id number]    Therapy Diagnosis:   No diagnosis found.  Physician: Jaskaran Cho III, *    Visit Date: 1/26/2022    Physician Orders: PT Eval and Treat  Medical Diagnosis from Referral: S83.242A (ICD-10-CM) - Acute medial meniscus tear of left knee, initial encounter  Evaluation Date: 1/5/2022  Authorization Period Expiration: 01/12/2023  Plan of Care Expiration: 9/1/2022  Visit # / Visits authorized: 3/ 20  FOTO: 0/10    Time In: 0950  Time Out: 1050  Total Billable Time: 57 minutes    Precautions: Standard     DOS: 1/4/2022  Surgeon: Caridad  Procedure:   left  1. knee arthroscopic chondroplasty   2. knee arthroscopic medial meniscectomy   3. knee arthroscopic partial synovectomy/debridement  4. knee arthroscopic plica excision  5. Knee arthroscopic lysis of adhesions    Subjective     Pt reports: L calf has continued to bother him, but better than last week. States no pain or discomfort in left knee except for when he is sleeping, but attributes this to sleeping on his right side, causing the LLE to turn in.     He was compliant with home exercise program.  Response to previous treatment: no adverse effects  Functional change: improved range of motion and function    Pain: 0/10 currently, 6/10 maximum  Location: left knee      Objective     Knee range of motion: symmetrical   R: 0-8-115 (cannot reach full extension)  L: 0-8-115 (cannot reach full extension)      Topher received therapeutic exercises to develop strength, endurance and ROM for 57 minutes including:  LLLD heel prop 2x5# ankle weight x 10 min   Standing DF calf stretch 4x20 sec hold  SLR 3x10 with 4# AW   Side-lying hip abduction 3x10 with 4# AW  Belt assisted hip flexor/quad stretch supine 4x20 sec hold  Squats with UE support at head of table 3x10   Range of motion re-assessment  Stationary bicycle x10 min     Topher received the following manual  therapy techniques: Joint mobilizations, Manual traction and Soft tissue Mobilization were applied to the: left knee for 0 minutes, including:  PF joint mobilizations medial/lateral/superior/inferior  Fat pad mobilization   Graston at lateral lower calf x 8 min  Graston knee incision scar sites x 3 min     Topher participated in neuromuscular re-education activities to improve: Balance, Coordination, Kinesthetic, Sense and Proprioception for 0 minutes, including:      Topher participated in dynamic functional therapeutic activities to improve functional performance for 0  minutes, including:      **ALL INTERVENTIONS BILLED AS THERAPEUTIC EXERCISE**      Home Exercises Provided and Patient Education Provided     Education provided:   - Prognosis, activity modification, goals for therapy, role of therapy for care, exercises/HEP    Written Home Exercises Provided: Patient instructed to cont prior HEP.  Exercises were reviewed and Topherwas able to demonstrate them prior to the end of the session.  Topher demonstrated good  understanding of the education provided.     See EMR under Patient Instructions for exercises provided prior visit.      Assessment     Patient responded well to STM Graston to left lateral calf and knee scarring sites, demonstrating improved gait and subjective decreased pain and improved range of motion. He demonstrated improvement of knee range of motion which is now symmetrical. Squats with thigh parallel to ground were a little uncomfortable at the medial knee, but this disappeared with slightly less knee flexion. Focus moving forward will be on strengthening and conditioning. Patient was having hamstring cramps with basic interventions.     Topher Is progressing well towards his goals.   Pt prognosis is Good.     Pt will continue to benefit from skilled outpatient physical therapy to address the deficits listed in the problem list box on initial evaluation, provide pt/family education and to maximize  pt's level of independence in the home and community environment.     Pt's spiritual, cultural and educational needs considered and pt agreeable to plan of care and goals.    Anticipated barriers to physical therapy: none    Goals:   Short Term Goals: 8-10 weeks  1. Pt will be compliant with HEP 50% of prescribed amount.   2. The pt to demo improvement in left knee ROM to equal right knee ROM pain free   3.  The pt to demo good quad set with proper hyperextension moment of the left knee   4. The pt to demo ambualting with elast restricitve AD without major compensatory pattern left knee for at least 20 feet      Long Term Goals: 36 weeks   1. Pt will be compliant with % of prescribed amount.   2. The pt to demo tolerance to a squat at or bellow parallel with uncompensated mechanics x10   3. The pt to demo strength of left LE within 10% of right LE as demo'd on the biodex machine   4. The pt will report full participation in ADLs and IADLs without restrictions related to left knee.        Plan     - SLR with weight  - squats  - lateral band walks  - heel raises  - shuttle leg press    Plan of care Certification: 1/5/2022 to 9/1/2022.     Outpatient Physical Therapy 2 times weekly for 36 weeks to include the following interventions: Aquatic Therapy, Electrical Stimulation Russian/NMES, Gait Training, Manual Therapy, Moist Heat/ Ice, Neuromuscular Re-ed, Orthotic Management and Training, Patient Education, Self Care, Therapeutic Activities and Therapeutic Exercise.     Jose M Beth, PT, DPT

## 2022-01-27 ENCOUNTER — IMMUNIZATION (OUTPATIENT)
Dept: INTERNAL MEDICINE | Facility: CLINIC | Age: 57
End: 2022-01-27
Payer: COMMERCIAL

## 2022-01-27 DIAGNOSIS — Z23 NEED FOR VACCINATION: Primary | ICD-10-CM

## 2022-01-27 PROCEDURE — 0004A COVID-19, MRNA, LNP-S, PF, 30 MCG/0.3 ML DOSE VACCINE: CPT | Mod: CV19,PBBFAC | Performed by: INTERNAL MEDICINE

## 2022-02-03 ENCOUNTER — CLINICAL SUPPORT (OUTPATIENT)
Dept: REHABILITATION | Facility: HOSPITAL | Age: 57
End: 2022-02-03
Payer: COMMERCIAL

## 2022-02-03 DIAGNOSIS — R29.898 DECREASED STRENGTH INVOLVING KNEE JOINT: ICD-10-CM

## 2022-02-03 DIAGNOSIS — M25.662 DECREASED ROM OF LEFT KNEE: Primary | ICD-10-CM

## 2022-02-03 DIAGNOSIS — S83.242D ACUTE MEDIAL MENISCUS TEAR OF LEFT KNEE, SUBSEQUENT ENCOUNTER: ICD-10-CM

## 2022-02-03 PROCEDURE — 97110 THERAPEUTIC EXERCISES: CPT

## 2022-02-03 NOTE — PROGRESS NOTES
Physical Therapy Daily Treatment Note     Name: Topher Cr  Clinic Number: [id number]    Therapy Diagnosis:   No diagnosis found.  Physician: Jaskaran Cho III, *    Visit Date: 2/3/2022    Physician Orders: PT Eval and Treat  Medical Diagnosis from Referral: S83.242A (ICD-10-CM) - Acute medial meniscus tear of left knee, initial encounter  Evaluation Date: 1/5/2022  Authorization Period Expiration: 01/12/2023  Plan of Care Expiration: 9/1/2022  Visit # / Visits authorized: 4/ 20  FOTO: 0/10    Time In: 1300  Time Out: 1400  Total Billable Time: 57 minutes    Precautions: Standard     DOS: 1/4/2022  Surgeon: Caridad  Procedure:   left  1. knee arthroscopic chondroplasty   2. knee arthroscopic medial meniscectomy   3. knee arthroscopic partial synovectomy/debridement  4. knee arthroscopic plica excision  5. Knee arthroscopic lysis of adhesions    Subjective     Pt reports:  No pain coming in today. He states pain can get up to 6/10 when waking up due to lying on his right side when sleeping or pivoting on LLE. He is still off of work and spends most of the day watching TV.     He was compliant with home exercise program.  Response to previous treatment: no adverse effects  Functional change: improved range of motion and function    Pain: 0/10 currently, 6/10 maximum  Location: left knee      Objective   4 weeks s/p procedure     Knee range of motion: symmetrical   R: 0-8-115 (cannot reach full extension)  L: 0-8-115 (cannot reach full extension)      Topher received therapeutic exercises to develop strength, endurance and ROM for 25 minutes including:  Stationary bicycle x10 min   LLLD heel prop 2x5# ankle weight x 10 min   Standing DF calf stretch 4x20 sec hold  SLR 3x10 with 4# AW   Side-lying hip abduction 3x10 with 4# AW   Shuttle leg press DL 2 bands 3x10  DL Calf raise 3x10     Topher received the following manual therapy techniques: Joint mobilizations, Manual traction and Soft tissue  "Mobilization were applied to the: left knee for 10 minutes, including:  PF joint mobilizations medial/lateral/superior/inferior  Fat pad mobilization   Extension hinge mobilizations    Topher participated in neuromuscular re-education activities to improve: Balance, Coordination, Kinesthetic, Sense and Proprioception for 0 minutes, including:      Topher participated in dynamic functional therapeutic activities to improve functional performance for 20 minutes, including:  Squats to chair with UE assistance 3x10  Lateral step off 8" 3x10   SLS trampoline ball rebound 2x10 B     **ALL INTERVENTIONS BILLED AS THERAPEUTIC EXERCISE**      Home Exercises Provided and Patient Education Provided     Education provided:   - Prognosis, activity modification, goals for therapy, role of therapy for care, exercises/HEP    Written Home Exercises Provided: Patient instructed to cont prior HEP.  Exercises were reviewed and Topherdenisse able to demonstrate them prior to the end of the session.  Topher demonstrated good  understanding of the education provided.     See EMR under Patient Instructions for exercises provided prior visit.      Assessment     Patient did well today. No pain or complaints with all interventions. We progressed strengthening into CKC. Patient demonstrated good carryover between sets and good motor control, requiring occasional cueing for technique. Had considerable challenge with SLS. Will continue to progress functional activities.     Topher Is progressing well towards his goals.   Pt prognosis is Good.     Pt will continue to benefit from skilled outpatient physical therapy to address the deficits listed in the problem list box on initial evaluation, provide pt/family education and to maximize pt's level of independence in the home and community environment.     Pt's spiritual, cultural and educational needs considered and pt agreeable to plan of care and goals.    Anticipated barriers to physical therapy: " none    Goals:   Short Term Goals: 8-10 weeks  1. Pt will be compliant with HEP 50% of prescribed amount.   2. The pt to demo improvement in left knee ROM to equal right knee ROM pain free   3.  The pt to demo good quad set with proper hyperextension moment of the left knee   4. The pt to demo ambualting with elast restricitve AD without major compensatory pattern left knee for at least 20 feet      Long Term Goals: 36 weeks   1. Pt will be compliant with % of prescribed amount.   2. The pt to demo tolerance to a squat at or bellow parallel with uncompensated mechanics x10   3. The pt to demo strength of left LE within 10% of right LE as demo'd on the biodex machine   4. The pt will report full participation in ADLs and IADLs without restrictions related to left knee.        Plan     - SLR with weight  - squats  - lateral band walks  - heel raises  - shuttle leg press    Plan of care Certification: 1/5/2022 to 9/1/2022.     Outpatient Physical Therapy 2 times weekly for 36 weeks to include the following interventions: Aquatic Therapy, Electrical Stimulation Russian/NMES, Gait Training, Manual Therapy, Moist Heat/ Ice, Neuromuscular Re-ed, Orthotic Management and Training, Patient Education, Self Care, Therapeutic Activities and Therapeutic Exercise.     Jose M Beth, PT, DPT

## 2022-02-10 ENCOUNTER — CLINICAL SUPPORT (OUTPATIENT)
Dept: REHABILITATION | Facility: HOSPITAL | Age: 57
End: 2022-02-10
Payer: COMMERCIAL

## 2022-02-10 DIAGNOSIS — S83.242D ACUTE MEDIAL MENISCUS TEAR OF LEFT KNEE, SUBSEQUENT ENCOUNTER: ICD-10-CM

## 2022-02-10 DIAGNOSIS — R29.898 DECREASED STRENGTH INVOLVING KNEE JOINT: Primary | ICD-10-CM

## 2022-02-10 PROCEDURE — 97110 THERAPEUTIC EXERCISES: CPT

## 2022-02-10 NOTE — PROGRESS NOTES
"                            Physical Therapy Daily Treatment Note     Name: Topher Cr  Clinic Number: [id number]    Therapy Diagnosis:   No diagnosis found.  Physician: Jaskaran Cho III, *    Visit Date: 2/10/2022    Physician Orders: PT Eval and Treat  Medical Diagnosis from Referral: S83.242A (ICD-10-CM) - Acute medial meniscus tear of left knee, initial encounter  Evaluation Date: 1/5/2022  Authorization Period Expiration: 01/12/2023  Plan of Care Expiration: 9/1/2022  Visit # / Visits authorized: 5/ 20  FOTO: 0/10    Time In: 1300  Time Out: 1400  Total Billable Time: 54 minutes    Precautions: Standard     DOS: 1/4/2022  Surgeon: Caridad  Procedure:   left  1. knee arthroscopic chondroplasty   2. knee arthroscopic medial meniscectomy   3. knee arthroscopic partial synovectomy/debridement  4. knee arthroscopic plica excision  5. Knee arthroscopic lysis of adhesions    Subjective     Pt reports:  No pain coming in today. He states mild pain when standing after prolonged sitting but this resolves on its own.     He was compliant with home exercise program.  Response to previous treatment: no adverse effects  Functional change: improved range of motion and function    Pain: 0/10 currently, 6/10 maximum  Location: left knee      Objective   5 weeks 3 days s/p procedure     Knee range of motion: symmetrical   R: 0-8-115 (cannot reach full extension)  L: 0-8-115 (cannot reach full extension)      Topher received therapeutic exercises to develop strength, endurance and ROM for 40 minutes including:    Stationary bicycle L2 x10 min   SLR 3x10 with 4# AW   Side-lying hip abduction 3x10 with 4# AW   Squats UE support 2x10  Squats Green theraband around knees 2x10  Squats with green theraband and 2x10# DB 3x10  Lateral heel taps off 8" box 3x10 B  Lateral banded walks green theraband 3x20 ft <->    NP:  LLLD heel prop 2x5# ankle weight x 10 min  Standing DF calf stretch 4x20 sec hold   Shuttle leg press DL 2 bands 3x10  DL " "Calf raise 3x10     Topher received the following manual therapy techniques: Joint mobilizations, Manual traction and Soft tissue Mobilization were applied to the: left knee for 14 minutes, including:  PF joint mobilizations medial/lateral/superior/inferior  Fat pad mobilization   Extension hinge mobilizations    Topher participated in neuromuscular re-education activities to improve: Balance, Coordination, Kinesthetic, Sense and Proprioception for 0 minutes, including:      Topher participated in dynamic functional therapeutic activities to improve functional performance for 0 minutes, including:    NP:  Squats to chair with UE assistance 3x10  Lateral step off 8" 3x10   SLS trampoline ball rebound 2x10 B     **ALL INTERVENTIONS BILLED AS THERAPEUTIC EXERCISE**      Home Exercises Provided and Patient Education Provided     Education provided:   - Prognosis, activity modification, goals for therapy, role of therapy for care, exercises/HEP    Written Home Exercises Provided: Patient instructed to cont prior HEP.  Exercises were reviewed and Topherwas able to demonstrate them prior to the end of the session.  Topher demonstrated good  understanding of the education provided.     See EMR under Patient Instructions for exercises provided prior visit.      Assessment     Patient did well today. He demos improved strength but shows difficulty supporting weight during eccentric step offs, using UE for support and decreased motor control. Patient requires min/mod cueing for exercise execution. Will cont to progress CKC strengthening.     Topher Is progressing well towards his goals.   Pt prognosis is Good.     Pt will continue to benefit from skilled outpatient physical therapy to address the deficits listed in the problem list box on initial evaluation, provide pt/family education and to maximize pt's level of independence in the home and community environment.     Pt's spiritual, cultural and educational needs considered and pt " agreeable to plan of care and goals.    Anticipated barriers to physical therapy: none    Goals:   Short Term Goals: 8-10 weeks  1. Pt will be compliant with HEP 50% of prescribed amount.   2. The pt to demo improvement in left knee ROM to equal right knee ROM pain free   3.  The pt to demo good quad set with proper hyperextension moment of the left knee   4. The pt to demo ambualting with elast restricitve AD without major compensatory pattern left knee for at least 20 feet      Long Term Goals: 36 weeks   1. Pt will be compliant with % of prescribed amount.   2. The pt to demo tolerance to a squat at or bellow parallel with uncompensated mechanics x10   3. The pt to demo strength of left LE within 10% of right LE as demo'd on the biodex machine   4. The pt will report full participation in ADLs and IADLs without restrictions related to left knee.        Plan     - SLR with weight  - squats  - lateral band walks  - heel raises  - shuttle leg press    Plan of care Certification: 1/5/2022 to 9/1/2022.     Outpatient Physical Therapy 2 times weekly for 36 weeks to include the following interventions: Aquatic Therapy, Electrical Stimulation Russian/NMES, Gait Training, Manual Therapy, Moist Heat/ Ice, Neuromuscular Re-ed, Orthotic Management and Training, Patient Education, Self Care, Therapeutic Activities and Therapeutic Exercise.     Jose M Beth, PT, DPT

## 2022-02-17 ENCOUNTER — CLINICAL SUPPORT (OUTPATIENT)
Dept: REHABILITATION | Facility: HOSPITAL | Age: 57
End: 2022-02-17
Payer: COMMERCIAL

## 2022-02-17 DIAGNOSIS — M25.662 DECREASED ROM OF LEFT KNEE: Primary | ICD-10-CM

## 2022-02-17 DIAGNOSIS — R29.898 DECREASED STRENGTH INVOLVING KNEE JOINT: ICD-10-CM

## 2022-02-17 DIAGNOSIS — S83.242D ACUTE MEDIAL MENISCUS TEAR OF LEFT KNEE, SUBSEQUENT ENCOUNTER: ICD-10-CM

## 2022-02-17 PROCEDURE — 97110 THERAPEUTIC EXERCISES: CPT

## 2022-02-17 NOTE — PROGRESS NOTES
"                            Physical Therapy Progress Note     Name: Topher Cr  Clinic Number: [id number]    Therapy Diagnosis:   No diagnosis found.  Physician: Jaskaran Cho III, *    Visit Date: 2/17/2022    Physician Orders: PT Eval and Treat  Medical Diagnosis from Referral: S83.242A (ICD-10-CM) - Acute medial meniscus tear of left knee, initial encounter  Evaluation Date: 1/5/2022  Authorization Period Expiration: 01/12/2023  Plan of Care Expiration: 9/1/2022  Visit # / Visits authorized: 6/ 20  FOTO: 0/10    Time In: 0953  Time Out: 1100  Total Billable Time: 58 minutes    Precautions: Standard     DOS: 1/4/2022  Surgeon: Caridad  Procedure:   left  1. knee arthroscopic chondroplasty   2. knee arthroscopic medial meniscectomy   3. knee arthroscopic partial synovectomy/debridement  4. knee arthroscopic plica excision  5. Knee arthroscopic lysis of adhesions    Subjective     Pt reports:  Mild pain at the medial L knee. He states sleeping on his right side at night causes this pain but that is what he is used to. He has not had any new CHADD or increase in activity.     He was compliant with home exercise program.  Response to previous treatment: no adverse effects  Functional change: improved range of motion and function    Pain: 3/10   Location: left knee      Objective   6 weeks 2 days s/p procedure     Knee range of motion: symmetrical   R: 0-8-115 (cannot reach full extension)  L: 0-8-115 (cannot reach full extension)      Topher received therapeutic exercises to develop strength, endurance and ROM for 40 minutes including:    Stationary bicycle L2 x10 min   Belt assisted quad/hip flexor stretch 4x20"  TKE purple monster band 3x10   Squats Green theraband around knees 10# heavy ball 3x10  GTB lateral walks 3x30 ft <->  3 quadrant heel taps 2x6 B   Pelvic tilt in supine x15  Supine open books x10 B   Cat camel x10   Quadruped thoracic extension x10     NP:  LLLD heel prop 2x5# ankle weight x 10 min  Standing " "DF calf stretch 4x20 sec hold   Shuttle leg press DL 2 bands 3x10  DL Calf raise 3x10   Squats with green theraband and 2x10# DB 3x10  Lateral heel taps off 8" box 3x10 B  Lateral heel taps off 8" box 3x10 B    Topher received the following manual therapy techniques: Joint mobilizations, Manual traction and Soft tissue Mobilization were applied to the: left knee for 10 minutes, including:  PF joint mobilizations medial/lateral/superior/inferior  Fat pad mobilization   Extension hinge mobilizations    Topher participated in neuromuscular re-education activities to improve: Balance, Coordination, Kinesthetic, Sense and Proprioception for 0 minutes, including:      Topher participated in dynamic functional therapeutic activities to improve functional performance for 0 minutes, including:      **ALL INTERVENTIONS BILLED AS THERAPEUTIC EXERCISE**      Home Exercises Provided and Patient Education Provided     Education provided:   - Prognosis, activity modification, goals for therapy, role of therapy for care, exercises/HEP    Written Home Exercises Provided: Patient instructed to cont prior HEP.  Exercises were reviewed and Topherwas able to demonstrate them prior to the end of the session.  Topher demonstrated good  understanding of the education provided.     See EMR under Patient Instructions for exercises provided prior visit.      Assessment     Patient tolerated all interventions well, had minimal to no discomfort. Described his low back tightening up towards the end of session. Patient was given lumbar/thoracic stretches to remedy and instructed on posture and weight management. Patient's gait improved throughout the session. He was adequately challenged with today's interventions.     Topher Is progressing well towards his goals.   Pt prognosis is Good.     Pt will continue to benefit from skilled outpatient physical therapy to address the deficits listed in the problem list box on initial evaluation, provide pt/family " education and to maximize pt's level of independence in the home and community environment.     Pt's spiritual, cultural and educational needs considered and pt agreeable to plan of care and goals.    Anticipated barriers to physical therapy: none    Goals:   Short Term Goals: 8-10 weeks  1. Pt will be compliant with HEP 50% of prescribed amount.   2. The pt to demo improvement in left knee ROM to equal right knee ROM pain free   3.  The pt to demo good quad set with proper hyperextension moment of the left knee   4. The pt to demo ambualting with elast restricitve AD without major compensatory pattern left knee for at least 20 feet      Long Term Goals: 36 weeks   1. Pt will be compliant with % of prescribed amount.   2. The pt to demo tolerance to a squat at or bellow parallel with uncompensated mechanics x10   3. The pt to demo strength of left LE within 10% of right LE as demo'd on the biodex machine   4. The pt will report full participation in ADLs and IADLs without restrictions related to left knee.        Plan     - SLR with weight  - squats  - lateral band walks  - heel raises  - shuttle leg press    Plan of care Certification: 1/5/2022 to 9/1/2022.     Outpatient Physical Therapy 2 times weekly for 36 weeks to include the following interventions: Aquatic Therapy, Electrical Stimulation Russian/NMES, Gait Training, Manual Therapy, Moist Heat/ Ice, Neuromuscular Re-ed, Orthotic Management and Training, Patient Education, Self Care, Therapeutic Activities and Therapeutic Exercise.     Jose M Beth, PT, DPT

## 2022-02-18 ENCOUNTER — OFFICE VISIT (OUTPATIENT)
Dept: SPORTS MEDICINE | Facility: CLINIC | Age: 57
End: 2022-02-18
Payer: COMMERCIAL

## 2022-02-18 VITALS — HEIGHT: 70 IN | WEIGHT: 315 LBS | BODY MASS INDEX: 45.1 KG/M2

## 2022-02-18 DIAGNOSIS — Z98.890 S/P ARTHROSCOPY OF LEFT KNEE: Primary | ICD-10-CM

## 2022-02-18 PROCEDURE — 1159F PR MEDICATION LIST DOCUMENTED IN MEDICAL RECORD: ICD-10-PCS | Mod: CPTII,S$GLB,, | Performed by: ORTHOPAEDIC SURGERY

## 2022-02-18 PROCEDURE — 99024 POSTOP FOLLOW-UP VISIT: CPT | Mod: S$GLB,,, | Performed by: ORTHOPAEDIC SURGERY

## 2022-02-18 PROCEDURE — 99999 PR PBB SHADOW E&M-EST. PATIENT-LVL III: CPT | Mod: PBBFAC,,, | Performed by: ORTHOPAEDIC SURGERY

## 2022-02-18 PROCEDURE — 3008F BODY MASS INDEX DOCD: CPT | Mod: CPTII,S$GLB,, | Performed by: ORTHOPAEDIC SURGERY

## 2022-02-18 PROCEDURE — 3008F PR BODY MASS INDEX (BMI) DOCUMENTED: ICD-10-PCS | Mod: CPTII,S$GLB,, | Performed by: ORTHOPAEDIC SURGERY

## 2022-02-18 PROCEDURE — 1159F MED LIST DOCD IN RCRD: CPT | Mod: CPTII,S$GLB,, | Performed by: ORTHOPAEDIC SURGERY

## 2022-02-18 PROCEDURE — 99999 PR PBB SHADOW E&M-EST. PATIENT-LVL III: ICD-10-PCS | Mod: PBBFAC,,, | Performed by: ORTHOPAEDIC SURGERY

## 2022-02-18 PROCEDURE — 99024 PR POST-OP FOLLOW-UP VISIT: ICD-10-PCS | Mod: S$GLB,,, | Performed by: ORTHOPAEDIC SURGERY

## 2022-02-18 NOTE — PROGRESS NOTES
CC: Left knee pain    History of present illness: Pt is here today for post-operative followup of knee arthroscopy.  he is doing well.  We have reviewed his findings and discussed plan of care and future treatment options.      Patient has been attending physical therapy at the [facility] location, working with Jose M.    He notes that he still has pain at night and when he first gets up from sitting     SANE post op: 60  SANE pre op: 50    DATE OF PROCEDURE: 01/04/2022  SURGEON:  Idalia Mclean M.D  PROCEDURE PERFORMED:   left  1. knee arthroscopic chondroplasty (CPT 43008)  2. knee arthroscopic medial (CPT 35989) meniscectomy   3. knee arthroscopic partial synovectomy/debridement (CPT 32737).   4. knee arthroscopic plica excision(CPT 40540).    5. Knee arthroscopic lysis of adhesions (CPT 66999)    In the patellofemoral compartment, there was chondral damage to:  Patella 5 x 3 mm grade 2  Chondroplasty was performed using arthroscopic shaver.     There was chondral damage to:  Medial femoral condyle 15 x 5 mm grade 3  Chondroplasty was performed using arthroscopic shaver.                                                                               PHYSICAL EXAMINATION:     Incision sites healed well  No evidence of any erythema, infection or induration  Range of motion 0-120 degrees (contralateral knee 0-125)  No effusion  2+ DP pulse  No swelling, no calf tenderness  - Clari's sign  Mild medial joint line tenderness  Mild quad atrophy                                                                                 ASSESSMENT:                                                                                                                                               1. Status post above, doing well.                                                                                                                               PLAN:                                                                                                                                                      1. Continue with PT.   2. Emphasized quad function.  3. I have discussed return to activity in detail, patient is ok to return to desk duty on 2/21/22, anticipated return to full duty will be determined at follow up appointment.  4.Patient will see us back in 6 weeks to determine return to full duty status                                    5. All questions were answered and patient should contact us if he  has any questions or concerns in the interim.     6. Discussed possible Euflexxa injections in the future

## 2022-02-18 NOTE — LETTER
Patient: Topher Cr   YOB: 1965   Clinic Number: [id number]   Today's Date: February 18, 2022        Certificate to Return to Work     Topher was seen by Idalia Mclean MD on 2/18/2022.    Topher can return to work on 2/21/2022 preforming desk duties only. He will follow up on 3/30/2022 to determine if he is able to return to full duty.       If you have any questions or concerns, please feel free to contact the office at [phone].    Thank you.    Idalia Mclean MD        Signature:

## 2022-02-18 NOTE — LETTER
Patient: Topher Cr   YOB: 1965   Clinic Number: [id number]   Today's Date: February 18, 2022        Certificate to Return to Work     Topher was seen by Idalia Mclean MD on 2/18/2022.    Topher can return to work on 2/21/2022 preforming desk duties only.      If you have any questions or concerns, please feel free to contact the office at [phone].    Thank you.    Idalia Mclean MD        Signature:

## 2022-02-23 ENCOUNTER — TELEPHONE (OUTPATIENT)
Dept: REHABILITATION | Facility: HOSPITAL | Age: 57
End: 2022-02-23
Payer: COMMERCIAL

## 2022-02-28 ENCOUNTER — CLINICAL SUPPORT (OUTPATIENT)
Dept: REHABILITATION | Facility: HOSPITAL | Age: 57
End: 2022-02-28
Payer: COMMERCIAL

## 2022-02-28 DIAGNOSIS — M25.662 DECREASED ROM OF LEFT KNEE: Primary | ICD-10-CM

## 2022-02-28 DIAGNOSIS — S83.242D ACUTE MEDIAL MENISCUS TEAR OF LEFT KNEE, SUBSEQUENT ENCOUNTER: ICD-10-CM

## 2022-02-28 DIAGNOSIS — R29.898 DECREASED STRENGTH INVOLVING KNEE JOINT: ICD-10-CM

## 2022-03-01 NOTE — PROGRESS NOTES
"                              Physical Therapy Progress Note     Name: Topher Cr  Clinic Number: [id number]    Therapy Diagnosis:   Encounter Diagnoses   Name Primary?    Decreased ROM of left knee Yes    Decreased strength involving knee joint     Acute medial meniscus tear of left knee, subsequent encounter      Physician: Jaskaran Cho III, *    Visit Date: 2/28/2022    Physician Orders: PT Eval and Treat  Medical Diagnosis from Referral: S83.242A (ICD-10-CM) - Acute medial meniscus tear of left knee, initial encounter  Evaluation Date: 1/5/2022  Authorization Period Expiration: 01/12/2023  Plan of Care Expiration: 9/1/2022  Visit # / Visits authorized: 7/ 20  FOTO: 0/10    Time In: 1100  Time Out: 1159  Total Billable Time: 57 minutes    Precautions: Standard     DOS: 1/4/2022  Surgeon: Caridad  Procedure:   left  1. knee arthroscopic chondroplasty   2. knee arthroscopic medial meniscectomy   3. knee arthroscopic partial synovectomy/debridement  4. knee arthroscopic plica excision  5. Knee arthroscopic lysis of adhesions    Subjective     Pt reports:  Continued Mild pain at the medial L knee with initation of ambulation and weight bearing, but this dissipates with time. Also trouble when lying on his side to sleep.     He was compliant with home exercise program.  Response to previous treatment: no adverse effects  Functional change: improved range of motion and function    Pain: 3/10   Location: left knee      Objective   8 weeks 0 days s/p procedure     Knee range of motion: symmetrical   R: 0-8-115 (cannot reach full extension)  L: 0-8-115 (cannot reach full extension)      Topher received therapeutic exercises to develop strength, endurance and ROM for 37 minutes including:    Stationary bicycle L4 x10 min   Shuttle leg press SL 3x10  DL Calf raise on shuttle 3x10   Lateral heel taps off 8" box 3x10 B  Squats with 8# heavy ball 3x10      Topher received the following manual therapy techniques: Joint " mobilizations, Manual traction and Soft tissue Mobilization were applied to the: left knee for 10 minutes, including:  PF joint mobilizations medial/lateral/superior/inferior  Fat pad mobilization   Extension hinge mobilizations    Topher participated in neuromuscular re-education activities to improve: Balance, Coordination, Kinesthetic, Sense and Proprioception for 10 minutes, including:  SL balance with ball throw to rebounder 3x10 B     Topher participated in dynamic functional therapeutic activities to improve functional performance for 0 minutes, including:      **ALL INTERVENTIONS BILLED AS THERAPEUTIC EXERCISE**      Home Exercises Provided and Patient Education Provided     Education provided:   - Prognosis, activity modification, goals for therapy, role of therapy for care, exercises/HEP    Written Home Exercises Provided: Patient instructed to cont prior HEP.  Exercises were reviewed and Topherwas able to demonstrate them prior to the end of the session.  Topher demonstrated good  understanding of the education provided.     See EMR under Patient Instructions for exercises provided prior visit.      Assessment     Patient tolerated all interventions well. Had minimal pain at medial knee with increased knee flexion angles in weight bearing. Discussed changing his sleeping position to improve symptoms and performing table interventions in bed prior to getting up.     Topher Is progressing well towards his goals.   Pt prognosis is Good.     Pt will continue to benefit from skilled outpatient physical therapy to address the deficits listed in the problem list box on initial evaluation, provide pt/family education and to maximize pt's level of independence in the home and community environment.     Pt's spiritual, cultural and educational needs considered and pt agreeable to plan of care and goals.    Anticipated barriers to physical therapy: none    Goals:   Short Term Goals: 8-10 weeks  1. Pt will be compliant with  HEP 50% of prescribed amount.   2. The pt to demo improvement in left knee ROM to equal right knee ROM pain free   3.  The pt to demo good quad set with proper hyperextension moment of the left knee   4. The pt to demo ambualting with elast restricitve AD without major compensatory pattern left knee for at least 20 feet      Long Term Goals: 36 weeks   1. Pt will be compliant with % of prescribed amount.   2. The pt to demo tolerance to a squat at or bellow parallel with uncompensated mechanics x10   3. The pt to demo strength of left LE within 10% of right LE as demo'd on the biodex machine   4. The pt will report full participation in ADLs and IADLs without restrictions related to left knee.        Plan     - SLR with weight  - squats  - lateral band walks  - heel raises  - shuttle leg press    Plan of care Certification: 1/5/2022 to 9/1/2022.     Outpatient Physical Therapy 2 times weekly for 36 weeks to include the following interventions: Aquatic Therapy, Electrical Stimulation Russian/NMES, Gait Training, Manual Therapy, Moist Heat/ Ice, Neuromuscular Re-ed, Orthotic Management and Training, Patient Education, Self Care, Therapeutic Activities and Therapeutic Exercise.     Jose M Beth, PT, DPT

## 2022-03-08 ENCOUNTER — CLINICAL SUPPORT (OUTPATIENT)
Dept: REHABILITATION | Facility: HOSPITAL | Age: 57
End: 2022-03-08
Payer: COMMERCIAL

## 2022-03-08 DIAGNOSIS — R29.898 DECREASED STRENGTH INVOLVING KNEE JOINT: Primary | ICD-10-CM

## 2022-03-08 DIAGNOSIS — M25.662 DECREASED ROM OF LEFT KNEE: ICD-10-CM

## 2022-03-08 DIAGNOSIS — S83.242D ACUTE MEDIAL MENISCUS TEAR OF LEFT KNEE, SUBSEQUENT ENCOUNTER: ICD-10-CM

## 2022-03-08 PROCEDURE — 97110 THERAPEUTIC EXERCISES: CPT

## 2022-03-08 NOTE — PROGRESS NOTES
"                                Physical Therapy Progress Note     Name: Topher Cr  Clinic Number: [id number]    Therapy Diagnosis:   Encounter Diagnoses   Name Primary?    Decreased strength involving knee joint Yes    Decreased ROM of left knee     Acute medial meniscus tear of left knee, subsequent encounter      Physician: Jaskaran Cho III, *    Visit Date: 3/8/2022    Physician Orders: PT Eval and Treat  Medical Diagnosis from Referral: S83.242A (ICD-10-CM) - Acute medial meniscus tear of left knee, initial encounter  Evaluation Date: 1/5/2022  Authorization Period Expiration: 01/12/2023  Plan of Care Expiration: 9/1/2022  Visit # / Visits authorized: 8/ 20  FOTO: 0/10    Time In: 1400  Time Out: 1500  Total Billable Time: 57 minutes    Precautions: Standard     DOS: 1/4/2022  Surgeon: Caridad  Procedure: left knee  1. knee arthroscopic chondroplasty   2. knee arthroscopic medial meniscectomy   3. knee arthroscopic partial synovectomy/debridement  4. knee arthroscopic plica excision  5. Knee arthroscopic lysis of adhesions    Subjective     Pt reports: medial knee pain is improved and he feels like he is walking better.     He was compliant with home exercise program.  Response to previous treatment: no adverse effects  Functional change: improved range of motion and function    Pain: 3/10   Location: left knee      Objective   9 weeks 1 days s/p procedure     Knee range of motion: symmetrical   R: 0-8-115 (cannot reach full extension)  L: 0-8-115 (cannot reach full extension)      Topher received therapeutic exercises to develop strength, endurance and ROM for 43 minutes including:    Stationary bicycle L4 x10 min  DL squat with 14# ball to 20" 2x10  SL squat to 26" eccentric only > eccentric+concentric 4x8  Step up to 12" box 3x6 B   GTB lateral walks x30 ft <->   Shuttle leg press SL 3x10  DL Calf raise on shuttle 3x10       Topher received the following manual therapy techniques: Joint mobilizations, " Manual traction and Soft tissue Mobilization were applied to the: left knee for 0 minutes, including:  PF joint mobilizations medial/lateral/superior/inferior  Fat pad mobilization   Extension hinge mobilizations    Topher participated in neuromuscular re-education activities to improve: Balance, Coordination, Kinesthetic, Sense and Proprioception for 12 minutes, including:  SL balance ball throw 3x8 B     Topher participated in dynamic functional therapeutic activities to improve functional performance for 0 minutes, including:      **ALL INTERVENTIONS BILLED AS THERAPEUTIC EXERCISE**      Home Exercises Provided and Patient Education Provided     Education provided:   - Prognosis, activity modification, goals for therapy, role of therapy for care, exercises/HEP    Written Home Exercises Provided: Patient instructed to cont prior HEP.  Exercises were reviewed and Topherwas able to demonstrate them prior to the end of the session.  Topher demonstrated good  understanding of the education provided.     See EMR under Patient Instructions for exercises provided prior visit.      Assessment     Patient tolerated all interventions well, denying pain throughout. He is progressing with strength and endurance, requiring fewer rest breaks between sets. We are strengthening through greater knee flexion angles. Will cont to progress as tolerated.     Topher Is progressing well towards his goals.   Pt prognosis is Good.     Pt will continue to benefit from skilled outpatient physical therapy to address the deficits listed in the problem list box on initial evaluation, provide pt/family education and to maximize pt's level of independence in the home and community environment.     Pt's spiritual, cultural and educational needs considered and pt agreeable to plan of care and goals.    Anticipated barriers to physical therapy: none    Goals:   Short Term Goals: 8-10 weeks  1. Pt will be compliant with HEP 50% of prescribed amount.   2. The  pt to demo improvement in left knee ROM to equal right knee ROM pain free   3.  The pt to demo good quad set with proper hyperextension moment of the left knee   4. The pt to demo ambualting with elast restricitve AD without major compensatory pattern left knee for at least 20 feet      Long Term Goals: 36 weeks   1. Pt will be compliant with % of prescribed amount.   2. The pt to demo tolerance to a squat at or bellow parallel with uncompensated mechanics x10   3. The pt to demo strength of left LE within 10% of right LE as demo'd on the biodex machine   4. The pt will report full participation in ADLs and IADLs without restrictions related to left knee.        Plan       Plan of care Certification: 1/5/2022 to 9/1/2022.     Outpatient Physical Therapy 2 times weekly for 36 weeks to include the following interventions: Aquatic Therapy, Electrical Stimulation Russian/NMES, Gait Training, Manual Therapy, Moist Heat/ Ice, Neuromuscular Re-ed, Orthotic Management and Training, Patient Education, Self Care, Therapeutic Activities and Therapeutic Exercise.     Jose M Beth, PT, DPT

## 2022-03-30 ENCOUNTER — OFFICE VISIT (OUTPATIENT)
Dept: SPORTS MEDICINE | Facility: CLINIC | Age: 57
End: 2022-03-30
Payer: COMMERCIAL

## 2022-03-30 VITALS
BODY MASS INDEX: 45.1 KG/M2 | DIASTOLIC BLOOD PRESSURE: 83 MMHG | HEART RATE: 81 BPM | HEIGHT: 70 IN | SYSTOLIC BLOOD PRESSURE: 138 MMHG | WEIGHT: 315 LBS

## 2022-03-30 DIAGNOSIS — M17.12 ARTHRITIS OF LEFT KNEE: Primary | ICD-10-CM

## 2022-03-30 PROCEDURE — 3008F BODY MASS INDEX DOCD: CPT | Mod: CPTII,S$GLB,, | Performed by: ORTHOPAEDIC SURGERY

## 2022-03-30 PROCEDURE — 3079F DIAST BP 80-89 MM HG: CPT | Mod: CPTII,S$GLB,, | Performed by: ORTHOPAEDIC SURGERY

## 2022-03-30 PROCEDURE — 99024 POSTOP FOLLOW-UP VISIT: CPT | Mod: S$GLB,,, | Performed by: ORTHOPAEDIC SURGERY

## 2022-03-30 PROCEDURE — 3008F PR BODY MASS INDEX (BMI) DOCUMENTED: ICD-10-PCS | Mod: CPTII,S$GLB,, | Performed by: ORTHOPAEDIC SURGERY

## 2022-03-30 PROCEDURE — 99999 PR PBB SHADOW E&M-EST. PATIENT-LVL III: ICD-10-PCS | Mod: PBBFAC,,, | Performed by: ORTHOPAEDIC SURGERY

## 2022-03-30 PROCEDURE — 3075F PR MOST RECENT SYSTOLIC BLOOD PRESS GE 130-139MM HG: ICD-10-PCS | Mod: CPTII,S$GLB,, | Performed by: ORTHOPAEDIC SURGERY

## 2022-03-30 PROCEDURE — 3079F PR MOST RECENT DIASTOLIC BLOOD PRESSURE 80-89 MM HG: ICD-10-PCS | Mod: CPTII,S$GLB,, | Performed by: ORTHOPAEDIC SURGERY

## 2022-03-30 PROCEDURE — 3075F SYST BP GE 130 - 139MM HG: CPT | Mod: CPTII,S$GLB,, | Performed by: ORTHOPAEDIC SURGERY

## 2022-03-30 PROCEDURE — 99999 PR PBB SHADOW E&M-EST. PATIENT-LVL III: CPT | Mod: PBBFAC,,, | Performed by: ORTHOPAEDIC SURGERY

## 2022-03-30 PROCEDURE — 99024 PR POST-OP FOLLOW-UP VISIT: ICD-10-PCS | Mod: S$GLB,,, | Performed by: ORTHOPAEDIC SURGERY

## 2022-03-30 NOTE — PROGRESS NOTES
CC: Left knee pain    History of present illness: Pt is here today for post-operative followup of knee arthroscopy.  he is doing well.  We have reviewed his findings and discussed plan of care and future treatment options.      Patient has been attending physical therapy at the [facility] location, working with Jose M.    He notes that he still has pain at night and when he first gets up from sitting     SANE post op: 70  SANE pre op: 50    DATE OF PROCEDURE: 01/04/2022  SURGEON:  Idalia Mclean M.D  PROCEDURE PERFORMED:   left  1. knee arthroscopic chondroplasty (CPT 39947)  2. knee arthroscopic medial (CPT 70750) meniscectomy   3. knee arthroscopic partial synovectomy/debridement (CPT 94143).   4. knee arthroscopic plica excision(CPT 18765).    5. Knee arthroscopic lysis of adhesions (CPT 58331)    In the patellofemoral compartment, there was chondral damage to:  Patella 5 x 3 mm grade 2  Chondroplasty was performed using arthroscopic shaver.     There was chondral damage to:  Medial femoral condyle 15 x 5 mm grade 3  Chondroplasty was performed using arthroscopic shaver.                                                                               PHYSICAL EXAMINATION:     Incision sites healed well  No evidence of any erythema, infection or induration  Range of motion 0-125 degrees (contralateral knee 0-125)  No effusion  2+ DP pulse  No swelling, no calf tenderness  - Clari's sign  Mild medial joint line tenderness  Mild quad atrophy                                                                                 ASSESSMENT:                                                                                                                                               1. Status post above, doing well.                                                                                                                               PLAN:                                                                                                                                                      1. Continue with PT.   2. Emphasized quad function.  3. I have discussed return to activity in detail, patient is ok to full duty on 3/31/22  4.Patient will see us back PRN                                    5. All questions were answered and patient should contact us if he  has any questions or concerns in the interim.     6. Discussed possible Euflexxa injections in the future

## 2022-04-11 ENCOUNTER — OFFICE VISIT (OUTPATIENT)
Dept: PRIMARY CARE CLINIC | Facility: CLINIC | Age: 57
End: 2022-04-11
Payer: COMMERCIAL

## 2022-04-11 VITALS
HEIGHT: 70 IN | SYSTOLIC BLOOD PRESSURE: 134 MMHG | BODY MASS INDEX: 45.1 KG/M2 | HEART RATE: 66 BPM | DIASTOLIC BLOOD PRESSURE: 78 MMHG | OXYGEN SATURATION: 99 % | WEIGHT: 315 LBS | TEMPERATURE: 98 F

## 2022-04-11 DIAGNOSIS — E66.01 CLASS 3 SEVERE OBESITY DUE TO EXCESS CALORIES WITHOUT SERIOUS COMORBIDITY WITH BODY MASS INDEX (BMI) OF 45.0 TO 49.9 IN ADULT: ICD-10-CM

## 2022-04-11 DIAGNOSIS — M54.9 DORSALGIA: Primary | ICD-10-CM

## 2022-04-11 PROCEDURE — 3078F PR MOST RECENT DIASTOLIC BLOOD PRESSURE < 80 MM HG: ICD-10-PCS | Mod: CPTII,S$GLB,, | Performed by: FAMILY MEDICINE

## 2022-04-11 PROCEDURE — 1160F RVW MEDS BY RX/DR IN RCRD: CPT | Mod: CPTII,S$GLB,, | Performed by: FAMILY MEDICINE

## 2022-04-11 PROCEDURE — 99999 PR PBB SHADOW E&M-EST. PATIENT-LVL IV: CPT | Mod: PBBFAC,,, | Performed by: FAMILY MEDICINE

## 2022-04-11 PROCEDURE — 3008F PR BODY MASS INDEX (BMI) DOCUMENTED: ICD-10-PCS | Mod: CPTII,S$GLB,, | Performed by: FAMILY MEDICINE

## 2022-04-11 PROCEDURE — 99999 PR PBB SHADOW E&M-EST. PATIENT-LVL IV: ICD-10-PCS | Mod: PBBFAC,,, | Performed by: FAMILY MEDICINE

## 2022-04-11 PROCEDURE — 1159F PR MEDICATION LIST DOCUMENTED IN MEDICAL RECORD: ICD-10-PCS | Mod: CPTII,S$GLB,, | Performed by: FAMILY MEDICINE

## 2022-04-11 PROCEDURE — 3075F PR MOST RECENT SYSTOLIC BLOOD PRESS GE 130-139MM HG: ICD-10-PCS | Mod: CPTII,S$GLB,, | Performed by: FAMILY MEDICINE

## 2022-04-11 PROCEDURE — 3008F BODY MASS INDEX DOCD: CPT | Mod: CPTII,S$GLB,, | Performed by: FAMILY MEDICINE

## 2022-04-11 PROCEDURE — 3078F DIAST BP <80 MM HG: CPT | Mod: CPTII,S$GLB,, | Performed by: FAMILY MEDICINE

## 2022-04-11 PROCEDURE — 3075F SYST BP GE 130 - 139MM HG: CPT | Mod: CPTII,S$GLB,, | Performed by: FAMILY MEDICINE

## 2022-04-11 PROCEDURE — 1159F MED LIST DOCD IN RCRD: CPT | Mod: CPTII,S$GLB,, | Performed by: FAMILY MEDICINE

## 2022-04-11 PROCEDURE — 99214 OFFICE O/P EST MOD 30 MIN: CPT | Mod: S$GLB,,, | Performed by: FAMILY MEDICINE

## 2022-04-11 PROCEDURE — 1160F PR REVIEW ALL MEDS BY PRESCRIBER/CLIN PHARMACIST DOCUMENTED: ICD-10-PCS | Mod: CPTII,S$GLB,, | Performed by: FAMILY MEDICINE

## 2022-04-11 PROCEDURE — 99214 PR OFFICE/OUTPT VISIT, EST, LEVL IV, 30-39 MIN: ICD-10-PCS | Mod: S$GLB,,, | Performed by: FAMILY MEDICINE

## 2022-04-11 RX ORDER — METHOCARBAMOL 500 MG/1
500 TABLET, FILM COATED ORAL 4 TIMES DAILY
Qty: 40 TABLET | Refills: 0 | Status: SHIPPED | OUTPATIENT
Start: 2022-04-11 | End: 2022-04-21

## 2022-04-11 RX ORDER — DICLOFENAC SODIUM 75 MG/1
75 TABLET, DELAYED RELEASE ORAL 2 TIMES DAILY
Qty: 30 TABLET | Refills: 0 | Status: SHIPPED | OUTPATIENT
Start: 2022-04-11 | End: 2022-05-09

## 2022-05-04 ENCOUNTER — PATIENT MESSAGE (OUTPATIENT)
Dept: SPORTS MEDICINE | Facility: CLINIC | Age: 57
End: 2022-05-04
Payer: COMMERCIAL

## 2022-05-04 DIAGNOSIS — G89.29 CHRONIC PAIN OF LEFT KNEE: Primary | ICD-10-CM

## 2022-05-04 DIAGNOSIS — M25.562 CHRONIC PAIN OF LEFT KNEE: Primary | ICD-10-CM

## 2022-05-06 NOTE — PROGRESS NOTES
CC: Left knee pain    56 y.o. Male who presents as a new patient to me.  Complaint is chronic predominantly medially based left knee pain.  Symptoms started atraumatically last Fall. MRI showed a horizontal cleavage tear of the medial meniscus with chondromalacia.  The patient underwent arthroscopic partial medial meniscectomy and chondroplasty with my colleague Dr. Idalia Mclean in January 2022. He has completed formal physical therapy and continues to have pain.  Bothers him on a daily basis.  States overall no better compared to before surgery. SANE 25. Additionally has some chronic low back pain recently treated with Diclofenac and Robaxin.  The Diclofenac he takes once every other day and this seems to really help with the knee.  Denies radicular symptoms.  The patient works in law enforcement.  Current pain is duty limiting.  BMI 46.     No prior injections.    Recent arthroscopic findings included grade 3 wear of the medial femoral condyle and grade 2 wear of the patella.    REVIEW OF SYSTEMS:   Constitution: Negative. Negative for chills, fever and night sweats.    Hematologic/Lymphatic: Negative for bleeding problem. Does not bruise/bleed easily.   Skin: Negative for dry skin, itching and rash.   Musculoskeletal: Negative for falls. Positive for left knee pain and muscle weakness.     All other review of symptoms were reviewed and found to be noncontributory.     PAST MEDICAL HISTORY:   Past Medical History:   Diagnosis Date    Allergy     Blood clotting tendency     DJD (degenerative joint disease)     Hyperlipidemia     Low back strain, initial encounter 5/16/2019    Morbid obesity with BMI of 40.0-44.9, adult 3/29/2018    Obesity     Urticaria        PAST SURGICAL HISTORY:   Past Surgical History:   Procedure Laterality Date    ARTHROSCOPY OF KNEE Right     CHONDROPLASTY OF KNEE Left 1/4/2022    Procedure: CHONDROPLASTY, KNEE;  Surgeon: Idalia Mclean MD;  Location: [facility];  Service: Orthopedics;   Laterality: Left;    COLONOSCOPY N/A 2/15/2019    Procedure: COLONOSCOPY;  Surgeon: Boris Carey MD;  Location: [facility];  Service: Endoscopy;  Laterality: N/A;    ESOPHAGOGASTRODUODENOSCOPY N/A 2/15/2019    Procedure: ESOPHAGOGASTRODUODENOSCOPY (EGD);  Surgeon: Boris Carey MD;  Location: [facility] END[facility];  Service: Endoscopy;  Laterality: N/A;    KNEE ARTHROSCOPY W/ MENISCECTOMY Left 1/4/2022    Procedure: ARTHROSCOPY, KNEE, WITH MEDIAL MENISCECTOMY;  Surgeon: Idalia Mclean MD;  Location: [facility] OR;  Service: Orthopedics;  Laterality: Left;    KNEE ARTHROSCOPY W/ PLICA EXCISION Left 1/4/2022    Procedure: EXCISION, PLICA, KNEE, ARTHROSCOPIC;  Surgeon: Idalia Mclean MD;  Location: [facility] OR;  Service: Orthopedics;  Laterality: Left;    KNEE DEBRIDEMENT  1/4/2022    Procedure: DEBRIDEMENT, KNEE;  Surgeon: Idalia Mclean MD;  Location: [facility] OR;  Service: Orthopedics;;    KNEE SURGERY Right     SYNOVECTOMY OF KNEE Left 1/4/2022    Procedure: PARTIAL SYNOVECTOMY, KNEE;  Surgeon: Idalia Mclean MD;  Location: [facility] OR;  Service: Orthopedics;  Laterality: Left;       FAMILY HISTORY:   Family History   Problem Relation Age of Onset    Asthma Mother     Heart disease Father     Asthma Brother     Colon cancer Neg Hx     Esophageal cancer Neg Hx     Rectal cancer Neg Hx     Stomach cancer Neg Hx     Ulcerative colitis Neg Hx     Irritable bowel syndrome Neg Hx     Crohn's disease Neg Hx     Celiac disease Neg Hx     Melanoma Neg Hx        SOCIAL HISTORY:   Social History     Socioeconomic History    Marital status:    Tobacco Use    Smoking status: Never Smoker    Smokeless tobacco: Never Used   Substance and Sexual Activity    Alcohol use: No     Alcohol/week: 0.0 standard drinks    Drug use: No   Social History Narrative    Works in Law Enforcement, nonsmoker       MEDICATIONS:     Current Outpatient Medications:     diclofenac (VOLTAREN) 75 MG EC tablet, Take 1 tablet (75 mg total) by mouth 2 (two)  "times daily. w food and water for 5-15 days., Disp: 30 tablet, Rfl: 0    enoxaparin (LOVENOX) 40 mg/0.4 mL Syrg, Inject 0.4 mLs (40 mg total) into the skin once daily. For DVT prophylaxis. Start use on morning after surgery. (Patient not taking: No sig reported), Disp: 28 each, Rfl: 0    ferrous gluconate 324 mg (37.5 mg iron) Tab tablet, Take 1 tablet (324 mg total) by mouth daily with breakfast. (Patient not taking: No sig reported), Disp: 30 tablet, Rfl: 2    HYDROcodone-acetaminophen (NORCO)  mg per tablet, Take 1 tablet by mouth every 4-6 hours for pain. (Patient not taking: No sig reported), Disp: 15 tablet, Rfl: 0    ketoconazole (NIZORAL) 2 % shampoo, Wash face (especially around nose and beard area), ears with medicated shampoo at least 2-3x/week - let sit at least 5 minutes prior to rinsing (Patient not taking: No sig reported), Disp: 120 mL, Rfl: 5    promethazine (PHENERGAN) 25 MG tablet, Take 1 tablet (25 mg total) by mouth every 6 (six) hours as needed for Nausea. (Patient not taking: No sig reported), Disp: 12 tablet, Rfl: 0    traMADoL (ULTRAM) 50 mg tablet, Take 1-2 tablets ( mg total) by mouth every 6 (six) hours as needed for Pain. (Patient not taking: No sig reported), Disp: 21 tablet, Rfl: 0    triamcinolone acetonide 0.1% (KENALOG) 0.1 % cream, Apply topically 2 (two) times daily as needed (rash on neck). Avoid use on face, body folds, groin/genitalia. (Patient not taking: No sig reported), Disp: 45 g, Rfl: 3    ALLERGIES:   Review of patient's allergies indicates:   Allergen Reactions    Cucumber fruit extract Hives and Itching        PHYSICAL EXAMINATION:  BP (!) 160/92   Pulse 88   Ht 5' 11" (1.803 m)   Wt (!) 151 kg (332 lb 14.3 oz)   BMI 46.43 kg/m²   General: Well-developed well-nourished 56 y.o. malein no acute distress   Cardiovascular: Regular rhythm by palpation of distal pulse, normal color and temperature, no concerning varicosities on symptomatic side "   Lungs: No labored breathing or wheezing appreciated   Neuro: Alert and oriented ×3   Psychiatric: well oriented to person, place and time, demonstrates normal mood and affect   Skin: No rashes, lesions or ulcers, normal temperature, turgor, and texture on involved extremity    Ortho/SPM Exam  Examination of the left knee demonstrates intact extensor mechanism.  No swelling or effusion.  No signs of infection.  Well-healed arthroscopic portals.  5 degree loss of terminal extension, flexion to 120° with pain over terminal range.  Localizes medially.  Medial joint line pain to palpation.  Pain medially with forced flexion.  Negative peripatellar soft tissue tenderness.  Negative patellar grind test.  Reduced patellar mobility.  Positive Mansi's maneuver for pain medially.  Ligamentously stable.  Negative straight leg raise maneuver.  No pain with gentle internal and external rotation of the hip passively.    IMAGING:  X-rays including standing, weight bearing AP and flexion bilateral knees, LEFT knee lateral and sunrise views ordered and images reviewed by me show:    Well-maintained joint spaces.    ASSESSMENT:      ICD-10-CM ICD-9-CM   1. Chondromalacia of left knee  M94.262 717.7   2. Chronic pain of left knee  M25.562 719.46    G89.29 338.29   3. Morbid obesity with BMI of 45.0-49.9, adult  E66.01 278.01    Z68.42 V85.42     PLAN:     Complaint of chronic medially based left knee pain in the setting of known chondromalacia.  Treatment options discussed.  Conservative care recommended.  Discussed the need for weight loss and low-impact cardio exercise.  Discussed the potential benefit of injection therapy, corticosteroid versus viscosupplementation.  He states that the oral anti-inflammatory medication seems to help significantly.  He is taking the Diclofenac once every other day.  May increase to daily dosing as needed.  New script given. Discussed GI precautions.  Denies need for injection at this time.   Candidate for injection therapy in the future if pain persists or worsens despite this.  All questions answered.  Return to clinic as needed.    Procedures

## 2022-05-09 ENCOUNTER — HOSPITAL ENCOUNTER (OUTPATIENT)
Dept: RADIOLOGY | Facility: HOSPITAL | Age: 57
Discharge: HOME | End: 2022-05-09
Attending: ORTHOPAEDIC SURGERY
Payer: COMMERCIAL

## 2022-05-09 ENCOUNTER — OFFICE VISIT (OUTPATIENT)
Dept: SPORTS MEDICINE | Facility: CLINIC | Age: 57
End: 2022-05-09
Payer: COMMERCIAL

## 2022-05-09 VITALS
DIASTOLIC BLOOD PRESSURE: 92 MMHG | HEIGHT: 71 IN | SYSTOLIC BLOOD PRESSURE: 160 MMHG | WEIGHT: 315 LBS | HEART RATE: 88 BPM | BODY MASS INDEX: 44.1 KG/M2

## 2022-05-09 DIAGNOSIS — E66.01 MORBID OBESITY WITH BMI OF 45.0-49.9, ADULT: ICD-10-CM

## 2022-05-09 DIAGNOSIS — M94.262 CHONDROMALACIA OF LEFT KNEE: Primary | ICD-10-CM

## 2022-05-09 DIAGNOSIS — G89.29 CHRONIC PAIN OF LEFT KNEE: ICD-10-CM

## 2022-05-09 DIAGNOSIS — M25.562 CHRONIC PAIN OF LEFT KNEE: ICD-10-CM

## 2022-05-09 PROCEDURE — 99999 PR PBB SHADOW E&M-EST. PATIENT-LVL III: ICD-10-PCS | Mod: PBBFAC,,, | Performed by: ORTHOPAEDIC SURGERY

## 2022-05-09 PROCEDURE — 3077F SYST BP >= 140 MM HG: CPT | Mod: CPTII,S$GLB,, | Performed by: ORTHOPAEDIC SURGERY

## 2022-05-09 PROCEDURE — 3080F DIAST BP >= 90 MM HG: CPT | Mod: CPTII,S$GLB,, | Performed by: ORTHOPAEDIC SURGERY

## 2022-05-09 PROCEDURE — 99999 PR PBB SHADOW E&M-EST. PATIENT-LVL III: CPT | Mod: PBBFAC,,, | Performed by: ORTHOPAEDIC SURGERY

## 2022-05-09 PROCEDURE — 1159F PR MEDICATION LIST DOCUMENTED IN MEDICAL RECORD: ICD-10-PCS | Mod: CPTII,S$GLB,, | Performed by: ORTHOPAEDIC SURGERY

## 2022-05-09 PROCEDURE — 3008F PR BODY MASS INDEX (BMI) DOCUMENTED: ICD-10-PCS | Mod: CPTII,S$GLB,, | Performed by: ORTHOPAEDIC SURGERY

## 2022-05-09 PROCEDURE — 73562 XR KNEE ORTHO LEFT WITH FLEXION: ICD-10-PCS | Mod: 26,RT,, | Performed by: RADIOLOGY

## 2022-05-09 PROCEDURE — 73562 X-RAY EXAM OF KNEE 3: CPT | Mod: 26,RT,, | Performed by: RADIOLOGY

## 2022-05-09 PROCEDURE — 73564 X-RAY EXAM KNEE 4 OR MORE: CPT | Mod: 26,LT,, | Performed by: RADIOLOGY

## 2022-05-09 PROCEDURE — 3008F BODY MASS INDEX DOCD: CPT | Mod: CPTII,S$GLB,, | Performed by: ORTHOPAEDIC SURGERY

## 2022-05-09 PROCEDURE — 1159F MED LIST DOCD IN RCRD: CPT | Mod: CPTII,S$GLB,, | Performed by: ORTHOPAEDIC SURGERY

## 2022-05-09 PROCEDURE — 99214 PR OFFICE/OUTPT VISIT, EST, LEVL IV, 30-39 MIN: ICD-10-PCS | Mod: S$GLB,,, | Performed by: ORTHOPAEDIC SURGERY

## 2022-05-09 PROCEDURE — 3077F PR MOST RECENT SYSTOLIC BLOOD PRESSURE >= 140 MM HG: ICD-10-PCS | Mod: CPTII,S$GLB,, | Performed by: ORTHOPAEDIC SURGERY

## 2022-05-09 PROCEDURE — 73564 XR KNEE ORTHO LEFT WITH FLEXION: ICD-10-PCS | Mod: 26,LT,, | Performed by: RADIOLOGY

## 2022-05-09 PROCEDURE — 3080F PR MOST RECENT DIASTOLIC BLOOD PRESSURE >= 90 MM HG: ICD-10-PCS | Mod: CPTII,S$GLB,, | Performed by: ORTHOPAEDIC SURGERY

## 2022-05-09 PROCEDURE — 99214 OFFICE O/P EST MOD 30 MIN: CPT | Mod: S$GLB,,, | Performed by: ORTHOPAEDIC SURGERY

## 2022-05-09 PROCEDURE — 73562 X-RAY EXAM OF KNEE 3: CPT | Mod: TC,RT

## 2022-05-09 RX ORDER — DICLOFENAC SODIUM 75 MG/1
75 TABLET, DELAYED RELEASE ORAL 2 TIMES DAILY PRN
Qty: 60 TABLET | Refills: 2 | Status: SHIPPED | OUTPATIENT
Start: 2022-05-09 | End: 2022-12-22

## 2022-06-23 ENCOUNTER — PATIENT MESSAGE (OUTPATIENT)
Dept: PRIMARY CARE CLINIC | Facility: CLINIC | Age: 57
End: 2022-06-23
Payer: COMMERCIAL

## 2022-06-24 ENCOUNTER — TELEPHONE (OUTPATIENT)
Dept: PRIMARY CARE CLINIC | Facility: CLINIC | Age: 57
End: 2022-06-24
Payer: COMMERCIAL

## 2022-06-24 NOTE — TELEPHONE ENCOUNTER
----- Message from Tash Dickens sent at 6/24/2022 11:40 AM CDT -----  Contact: Mirian [phone]  Patient is returning a phone call.  Who left a message for the patient: Shruti Coe MA   Does patient know what this is regarding:  Covid   Would you like a call back, or a response through your [facility] portal?:  call back   Comments:

## 2022-06-24 NOTE — TELEPHONE ENCOUNTER
----- Message from Tash Dickens sent at 6/24/2022  9:00 AM CDT -----  Contact: Pt [phone]  1MEDICALADVICE     Patient is calling for Medical Advice regarding: Slight Fever and Headache     How long has patient had these symptoms: 2 days     Pharmacy name and phone#:  [facility] #[id number] - [facility] [address] [address] [place] [facility] [address]  Phone: [phone] Fax: [phone]        Would like response via Sparkb[employer]: call back     Comments: Positive home covid test 6/23/22

## 2022-06-28 ENCOUNTER — TELEPHONE (OUTPATIENT)
Dept: PRIMARY CARE CLINIC | Facility: CLINIC | Age: 57
End: 2022-06-28
Payer: COMMERCIAL

## 2022-06-28 NOTE — TELEPHONE ENCOUNTER
----- Message from Bebeto Ewing sent at 6/28/2022 12:20 PM CDT -----  Contact: pt [phone]    1MEDICALADVICE     Patient is calling for Medical Advice regarding: Covid positive  headache    How long has patient had these symptoms: 6 days    Pharmacy name and phone#:  [email or web address] #[id number] - [place] [address] [address]  Phone: [phone] Fax: [phone]        Would like response via [email or web address]:  call    Comments:

## 2022-08-03 ENCOUNTER — HOSPITAL ENCOUNTER (EMERGENCY)
Facility: HOSPITAL | Age: 57
Discharge: HOME | End: 2022-08-03
Attending: EMERGENCY MEDICINE
Payer: COMMERCIAL

## 2022-08-03 VITALS
HEIGHT: 71 IN | SYSTOLIC BLOOD PRESSURE: 142 MMHG | DIASTOLIC BLOOD PRESSURE: 76 MMHG | RESPIRATION RATE: 17 BRPM | BODY MASS INDEX: 44.1 KG/M2 | WEIGHT: 315 LBS | TEMPERATURE: 98 F | OXYGEN SATURATION: 97 % | HEART RATE: 64 BPM

## 2022-08-03 DIAGNOSIS — M25.562 LEFT KNEE PAIN: Primary | ICD-10-CM

## 2022-08-03 PROCEDURE — 99284 EMERGENCY DEPT VISIT MOD MDM: CPT | Mod: 25

## 2022-08-03 PROCEDURE — 63600175 PHARM REV CODE 636 W HCPCS: Performed by: EMERGENCY MEDICINE

## 2022-08-03 PROCEDURE — 99284 EMERGENCY DEPT VISIT MOD MDM: CPT | Mod: ,,, | Performed by: EMERGENCY MEDICINE

## 2022-08-03 PROCEDURE — 96372 THER/PROPH/DIAG INJ SC/IM: CPT | Performed by: EMERGENCY MEDICINE

## 2022-08-03 PROCEDURE — 99284 PR EMERGENCY DEPT VISIT,LEVEL IV: ICD-10-PCS | Mod: ,,, | Performed by: EMERGENCY MEDICINE

## 2022-08-03 RX ORDER — MELOXICAM 7.5 MG/1
7.5 TABLET ORAL DAILY
Qty: 20 TABLET | Refills: 0 | Status: SHIPPED | OUTPATIENT
Start: 2022-08-03 | End: 2022-12-22

## 2022-08-03 RX ORDER — KETOROLAC TROMETHAMINE 30 MG/ML
15 INJECTION, SOLUTION INTRAMUSCULAR; INTRAVENOUS
Status: COMPLETED | OUTPATIENT
Start: 2022-08-03 | End: 2022-08-03

## 2022-08-03 RX ADMIN — KETOROLAC TROMETHAMINE 15 MG: 30 INJECTION, SOLUTION INTRAMUSCULAR at 11:08

## 2022-08-03 NOTE — ED NOTES
Dr. Carranza notified that the Xray is resulted and the patient verbalized that the pain is not as bad as it was.  Awaiting discharge instructions at this time.

## 2022-08-03 NOTE — ED PROVIDER NOTES
Encounter Date: 8/3/2022    SCRIBE #1 NOTE: I, Jackie Clinton, am scribing for, and in the presence of,  Raysa Carranza Jr., MD. I have scribed the entire note.       History     Chief Complaint   Patient presents with    Knee Pain     Left knee pain, surgery in feb, pain since surgery, increased since going on vacation last week     Time patient was seen by the provider: 10:42 AM      The patient is a 57 y.o. male with PMHx including: DJD, HLD, obesity, , low back strain, left knee meniscus repair 01/2022 who presents to the ED with a complaint of left medial knee pain with onset 6 days ago.His wife also notes that his left leg has been turning inward when he walks since yesterday.The pain worsens when he turns and when he walks. He has taken tylenol, aleve, methocarbamol, and diclofenac which have not significantly improved his symptoms.  The patient additionally mentions day of beginning of pain was in [place] on vacation going zip lining and walking up and down a mountain.    The history is provided by the patient, medical records and the spouse. No  was used.     Review of patient's allergies indicates:   Allergen Reactions    Cucumber fruit extract Hives and Itching     Past Medical History:   Diagnosis Date    Allergy     Blood clotting tendency     DJD (degenerative joint disease)     Hyperlipidemia     Low back strain, initial encounter 5/16/2019    Morbid obesity with BMI of 40.0-44.9, adult 3/29/2018    Obesity     Urticaria      Past Surgical History:   Procedure Laterality Date    ARTHROSCOPY OF KNEE Right     CHONDROPLASTY OF KNEE Left 1/4/2022    Procedure: CHONDROPLASTY, KNEE;  Surgeon: Idalia Mclean MD;  Location: [facility] OR;  Service: Orthopedics;  Laterality: Left;    COLONOSCOPY N/A 2/15/2019    Procedure: COLONOSCOPY;  Surgeon: Boris Carey MD;  Location: [facility] ENDO;  Service: Endoscopy;  Laterality: N/A;    ESOPHAGOGASTRODUODENOSCOPY N/A 2/15/2019     Procedure: ESOPHAGOGASTRODUODENOSCOPY (EGD);  Surgeon: Boris Carey MD;  Location: [facility] ENDO;  Service: Endoscopy;  Laterality: N/A;    KNEE ARTHROSCOPY W/ MENISCECTOMY Left 1/4/2022    Procedure: ARTHROSCOPY, KNEE, WITH MEDIAL MENISCECTOMY;  Surgeon: Idalia Mclean MD;  Location: [facility] OR;  Service: Orthopedics;  Laterality: Left;    KNEE ARTHROSCOPY W/ PLICA EXCISION Left 1/4/2022    Procedure: EXCISION, PLICA, KNEE, ARTHROSCOPIC;  Surgeon: Idalia Mclean MD;  Location: [facility] OR;  Service: Orthopedics;  Laterality: Left;    KNEE DEBRIDEMENT  1/4/2022    Procedure: DEBRIDEMENT, KNEE;  Surgeon: Idalia Mclean MD;  Location: [facility] OR;  Service: Orthopedics;;    KNEE SURGERY Right     SYNOVECTOMY OF KNEE Left 1/4/2022    Procedure: PARTIAL SYNOVECTOMY, KNEE;  Surgeon: Idalia Mclean MD;  Location: [facility] OR;  Service: Orthopedics;  Laterality: Left;     Family History   Problem Relation Age of Onset    Asthma Mother     Heart disease Father     Asthma Brother     Colon cancer Neg Hx     Esophageal cancer Neg Hx     Rectal cancer Neg Hx     Stomach cancer Neg Hx     Ulcerative colitis Neg Hx     Irritable bowel syndrome Neg Hx     Crohn's disease Neg Hx     Celiac disease Neg Hx     Melanoma Neg Hx      Social History     Tobacco Use    Smoking status: Never Smoker    Smokeless tobacco: Never Used   Substance Use Topics    Alcohol use: No     Alcohol/week: 0.0 standard drinks    Drug use: No     Review of Systems   Constitutional: Negative for fever.   HENT: Negative for congestion.    Respiratory: Negative for cough and shortness of breath.    Cardiovascular: Negative for chest pain.   Gastrointestinal: Negative for abdominal pain and nausea.   Genitourinary: Negative for dysuria.   Musculoskeletal: Positive for arthralgias (left knee pain) and gait problem (left leg/foot turning inward with walking).   Skin: Negative for color change.   Neurological: Positive for weakness (left calf). Negative for dizziness.    Psychiatric/Behavioral: Negative.        Physical Exam     Initial Vitals [08/03/22 1019]   BP Pulse Resp Temp SpO2   (!) 183/90 75 18 98.4 °F (36.9 °C) 96 %      MAP       --         Physical Exam    Constitutional: Vital signs are normal. He appears well-developed and well-nourished.   HENT:   Head: Normocephalic and atraumatic.   Eyes: Conjunctivae are normal.   Neck: Trachea normal. Neck supple.   Normal range of motion.  Cardiovascular: Normal rate and normal pulses.   Pulmonary/Chest: No tachypnea. No respiratory distress.   Musculoskeletal:         General: No edema. Normal range of motion.      Cervical back: Normal range of motion and neck supple.      Comments: Mild tenderness to palpation of medial aspect of left knee.  Increased pain with valgus stress test without noted laxity.             Neurological: He is alert and oriented to person, place, and time. He has normal strength. GCS eye subscore is 4. GCS verbal subscore is 5. GCS motor subscore is 6.   Strength 5/5.  Sensation grossly intact to light touch.   Skin: Skin is warm and dry.   No swelling or overlying skin changes         ED Course   Procedures  Labs Reviewed   HIV 1 / 2 ANTIBODY   HEPATITIS C ANTIBODY          Imaging Results          X-Ray Knee 3 View Left (Final result)  Result time 08/03/22 12:02:06    Final result by Benito Nice MD (08/03/22 12:02:06)                 Impression:      See above      Electronically signed by: Benito Nice MD  Date:    08/03/2022  Time:    12:02             Narrative:    EXAMINATION:  XR KNEE 3 VIEW LEFT    CLINICAL HISTORY:  Pain in left knee    TECHNIQUE:  AP, lateral, and Merchant views of the left knee were performed.    COMPARISON:  N 05/09/2022 one    FINDINGS:  No fracture or dislocation.  No bone destruction identified.  Mild DJD                                 Medications   ketorolac injection 15 mg (15 mg Intramuscular Given 8/3/22 1107)     Medical Decision Making:   History:   Old Medical  Records: I decided to obtain old medical records.  Initial Assessment:   The patient is a 57 y.o. male with PMHx including: DJD, HLD, obesity, blood clotting tendency, low back strain, left knee meniscus repair 01/2022 who presents to the ED with a complaint of left knee pain with onset 6 days ago. Abnormal vitals include BP of 183/90 and weight of 325 lbs. PE noted for: Mild tenderness to palpation of medial aspect of left knee and increased pain with valgus stress test without noted laxity.  Differential Diagnosis:   DDx includes but is not limited to: MCL tear, dislocation, fracture, muscular strain, MCL sprain.  Clinical Tests:   Radiological Study: Ordered and Reviewed  ED Management:  Plan: Toradol, X-ray, reassess.          Scribe Attestation:   Scribe #1: I performed the above scribed service and the documentation accurately describes the services I performed. I attest to the accuracy of the note.                 Clinical Impression:   Final diagnoses:  [M25.562] Left knee pain - medial knee pain (Primary)      x-rays unremarkable.  Will DC home with follow-up instructions with orthopedist.  Patient understanding of plan safe for plan discharge home at this time.     ED Disposition Condition    Discharge Stable        ED Prescriptions     Medication Sig Dispense Start Date End Date Auth. Provider    meloxicam (MOBIC) 7.5 MG tablet Take 1 tablet (7.5 mg total) by mouth once daily. 20 tablet 8/3/2022  Raysa Carranza Jr., MD        Follow-up Information     Follow up With Specialties Details Why Contact Info    Solo Chacon MD Family Medicine In 1 week  [address] [facility] [address]  [phone]      [facility] - Emergency Dept Emergency Medicine  If symptoms worsen [address] [facility] [id number] [phone]           Raysa Carranza Jr., MD  08/03/22 1212

## 2022-08-03 NOTE — ED TRIAGE NOTES
+non traumatic left knee pain . Pt reports had meniscus repair back in January and pain has not improved. Pt reports pain when bearing weight. No obvious  deformity noted. No discoloration

## 2022-08-05 ENCOUNTER — OFFICE VISIT (OUTPATIENT)
Dept: ORTHOPEDICS | Facility: CLINIC | Age: 57
End: 2022-08-05
Payer: COMMERCIAL

## 2022-08-05 VITALS — WEIGHT: 315 LBS | BODY MASS INDEX: 44.1 KG/M2 | HEIGHT: 71 IN

## 2022-08-05 DIAGNOSIS — M25.562 ACUTE PAIN OF LEFT KNEE: ICD-10-CM

## 2022-08-05 DIAGNOSIS — M17.12 PRIMARY OSTEOARTHRITIS OF LEFT KNEE: Primary | ICD-10-CM

## 2022-08-05 PROCEDURE — 99999 PR PBB SHADOW E&M-EST. PATIENT-LVL III: CPT | Mod: PBBFAC,,, | Performed by: ORTHOPAEDIC SURGERY

## 2022-08-05 PROCEDURE — 3008F PR BODY MASS INDEX (BMI) DOCUMENTED: ICD-10-PCS | Mod: CPTII,S$GLB,, | Performed by: ORTHOPAEDIC SURGERY

## 2022-08-05 PROCEDURE — 99213 OFFICE O/P EST LOW 20 MIN: CPT | Mod: 25,S$GLB,, | Performed by: ORTHOPAEDIC SURGERY

## 2022-08-05 PROCEDURE — 1160F RVW MEDS BY RX/DR IN RCRD: CPT | Mod: CPTII,S$GLB,, | Performed by: ORTHOPAEDIC SURGERY

## 2022-08-05 PROCEDURE — 99999 PR PBB SHADOW E&M-EST. PATIENT-LVL III: ICD-10-PCS | Mod: PBBFAC,,, | Performed by: ORTHOPAEDIC SURGERY

## 2022-08-05 PROCEDURE — 1160F PR REVIEW ALL MEDS BY PRESCRIBER/CLIN PHARMACIST DOCUMENTED: ICD-10-PCS | Mod: CPTII,S$GLB,, | Performed by: ORTHOPAEDIC SURGERY

## 2022-08-05 PROCEDURE — 20610 LARGE JOINT ASPIRATION/INJECTION: L KNEE: ICD-10-PCS | Mod: LT,S$GLB,, | Performed by: ORTHOPAEDIC SURGERY

## 2022-08-05 PROCEDURE — 1159F PR MEDICATION LIST DOCUMENTED IN MEDICAL RECORD: ICD-10-PCS | Mod: CPTII,S$GLB,, | Performed by: ORTHOPAEDIC SURGERY

## 2022-08-05 PROCEDURE — 1159F MED LIST DOCD IN RCRD: CPT | Mod: CPTII,S$GLB,, | Performed by: ORTHOPAEDIC SURGERY

## 2022-08-05 PROCEDURE — 99213 PR OFFICE/OUTPT VISIT, EST, LEVL III, 20-29 MIN: ICD-10-PCS | Mod: 25,S$GLB,, | Performed by: ORTHOPAEDIC SURGERY

## 2022-08-05 PROCEDURE — 3008F BODY MASS INDEX DOCD: CPT | Mod: CPTII,S$GLB,, | Performed by: ORTHOPAEDIC SURGERY

## 2022-08-05 PROCEDURE — 20610 DRAIN/INJ JOINT/BURSA W/O US: CPT | Mod: LT,S$GLB,, | Performed by: ORTHOPAEDIC SURGERY

## 2022-08-05 RX ORDER — TRIAMCINOLONE ACETONIDE 40 MG/ML
40 INJECTION, SUSPENSION INTRA-ARTICULAR; INTRAMUSCULAR
Status: DISCONTINUED | OUTPATIENT
Start: 2022-08-05 | End: 2022-08-05 | Stop reason: HOSPADM

## 2022-08-05 RX ADMIN — TRIAMCINOLONE ACETONIDE 40 MG: 40 INJECTION, SUSPENSION INTRA-ARTICULAR; INTRAMUSCULAR at 08:08

## 2022-08-05 NOTE — PROGRESS NOTES
Subjective:      Patient ID: Topher Cr is a 57 y.o. male.    Chief Complaint: Consult (L Knee )    HPI     They have experienced problems with their left knee over the past several days. The patient denies relevant history of injury/aggravation.  Symptoms flared after recent travel to [place].  Patient reportedly did a lot of hiking.  He has relevant history of arthroscopic partial medial meniscectomy earlier this year.  Pain is located medially Associated symptoms include NA.  Symptoms are aggravated by walking. They have been treated with NSAIDs.   Symptoms have recently stayed the same. Ambulation reportedly has been impaired. Self care ADLs are not painful.     Review of Systems   Constitutional: Negative for fever and weight loss.   HENT: Negative for congestion.    Eyes: Negative for visual disturbance.   Cardiovascular: Negative for chest pain.   Respiratory: Negative for shortness of breath.    Hematologic/Lymphatic: Negative for bleeding problem. Does not bruise/bleed easily.   Skin: Negative for poor wound healing.   Musculoskeletal: Positive for joint pain.   Gastrointestinal: Negative for abdominal pain.   Genitourinary: Negative for dysuria.   Neurological: Negative for seizures.   Psychiatric/Behavioral: Negative for altered mental status.   Allergic/Immunologic: Negative for persistent infections.         Objective:      Ortho/SPM Exam      Left knee    The patient is not in acute distress.   Body habitus is obese   Sclera appear normal  No respiratory distress  The patient walks with a limp.  Resisted SLR negative.   The skin over the knee is intact.  Knee effusion 0  Tendernes is located medially  Range of motion- Flexion full, Extension full.   Ligament exam:   MCL trace laxity   Lachman intact              Post sag intact    LCL intact  Patellar apprehension negative.  Popliteal cyst negative  Patellar crepitation absent.  Flexion/pinch negative.  Pulses DP present, PT present.  Motor normal  5/5 strength in all tested muscle groups.   Sensory normal.    I reviewed the relevant imaging for the patient's condition:  Left knee radiographs today show slight medial narrowing      Assessment:       Encounter Diagnoses   Name Primary?    Acute pain of left knee     Primary osteoarthritis of left knee Yes   There recent acute flare could be due to localized bone contusion in the setting.        Plan:       Topher was seen today for consult.    Diagnoses and all orders for this visit:    Primary osteoarthritis of left knee    Acute pain of left knee  Comments:  medial knee pain  Orders:  -     Ambulatory referral/consult to Orthopedics    I explained my diagnostic impression and the reasoning behind it in detail, using layman's terms.  Models and/or pictures were used to help in the explanation.    Injection recommended and consent given    Patient advised to continue NSAIDs per PCP, rest and ice for 72 hours.    Weight loss recommended    This condition is likely to progress over time such that further intervention may be needed.  I explained this

## 2022-08-05 NOTE — PROCEDURES
Large Joint Aspiration/Injection: L knee    Date/Time: 8/5/2022 8:45 AM  Performed by: Pasha Carvalho MD  Authorized by: Pasha Carvalho MD     Location:  Knee  Site:  L knee  Medications:  40 mg triamcinolone acetonide 40 mg/mL     After obtaining verbal informed consent the patient's left knee was prepped aseptically and injected through an inferior lateral approach using 40 mg of triamcinolone and 1 cc of 1% plain Xylocaine.  The patient was warned about postinjection flare and how to manage it with ice, rest and over-the-counter analgesics.  They're advised to contact me for any severe, uncontrolled pain.

## 2022-08-09 NOTE — TELEPHONE ENCOUNTER
----- Message from Pam Salgado sent at 12/10/2019  9:01 AM CST -----  Contact: self/[phone]  Pt is calling to speak with someone in the office in regards to some forms that he dropped off about 2 weeks ago. Please call and advise.          Thank You   
Called patient to talk about forms he dropped off unable to leave message at this time  
Home/with Baby

## 2022-12-22 ENCOUNTER — OFFICE VISIT (OUTPATIENT)
Dept: PRIMARY CARE CLINIC | Facility: CLINIC | Age: 57
End: 2022-12-22
Payer: COMMERCIAL

## 2022-12-22 VITALS
OXYGEN SATURATION: 98 % | HEART RATE: 85 BPM | HEIGHT: 71 IN | SYSTOLIC BLOOD PRESSURE: 120 MMHG | DIASTOLIC BLOOD PRESSURE: 78 MMHG | WEIGHT: 315 LBS | BODY MASS INDEX: 44.1 KG/M2 | TEMPERATURE: 99 F

## 2022-12-22 DIAGNOSIS — R68.82 DECREASED LIBIDO: ICD-10-CM

## 2022-12-22 DIAGNOSIS — K42.9 UMBILICAL HERNIA WITHOUT OBSTRUCTION AND WITHOUT GANGRENE: ICD-10-CM

## 2022-12-22 DIAGNOSIS — F52.0 LACK OF LIBIDO: ICD-10-CM

## 2022-12-22 DIAGNOSIS — Z00.00 ROUTINE MEDICAL EXAM: Primary | ICD-10-CM

## 2022-12-22 DIAGNOSIS — E66.01 CLASS 3 SEVERE OBESITY DUE TO EXCESS CALORIES WITHOUT SERIOUS COMORBIDITY WITH BODY MASS INDEX (BMI) OF 45.0 TO 49.9 IN ADULT: ICD-10-CM

## 2022-12-22 DIAGNOSIS — K52.9 CHRONIC DIARRHEA: ICD-10-CM

## 2022-12-22 DIAGNOSIS — N52.9 ERECTILE DYSFUNCTION, UNSPECIFIED ERECTILE DYSFUNCTION TYPE: ICD-10-CM

## 2022-12-22 DIAGNOSIS — E73.9 LACTOSE INTOLERANCE: ICD-10-CM

## 2022-12-22 DIAGNOSIS — E29.1 HYPOGONADISM IN MALE: ICD-10-CM

## 2022-12-22 PROCEDURE — 3044F HG A1C LEVEL LT 7.0%: CPT | Mod: CPTII,S$GLB,, | Performed by: FAMILY MEDICINE

## 2022-12-22 PROCEDURE — 3078F DIAST BP <80 MM HG: CPT | Mod: CPTII,S$GLB,, | Performed by: FAMILY MEDICINE

## 2022-12-22 PROCEDURE — 1159F PR MEDICATION LIST DOCUMENTED IN MEDICAL RECORD: ICD-10-PCS | Mod: CPTII,S$GLB,, | Performed by: FAMILY MEDICINE

## 2022-12-22 PROCEDURE — 99999 PR PBB SHADOW E&M-EST. PATIENT-LVL IV: ICD-10-PCS | Mod: PBBFAC,,, | Performed by: FAMILY MEDICINE

## 2022-12-22 PROCEDURE — 3074F SYST BP LT 130 MM HG: CPT | Mod: CPTII,S$GLB,, | Performed by: FAMILY MEDICINE

## 2022-12-22 PROCEDURE — 99396 PR PREVENTIVE VISIT,EST,40-64: ICD-10-PCS | Mod: S$GLB,,, | Performed by: FAMILY MEDICINE

## 2022-12-22 PROCEDURE — 1159F MED LIST DOCD IN RCRD: CPT | Mod: CPTII,S$GLB,, | Performed by: FAMILY MEDICINE

## 2022-12-22 PROCEDURE — 99999 PR PBB SHADOW E&M-EST. PATIENT-LVL IV: CPT | Mod: PBBFAC,,, | Performed by: FAMILY MEDICINE

## 2022-12-22 PROCEDURE — 3044F PR MOST RECENT HEMOGLOBIN A1C LEVEL <7.0%: ICD-10-PCS | Mod: CPTII,S$GLB,, | Performed by: FAMILY MEDICINE

## 2022-12-22 PROCEDURE — 1160F PR REVIEW ALL MEDS BY PRESCRIBER/CLIN PHARMACIST DOCUMENTED: ICD-10-PCS | Mod: CPTII,S$GLB,, | Performed by: FAMILY MEDICINE

## 2022-12-22 PROCEDURE — 3008F BODY MASS INDEX DOCD: CPT | Mod: CPTII,S$GLB,, | Performed by: FAMILY MEDICINE

## 2022-12-22 PROCEDURE — 3078F PR MOST RECENT DIASTOLIC BLOOD PRESSURE < 80 MM HG: ICD-10-PCS | Mod: CPTII,S$GLB,, | Performed by: FAMILY MEDICINE

## 2022-12-22 PROCEDURE — 99396 PREV VISIT EST AGE 40-64: CPT | Mod: S$GLB,,, | Performed by: FAMILY MEDICINE

## 2022-12-22 PROCEDURE — 3074F PR MOST RECENT SYSTOLIC BLOOD PRESSURE < 130 MM HG: ICD-10-PCS | Mod: CPTII,S$GLB,, | Performed by: FAMILY MEDICINE

## 2022-12-22 PROCEDURE — 1160F RVW MEDS BY RX/DR IN RCRD: CPT | Mod: CPTII,S$GLB,, | Performed by: FAMILY MEDICINE

## 2022-12-22 PROCEDURE — 3008F PR BODY MASS INDEX (BMI) DOCUMENTED: ICD-10-PCS | Mod: CPTII,S$GLB,, | Performed by: FAMILY MEDICINE

## 2022-12-22 NOTE — PROGRESS NOTES
"    /78 (BP Location: Right arm, Patient Position: Sitting, BP Method: Large (Manual))   Pulse 85   Temp 98.5 °F (36.9 °C) (Oral)   Ht 5' 11" (1.803 m)   Wt (!) 154 kg (339 lb 8.1 oz)   SpO2 98%   BMI 47.35 kg/m²       ===========    Chief Complaint: No chief complaint on file.        Topher Cr is a 57 y.o. male here for    [facility]      Annual wellness exam.  Health maintenance reviewed patient in detail including screening labs and vaccines     Complains of  Gas and diarrhea when he eats dairy. Bowel movements are "explosive" and watery. No blood. No alternating constipation.    No nausea or vomiting.     The problem seems mostly associated with consumption of dairy but not always      Left ear discomfort also going on for about 2 wks. No fever or chills. No sore throat or cough or lad or other symptoms. No hearing loss. "Feels like something is draining." No pain with pulling on auricle.    He uses qtips but not daily.      Patient queried and denies any further complaints    SURGICAL AND MEDICAL HISTORY: updated and reviewed.  ALLERGIES updated and reviewed.  Review of patient's allergies indicates:   Allergen Reactions    Cucumber fruit extract Hives and Itching     CURRENT OUTPATIENT MEDICATIONS updated and reviewed  No current outpatient medications on file.    Review of Systems   Constitutional:  Negative for activity change, appetite change, chills, diaphoresis, fatigue, fever and unexpected weight change.   HENT:  Negative for congestion, ear discharge, ear pain, facial swelling, hearing loss, nosebleeds, postnasal drip, rhinorrhea, sinus pressure, sneezing, sore throat, tinnitus, trouble swallowing and voice change.    Eyes:  Negative for photophobia, pain, discharge, redness, itching and visual disturbance.   Respiratory:  Negative for cough, chest tightness, shortness of breath and wheezing.    Cardiovascular:  Negative for chest pain, palpitations and leg swelling.   Gastrointestinal:  " "Positive for diarrhea. Negative for abdominal distention, abdominal pain, anal bleeding, blood in stool, constipation, nausea, rectal pain and vomiting.   Endocrine: Negative for cold intolerance, heat intolerance, polydipsia, polyphagia and polyuria.   Genitourinary:  Negative for difficulty urinating, dysuria and flank pain.   Musculoskeletal:  Negative for arthralgias, back pain, joint swelling, myalgias and neck pain.   Skin:  Negative for rash.   Neurological:  Negative for dizziness, tremors, seizures, syncope, speech difficulty, weakness, light-headedness, numbness and headaches.   Psychiatric/Behavioral:  Negative for behavioral problems, confusion, decreased concentration, dysphoric mood, sleep disturbance and suicidal ideas. The patient is not nervous/anxious and is not hyperactive.      /78 (BP Location: Right arm, Patient Position: Sitting, BP Method: Large (Manual))   Pulse 85   Temp 98.5 °F (36.9 °C) (Oral)   Ht 5' 11" (1.803 m)   Wt (!) 154 kg (339 lb 8.1 oz)   SpO2 98%   BMI 47.35 kg/m²   Physical Exam  Vitals and nursing note reviewed.   Constitutional:       General: He is not in acute distress.     Appearance: Normal appearance. He is well-developed. He is not ill-appearing or toxic-appearing.   HENT:      Head: Normocephalic and atraumatic.      Right Ear: Tympanic membrane, ear canal and external ear normal.      Left Ear: Tympanic membrane, ear canal and external ear normal.      Nose: Nose normal.      Mouth/Throat:      Lips: Pink.      Mouth: Mucous membranes are moist.      Pharynx: No oropharyngeal exudate or posterior oropharyngeal erythema.   Eyes:      General: No scleral icterus.        Right eye: No discharge.         Left eye: No discharge.      Extraocular Movements: Extraocular movements intact.      Conjunctiva/sclera: Conjunctivae normal.   Cardiovascular:      Rate and Rhythm: Normal rate and regular rhythm.      Pulses: Normal pulses.      Heart sounds: Normal heart " sounds. No murmur heard.  Pulmonary:      Effort: Pulmonary effort is normal. No respiratory distress.      Breath sounds: Normal breath sounds. No wheezing or rales.   Abdominal:      General: Bowel sounds are normal. There is no distension.      Palpations: Abdomen is soft. There is no mass.      Tenderness: There is no abdominal tenderness. There is no right CVA tenderness, left CVA tenderness, guarding or rebound.      Hernia: No hernia is present.   Musculoskeletal:      Cervical back: Normal range of motion and neck supple. No rigidity or tenderness.   Lymphadenopathy:      Cervical: No cervical adenopathy.   Skin:     General: Skin is warm and dry.   Neurological:      General: No focal deficit present.      Mental Status: He is alert. Mental status is at baseline.   Psychiatric:         Mood and Affect: Mood normal.         Behavior: Behavior normal. Behavior is cooperative.       Diagnoses and all orders for this visit:    Routine medical exam  -     PSA, Screening; Future  -     CBC Without Differential; Future  -     Comprehensive Metabolic Panel; Future  -     Hemoglobin A1C; Future  -     Lipid Panel; Future  -     TSH; Future    Chronic diarrhea  -     Ambulatory referral/consult to Gastroenterology; Future    Decreased libido  -     TESTOSTERONE PANEL; Future    Hypogonadism in male  -     TESTOSTERONE PANEL; Future    Erectile dysfunction, unspecified erectile dysfunction type  -     TESTOSTERONE PANEL; Future    Lactose intolerance    Lack of libido    Class 3 severe obesity due to excess calories without serious comorbidity with body mass index (BMI) of 45.0 to 49.9 in adult    Umbilical hernia without obstruction and without gangrene      Needs significant weight loss by exercise and calorie restriction  Reviewed old pertinent medical records available.     All lab results personally reviewed and interpreted by me including last year, if available.        Solo Chacon MD

## 2022-12-23 ENCOUNTER — LAB VISIT (OUTPATIENT)
Dept: LAB | Facility: HOSPITAL | Age: 57
End: 2022-12-23
Attending: FAMILY MEDICINE
Payer: COMMERCIAL

## 2022-12-23 DIAGNOSIS — R68.82 DECREASED LIBIDO: ICD-10-CM

## 2022-12-23 DIAGNOSIS — E29.1 HYPOGONADISM IN MALE: ICD-10-CM

## 2022-12-23 DIAGNOSIS — Z00.00 ROUTINE MEDICAL EXAM: ICD-10-CM

## 2022-12-23 DIAGNOSIS — N52.9 ERECTILE DYSFUNCTION, UNSPECIFIED ERECTILE DYSFUNCTION TYPE: ICD-10-CM

## 2022-12-23 LAB
ALBUMIN SERPL BCP-MCNC: 3.7 G/DL (ref 3.5–5.2)
ALP SERPL-CCNC: 41 U/L (ref 55–135)
ALT SERPL W/O P-5'-P-CCNC: 40 U/L (ref 10–44)
ANION GAP SERPL CALC-SCNC: 9 MMOL/L (ref 8–16)
AST SERPL-CCNC: 41 U/L (ref 10–40)
BILIRUB SERPL-MCNC: 0.6 MG/DL (ref 0.1–1)
BUN SERPL-MCNC: 13 MG/DL (ref 6–20)
CALCIUM SERPL-MCNC: 9 MG/DL (ref 8.7–10.5)
CHLORIDE SERPL-SCNC: 107 MMOL/L (ref 95–110)
CHOLEST SERPL-MCNC: 169 MG/DL (ref 120–199)
CHOLEST/HDLC SERPL: 4.8 {RATIO} (ref 2–5)
CO2 SERPL-SCNC: 25 MMOL/L (ref 23–29)
COMPLEXED PSA SERPL-MCNC: 1 NG/ML (ref 0–4)
CREAT SERPL-MCNC: 0.9 MG/DL (ref 0.5–1.4)
ERYTHROCYTE [DISTWIDTH] IN BLOOD BY AUTOMATED COUNT: 14.9 % (ref 11.5–14.5)
EST. GFR  (NO RACE VARIABLE): >60 ML/MIN/1.73 M^2
ESTIMATED AVG GLUCOSE: 131 MG/DL (ref 68–131)
GLUCOSE SERPL-MCNC: 90 MG/DL (ref 70–110)
HBA1C MFR BLD: 6.2 % (ref 4–5.6)
HCT VFR BLD AUTO: 42.7 % (ref 40–54)
HDLC SERPL-MCNC: 35 MG/DL (ref 40–75)
HDLC SERPL: 20.7 % (ref 20–50)
HGB BLD-MCNC: 12.9 G/DL (ref 14–18)
LDLC SERPL CALC-MCNC: 105.6 MG/DL (ref 63–159)
MCH RBC QN AUTO: 23.3 PG (ref 27–31)
MCHC RBC AUTO-ENTMCNC: 30.2 G/DL (ref 32–36)
MCV RBC AUTO: 77 FL (ref 82–98)
NONHDLC SERPL-MCNC: 134 MG/DL
PLATELET # BLD AUTO: 338 K/UL (ref 150–450)
PMV BLD AUTO: 10.1 FL (ref 9.2–12.9)
POTASSIUM SERPL-SCNC: 4.3 MMOL/L (ref 3.5–5.1)
PROT SERPL-MCNC: 6.9 G/DL (ref 6–8.4)
RBC # BLD AUTO: 5.54 M/UL (ref 4.6–6.2)
SODIUM SERPL-SCNC: 141 MMOL/L (ref 136–145)
TRIGL SERPL-MCNC: 142 MG/DL (ref 30–150)
TSH SERPL DL<=0.005 MIU/L-ACNC: 1.49 UIU/ML (ref 0.4–4)
WBC # BLD AUTO: 6.38 K/UL (ref 3.9–12.7)

## 2022-12-23 PROCEDURE — 84403 ASSAY OF TOTAL TESTOSTERONE: CPT | Performed by: FAMILY MEDICINE

## 2022-12-23 PROCEDURE — 85027 COMPLETE CBC AUTOMATED: CPT | Performed by: FAMILY MEDICINE

## 2022-12-23 PROCEDURE — 80061 LIPID PANEL: CPT | Performed by: FAMILY MEDICINE

## 2022-12-23 PROCEDURE — 84443 ASSAY THYROID STIM HORMONE: CPT | Performed by: FAMILY MEDICINE

## 2022-12-23 PROCEDURE — 83036 HEMOGLOBIN GLYCOSYLATED A1C: CPT | Performed by: FAMILY MEDICINE

## 2022-12-23 PROCEDURE — 80053 COMPREHEN METABOLIC PANEL: CPT | Performed by: FAMILY MEDICINE

## 2022-12-23 PROCEDURE — 84270 ASSAY OF SEX HORMONE GLOBUL: CPT | Performed by: FAMILY MEDICINE

## 2022-12-23 PROCEDURE — 84153 ASSAY OF PSA TOTAL: CPT | Performed by: FAMILY MEDICINE

## 2022-12-28 PROBLEM — R68.82 DECREASED LIBIDO: Status: ACTIVE | Noted: 2022-12-28

## 2022-12-28 PROBLEM — E73.9 LACTOSE INTOLERANCE: Status: ACTIVE | Noted: 2022-12-28

## 2022-12-28 PROBLEM — K52.9 CHRONIC DIARRHEA: Status: ACTIVE | Noted: 2022-12-28

## 2023-01-01 LAB
ALBUMIN SERPL-MCNC: 4 G/DL (ref 3.6–5.1)
SHBG SERPL-SCNC: 55 NMOL/L (ref 22–77)
TESTOST FREE SERPL-MCNC: 14.8 PG/ML (ref 46–224)
TESTOST SERPL-MCNC: 182 NG/DL (ref 250–1100)
TESTOSTERONE.FREE+WB SERPL-MCNC: 27.2 NG/DL (ref 110–575)

## 2023-02-15 ENCOUNTER — OFFICE VISIT (OUTPATIENT)
Dept: SPORTS MEDICINE | Facility: CLINIC | Age: 58
End: 2023-02-15
Payer: COMMERCIAL

## 2023-02-15 ENCOUNTER — HOSPITAL ENCOUNTER (OUTPATIENT)
Dept: RADIOLOGY | Facility: HOSPITAL | Age: 58
Discharge: HOME | End: 2023-02-15
Attending: ORTHOPAEDIC SURGERY
Payer: COMMERCIAL

## 2023-02-15 VITALS
HEART RATE: 87 BPM | WEIGHT: 315 LBS | HEIGHT: 71 IN | BODY MASS INDEX: 44.1 KG/M2 | SYSTOLIC BLOOD PRESSURE: 137 MMHG | DIASTOLIC BLOOD PRESSURE: 83 MMHG

## 2023-02-15 DIAGNOSIS — M25.561 PAIN IN BOTH KNEES, UNSPECIFIED CHRONICITY: ICD-10-CM

## 2023-02-15 DIAGNOSIS — M25.562 PAIN IN BOTH KNEES, UNSPECIFIED CHRONICITY: ICD-10-CM

## 2023-02-15 DIAGNOSIS — M25.561 PAIN IN BOTH KNEES, UNSPECIFIED CHRONICITY: Primary | ICD-10-CM

## 2023-02-15 DIAGNOSIS — M25.562 PAIN IN BOTH KNEES, UNSPECIFIED CHRONICITY: Primary | ICD-10-CM

## 2023-02-15 PROCEDURE — 3079F DIAST BP 80-89 MM HG: CPT | Mod: CPTII,S$GLB,, | Performed by: ORTHOPAEDIC SURGERY

## 2023-02-15 PROCEDURE — 99214 PR OFFICE/OUTPT VISIT, EST, LEVL IV, 30-39 MIN: ICD-10-PCS | Mod: 25,S$GLB,, | Performed by: ORTHOPAEDIC SURGERY

## 2023-02-15 PROCEDURE — 20610 DRAIN/INJ JOINT/BURSA W/O US: CPT | Mod: 50,S$GLB,, | Performed by: ORTHOPAEDIC SURGERY

## 2023-02-15 PROCEDURE — 73564 XR KNEE ORTHO BILAT WITH FLEXION: ICD-10-PCS | Mod: 26,50,, | Performed by: RADIOLOGY

## 2023-02-15 PROCEDURE — 99999 PR PBB SHADOW E&M-EST. PATIENT-LVL III: CPT | Mod: PBBFAC,,, | Performed by: ORTHOPAEDIC SURGERY

## 2023-02-15 PROCEDURE — 3008F PR BODY MASS INDEX (BMI) DOCUMENTED: ICD-10-PCS | Mod: CPTII,S$GLB,, | Performed by: ORTHOPAEDIC SURGERY

## 2023-02-15 PROCEDURE — 73564 X-RAY EXAM KNEE 4 OR MORE: CPT | Mod: 26,50,, | Performed by: RADIOLOGY

## 2023-02-15 PROCEDURE — 99999 PR PBB SHADOW E&M-EST. PATIENT-LVL III: ICD-10-PCS | Mod: PBBFAC,,, | Performed by: ORTHOPAEDIC SURGERY

## 2023-02-15 PROCEDURE — 3075F PR MOST RECENT SYSTOLIC BLOOD PRESS GE 130-139MM HG: ICD-10-PCS | Mod: CPTII,S$GLB,, | Performed by: ORTHOPAEDIC SURGERY

## 2023-02-15 PROCEDURE — 3079F PR MOST RECENT DIASTOLIC BLOOD PRESSURE 80-89 MM HG: ICD-10-PCS | Mod: CPTII,S$GLB,, | Performed by: ORTHOPAEDIC SURGERY

## 2023-02-15 PROCEDURE — 3075F SYST BP GE 130 - 139MM HG: CPT | Mod: CPTII,S$GLB,, | Performed by: ORTHOPAEDIC SURGERY

## 2023-02-15 PROCEDURE — 1159F MED LIST DOCD IN RCRD: CPT | Mod: CPTII,S$GLB,, | Performed by: ORTHOPAEDIC SURGERY

## 2023-02-15 PROCEDURE — 73564 X-RAY EXAM KNEE 4 OR MORE: CPT | Mod: TC,50

## 2023-02-15 PROCEDURE — 3008F BODY MASS INDEX DOCD: CPT | Mod: CPTII,S$GLB,, | Performed by: ORTHOPAEDIC SURGERY

## 2023-02-15 PROCEDURE — 99214 OFFICE O/P EST MOD 30 MIN: CPT | Mod: 25,S$GLB,, | Performed by: ORTHOPAEDIC SURGERY

## 2023-02-15 PROCEDURE — 1159F PR MEDICATION LIST DOCUMENTED IN MEDICAL RECORD: ICD-10-PCS | Mod: CPTII,S$GLB,, | Performed by: ORTHOPAEDIC SURGERY

## 2023-02-15 PROCEDURE — 20610 LARGE JOINT ASPIRATION/INJECTION: BILATERAL KNEE: ICD-10-PCS | Mod: 50,S$GLB,, | Performed by: ORTHOPAEDIC SURGERY

## 2023-02-15 RX ORDER — TRIAMCINOLONE ACETONIDE 40 MG/ML
80 INJECTION, SUSPENSION INTRA-ARTICULAR; INTRAMUSCULAR
Status: DISCONTINUED | OUTPATIENT
Start: 2023-02-15 | End: 2023-02-15 | Stop reason: HOSPADM

## 2023-02-15 RX ADMIN — TRIAMCINOLONE ACETONIDE 80 MG: 40 INJECTION, SUSPENSION INTRA-ARTICULAR; INTRAMUSCULAR at 03:02

## 2023-02-15 NOTE — PROGRESS NOTES
CC: bilateral knee pain    57 y.o. Male with a history of Left knee pain who He states that the pain is severe and not responding to any conservative care.    He had prior surgery by Dr. Mclean about 1 year ago as below. His pain is affecting ADLs.      He completed PT as well as Left knee CSI in August of 2022.    He denies mechanical symptoms          DATE OF PROCEDURE: 01/04/2022  SURGEON:  Idalia Mclean M.D  PROCEDURE PERFORMED:   left  1. knee arthroscopic chondroplasty (CPT 05337)  2. knee arthroscopic medial (CPT 82762) meniscectomy   3. knee arthroscopic partial synovectomy/debridement (CPT 98758).   4. knee arthroscopic plica excision(CPT 13742).    5. Knee arthroscopic lysis of adhesions (CPT 91973)    In the patellofemoral compartment, there was chondral damage to:  Patella 5 x 3 mm grade 2  Chondroplasty was performed using arthroscopic shaver.     There was chondral damage to:  Medial femoral condyle 15 x 5 mm grade 3  Chondroplasty was performed using arthroscopic shaver.    Review of Systems   Constitution: Negative. Negative for chills, fever and night sweats.   HENT: Negative for congestion and headaches.    Eyes: Negative for blurred vision, left vision loss and right vision loss.   Cardiovascular: Negative for chest pain and syncope.   Respiratory: Negative for cough and shortness of breath.    Endocrine: Negative for polydipsia, polyphagia and polyuria.   Hematologic/Lymphatic: Negative for bleeding problem. Does not bruise/bleed easily.   Skin: Negative for dry skin, itching and rash.   Musculoskeletal: Negative for falls. Positive for knee pain and muscle weakness.   Gastrointestinal: Negative for abdominal pain and bowel incontinence.   Genitourinary: Negative for bladder incontinence and nocturia.   Neurological: Negative for disturbances in coordination, loss of balance and seizures.   Psychiatric/Behavioral: Negative for depression. The patient does not have insomnia.    Allergic/Immunologic:  Negative for hives and persistent infections.     PAST MEDICAL HISTORY:   Past Medical History:   Diagnosis Date    Allergy     Blood clotting tendency     DJD (degenerative joint disease)     Hyperlipidemia     Low back strain, initial encounter 5/16/2019    Morbid obesity with BMI of 40.0-44.9, adult 3/29/2018    Obesity     Urticaria      PAST SURGICAL HISTORY:   Past Surgical History:   Procedure Laterality Date    ARTHROSCOPY OF KNEE Right     CHONDROPLASTY OF KNEE Left 1/4/2022    Procedure: CHONDROPLASTY, KNEE;  Surgeon: Idalia Mclean MD;  Location: [facility] OR;  Service: Orthopedics;  Laterality: Left;    COLONOSCOPY N/A 2/15/2019    Procedure: COLONOSCOPY;  Surgeon: Boris Carey MD;  Location: [facility] ENDO;  Service: Endoscopy;  Laterality: N/A;    ESOPHAGOGASTRODUODENOSCOPY N/A 2/15/2019    Procedure: ESOPHAGOGASTRODUODENOSCOPY (EGD);  Surgeon: Boris Carey MD;  Location: [facility] ENDO;  Service: Endoscopy;  Laterality: N/A;    KNEE ARTHROSCOPY W/ MENISCECTOMY Left 1/4/2022    Procedure: ARTHROSCOPY, KNEE, WITH MEDIAL MENISCECTOMY;  Surgeon: Idalia Mclean MD;  Location: [facility] OR;  Service: Orthopedics;  Laterality: Left;    KNEE ARTHROSCOPY W/ PLICA EXCISION Left 1/4/2022    Procedure: EXCISION, PLICA, KNEE, ARTHROSCOPIC;  Surgeon: Idalia Mclean MD;  Location: [facility] OR;  Service: Orthopedics;  Laterality: Left;    KNEE DEBRIDEMENT  1/4/2022    Procedure: DEBRIDEMENT, KNEE;  Surgeon: Idalia Mclean MD;  Location: [facility] OR;  Service: Orthopedics;;    KNEE SURGERY Right     SYNOVECTOMY OF KNEE Left 1/4/2022    Procedure: PARTIAL SYNOVECTOMY, KNEE;  Surgeon: Idalia Mclean MD;  Location: [facility] OR;  Service: Orthopedics;  Laterality: Left;     FAMILY HISTORY:   Family History   Problem Relation Age of Onset    Asthma Mother     Heart disease Father     Asthma Brother     Colon cancer Neg Hx     Esophageal cancer Neg Hx     Rectal cancer Neg Hx     Stomach cancer Neg Hx     Ulcerative colitis Neg Hx     Irritable bowel syndrome  "Neg Hx     Crohn's disease Neg Hx     Celiac disease Neg Hx     Melanoma Neg Hx      SOCIAL HISTORY:   Social History     Socioeconomic History    Marital status:    Tobacco Use    Smoking status: Never    Smokeless tobacco: Never   Substance and Sexual Activity    Alcohol use: No     Alcohol/week: 0.0 standard drinks    Drug use: No   Social History Narrative    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  ALLERGIES:   Review of patient's allergies indicates:   Allergen Reactions    Cucumber fruit extract Hives and Itching       VITAL SIGNS: /83   Pulse 87   Ht 5' 11" (1.803 m)   Wt (!) 156.9 kg (346 lb)   BMI 48.26 kg/m²      PHYSICAL EXAMINATION  VITAL SIGNS: /83   Pulse 87   Ht 5' 11" (1.803 m)   Wt (!) 156.9 kg (346 lb)   BMI 48.26 kg/m²    General:  The patient is alert and oriented x 3.  Mood is pleasant.  Observation of ears, eyes and nose reveal no gross abnormalities.  HEENT: NCAT, sclera nonicteric  Lungs: Respirations are equal and unlabored.    Bilateral KNEE EXAMINATION     OBSERVATION / INSPECTION   Gait:   Nonantalgic   Alignment:  Neutral   Scars:   None   Muscle atrophy: Mild  Effusion:  None   Warmth:  None   Discoloration:   none     TENDERNESS / CREPITUS (T / C):          T / C      T / C   Patella   - / -   Lateral joint line   - / -    Peripatellar medial  -  Medial joint line    + / -    Peripatellar lateral -  Medial plica   - / -    Patellar tendon -   Popliteal fossa  - / -    Quad tendon   -   Gastrocnemius   -   Prepatellar Bursa - / -   Quadricep   -   Tibial tubercle  -  Thigh/hamstring  -   Pes anserine/HS -  Fibula    -   ITB   - / -  Tibia     -   Tib/fib joint  - / -  LCL    -     MFC   - / -   MCL: Proximal  -    LFC   - / -    Distal   -          ROM: (* = pain)  PASSIVE   ACTIVE    Left :   5 / 0 / 145   5 / 0 / 145     Right :    5 / 0 / 145   5 / 0 / 145    PATELLOFEMORAL EXAMINATION:  See above " noted areas of tenderness.   Patella position    Subluxation / dislocation: Centered           Sup. / Inf;   Normal   Crepitus (PF):    Absent   Patellar Mobility:       Medial-lateral:   Normal    Superior-inferior:  Normal    Inferior tilt   Normal    Patellar tendon:  Normal   Lateral tilt:    Normal   J-sign:     None   Patellofemoral grind:   No pain       MENISCAL SIGNS:     Pain on terminal extension:  -  Pain on terminal flexion:  -  Mansis maneuver:  - for pain  Squat     - posterior joint pain    LIGAMENT EXAMINATION:  ACL / Lachman:  normal (-1 to 2mm)    PCL-Post.  drawer: normal 0 to 2mm  MCL- Valgus:  normal 0 to 2mm  LCL- Varus:  normal 0 to 2mm  Pivot shift: normal (Equal)   Dial Test: difference c/w other side   At 30° flexion: normal (< 5°)    At 90° flexion: normal (< 5°)   Reverse Pivot Shift:   normal (Equal)     STRENGTH: (* = with pain) PAINFUL SIDE   Quadricep   5/5   Hamstrin/5    EXTREMITY NEURO-VASCULAR EXAMINATION:   Sensation:  Grossly intact to light touch all dermatomal regions.   Motor Function:  Fully intact motor function at hip, knee, foot and ankle    DTRs;  quadriceps and  achilles 2+.  No clonus and downgoing Babinski.    Vascular status:  DP and PT pulses 2+, brisk capillary refill, symmetric.     Other Findings:       X-rays reviewed and interpreted personally by me:  including standing, weight bearing AP and flexion bilateral knees, lateral and merchant views ordered and images reviewed by me show:  No fracture, dislocation     ASSESSMENT:    Bilateral Knee  osteoarthritis      PLAN:   CSI Bilateral knees    PROCEDURE NOTE: bilateral KNEE INJECTION  After time out was performed, including verification of patient ID, procedure, site and side, availability of information and equipment, review of safety issues, and agreement with consent, the procedure site was marked and the patient was prepped aseptically. A diagnostic and therapeutic injection of 2cc 40 mg  kenalog and 1% lidocaine/0.5% sensorcaine was given under sterile technique using a 22g x 1.5 needle into the knee inferolaterally in seated position.   The patient had no adverse reactions to the medication. Pain decreased. The patient was instructed to apply ice to the joint for 20 minutes and avoid strenuous activities for 24-36 hours following the injection. Patient was warned of possible blood sugar and/or blood pressure changes during that time. Following that time, patient can resume regular activities.        All questions were answered, pt will contact us for questions or concerns in the interim.

## 2023-02-15 NOTE — PROCEDURES
Large Joint Aspiration/Injection: bilateral knee    Date/Time: 2/15/2023 3:30 PM  Performed by: Idalia Mclean MD  Authorized by: Idalia Mclean MD     Consent Done?:  Yes (Verbal)  Indications:  Pain  Site marked: the procedure site was marked    Timeout: prior to procedure the correct patient, procedure, and site was verified    Prep: patient was prepped and draped in usual sterile fashion      Details:  Needle Size:  22 G  Approach:  Lateral  Location:  Knee  Laterality:  Bilateral  Site:  Bilateral knee  Medications (Right):  80 mg triamcinolone acetonide 40 mg/mL  Medications (Left):  80 mg triamcinolone acetonide 40 mg/mL  Patient tolerance:  Patient tolerated the procedure well with no immediate complications

## 2023-02-16 ENCOUNTER — HOSPITAL ENCOUNTER (OUTPATIENT)
Dept: RADIOLOGY | Facility: HOSPITAL | Age: 58
Discharge: HOME | End: 2023-02-16
Attending: INTERNAL MEDICINE
Payer: COMMERCIAL

## 2023-02-16 ENCOUNTER — OFFICE VISIT (OUTPATIENT)
Dept: GASTROENTEROLOGY | Facility: CLINIC | Age: 58
End: 2023-02-16
Payer: COMMERCIAL

## 2023-02-16 VITALS — WEIGHT: 315 LBS | BODY MASS INDEX: 44.1 KG/M2 | HEIGHT: 71 IN

## 2023-02-16 DIAGNOSIS — Z12.11 SCREEN FOR COLON CANCER: ICD-10-CM

## 2023-02-16 DIAGNOSIS — K52.9 CHRONIC DIARRHEA: Primary | ICD-10-CM

## 2023-02-16 DIAGNOSIS — K52.9 CHRONIC DIARRHEA: ICD-10-CM

## 2023-02-16 PROCEDURE — 99204 PR OFFICE/OUTPT VISIT, NEW, LEVL IV, 45-59 MIN: ICD-10-PCS | Mod: S$GLB,,, | Performed by: INTERNAL MEDICINE

## 2023-02-16 PROCEDURE — 99999 PR PBB SHADOW E&M-EST. PATIENT-LVL IV: CPT | Mod: PBBFAC,,, | Performed by: INTERNAL MEDICINE

## 2023-02-16 PROCEDURE — 3008F BODY MASS INDEX DOCD: CPT | Mod: CPTII,S$GLB,, | Performed by: INTERNAL MEDICINE

## 2023-02-16 PROCEDURE — 3008F PR BODY MASS INDEX (BMI) DOCUMENTED: ICD-10-PCS | Mod: CPTII,S$GLB,, | Performed by: INTERNAL MEDICINE

## 2023-02-16 PROCEDURE — 74018 XR ABDOMEN AP 1 VIEW: ICD-10-PCS | Mod: 26,,, | Performed by: RADIOLOGY

## 2023-02-16 PROCEDURE — 1159F MED LIST DOCD IN RCRD: CPT | Mod: CPTII,S$GLB,, | Performed by: INTERNAL MEDICINE

## 2023-02-16 PROCEDURE — 99999 PR PBB SHADOW E&M-EST. PATIENT-LVL IV: ICD-10-PCS | Mod: PBBFAC,,, | Performed by: INTERNAL MEDICINE

## 2023-02-16 PROCEDURE — 74018 RADEX ABDOMEN 1 VIEW: CPT | Mod: TC,FY

## 2023-02-16 PROCEDURE — 74018 RADEX ABDOMEN 1 VIEW: CPT | Mod: 26,,, | Performed by: RADIOLOGY

## 2023-02-16 PROCEDURE — 1159F PR MEDICATION LIST DOCUMENTED IN MEDICAL RECORD: ICD-10-PCS | Mod: CPTII,S$GLB,, | Performed by: INTERNAL MEDICINE

## 2023-02-16 PROCEDURE — 99204 OFFICE O/P NEW MOD 45 MIN: CPT | Mod: S$GLB,,, | Performed by: INTERNAL MEDICINE

## 2023-02-16 RX ORDER — POLYETHYLENE GLYCOL 3350, SODIUM SULFATE ANHYDROUS, SODIUM BICARBONATE, SODIUM CHLORIDE, POTASSIUM CHLORIDE 236; 22.74; 6.74; 5.86; 2.97 G/4L; G/4L; G/4L; G/4L; G/4L
4 POWDER, FOR SOLUTION ORAL ONCE
Qty: 4000 ML | Refills: 0 | Status: SHIPPED | OUTPATIENT
Start: 2023-02-16 | End: 2023-02-16

## 2023-02-16 NOTE — PROGRESS NOTES
GASTROENTEROLOGY CLINIC NOTE    Reason for visit: The primary encounter diagnosis was Chronic diarrhea. A diagnosis of Screen for colon cancer was also pertinent to this visit.  Referring provider/PCP: Solo Chacon MD    HPI:  Topher Cr is a 57 y.o. male here today for diarrhea, new patient.  Previously seen by dr doll back 2019    Diarrhea, chronic, runny stool 3-4 times a week.  Not every day. But no constipation by his accord. Last colonoscopy , he completed whole prep, and stil wasn't clean. No vomiting. No radiaing syptmoms. Usually has BM at least daily. No blood in stool. No alleviating facotrs.  Gained 30 lbs since 2018.     Was told lactose intolerance in past.      Prior Endoscopy:  EGD:  2019 [facility]  Gastritis    Colon:  2019   Prep fair    Path negative for celiac, neg HP , negative micro colitis      (Portions of this note were dictated using voice recognition software and may contain dictation related errors in spelling/grammar/syntax not found on text review)    Review of Systems   Constitutional:  Negative for fever and malaise/fatigue.   Respiratory:  Negative for cough and shortness of breath.    Cardiovascular:  Negative for chest pain and palpitations.   Gastrointestinal:  Positive for diarrhea. Negative for abdominal pain, blood in stool, nausea and vomiting.   Neurological:  Negative for dizziness and headaches.     Past Medical History: has a past medical history of Allergy, Blood clotting tendency, DJD (degenerative joint disease), Hyperlipidemia, Low back strain, initial encounter, Morbid obesity with BMI of 40.0-44.9, adult, Obesity, and Urticaria.    Past Surgical History: has a past surgical history that includes Knee surgery (Right); Esophagogastroduodenoscopy (N/A, 2/15/2019); Colonoscopy (N/A, 2/15/2019); Arthroscopy of knee (Right); Knee arthroscopy w/ meniscectomy (Left, 1/4/2022); Chondroplasty of knee (Left, 1/4/2022); Synovectomy of knee (Left, 1/4/2022); Knee  "debridement (1/4/2022); and Knee arthroscopy w/ plica excision (Left, 1/4/2022).    Family History:family history includes Asthma in his brother and mother; Heart disease in his father.    Allergies:   Review of patient's allergies indicates:   Allergen Reactions    Cucumber fruit extract Hives and Itching       Social History: reports that he has never smoked. He has never used smokeless tobacco. He reports that he does not drink alcohol and does not use drugs.    Home medications:   No current outpatient medications on file prior to visit.     Current Facility-Administered Medications on File Prior to Visit   Medication Dose Route Frequency Provider Last Rate Last Admin    [DISCONTINUED] triamcinolone acetonide injection 80 mg  80 mg Intra-articular  Idalia Mclean MD   80 mg at 02/15/23 1530    [DISCONTINUED] triamcinolone acetonide injection 80 mg  80 mg Intra-articular  Idalia Mclean MD   80 mg at 02/15/23 1530       Vital signs:  Ht 5' 11" (1.803 m)   Wt (!) 156.3 kg (344 lb 9.3 oz)   BMI 48.06 kg/m²     Physical Exam  Vitals reviewed.   Constitutional:       Appearance: He is obese. He is not toxic-appearing.   HENT:      Head: Normocephalic and atraumatic.   Eyes:      General: No scleral icterus.     Conjunctiva/sclera: Conjunctivae normal.   Abdominal:      General: There is no distension.      Palpations: Abdomen is soft.      Tenderness: There is no abdominal tenderness.   Neurological:      Mental Status: He is alert and oriented to person, place, and time.      Gait: Gait normal.   Psychiatric:         Mood and Affect: Mood normal.         Behavior: Behavior normal.       I have reviewed prior labs, imaging, notes from last month    Assessment:  1. Chronic diarrhea    2. Screen for colon cancer      Prior egd / colon negative  Prior colonoscopy with a fair to poor prep and recommended repeat in 1 year, although he states he correctly did the bowel preparation, I do wonder if that is more indicative of " actually constipation with an overflow type of diarrhea.    Plan:  Orders Placed This Encounter    Clostridium difficile EIA    Stool culture    X-Ray Abdomen AP 1 View    Giardia / Cryptosporidum, EIA    H. pylori antigen, stool    Calprotectin, Stool    Stool Exam-Ova,Cysts,Parasites    WBC, Stool    Pancreatic elastase, fecal    Fecal fat, qualitative    polyethylene glycol (GOLYTELY) 236-22.74-6.74 -5.86 gram suspension    Case Request Endoscopy: COLONOSCOPY       Xray rule out constipation    Stool studies    Colonoscopy for screening, incidental diarrhea.      RTC prn    Tristan Sorto MD  [facility] [place]

## 2023-02-16 NOTE — PATIENT INSTRUCTIONS
2 day Long Prep GOLYTELY/ COLYTE/ NULYTELY Instructions     [facility] [address]  Clinic Office [phone]  Endoscopy Lab [phone]     You are scheduled for a Colonoscopy with Dr. Sorto on 3/23/23 at [facility] in [place].    Check in at the Hospital -1st floor, Information desk.   Call [phone] to reschedule.     · An adult friend/family member must come with you to drive you home.  You cannot drive, take a taxi, Uber/Lyft or bus to leave the Endoscopy Center alone.  If you do not have someone to drive you home, your test will be cancelled.      · Please follow the directions of your doctor if you take any pills that thin your blood. If you take these meds: Aggrenox, Brilinta, Effient, Eliquis, Lovenox, Plavix, Pletal, Pradaxa, Ticilid, Xarelto or Coumadin, let the doctor's office know.     · DON'T: On the morning of the test do not take insulin or pills for diabetes.      · DO: On the morning of the test, do take any pills for blood pressure, heart, anti-rejection and or seizures with a small sip of water. Bring any inhalers with you.     · To have a good prep, you must follow these instructions - please do not use the directions from the pharmacy.        2 Days Before the Test:     Drink 1 glass of mixture (8 ounces) every 10 minutes until 64 ounces of Gatorade is finished. (Keep it cold and in refrigerator as much as you can while drinking it).   Add 1 capful of Miralax to each 8 ounces of Gatorade = 4 capfuls in 32-ounce bottle = 8 capfuls in 64-ounce bottle.       1 Day Before the Test:  · Drink CLEAR LIQUIDS the whole day before your test.  You can't eat any food for the whole day.        · You CAN have:  º Water, Coffee or decaf coffee (no milk or cream)  º Tea  º Soft drinks - regular and sugar free  º Jello (green or yellow)  º Apple Juice, white grape juice, white cranberry juice  º Gatorade, Power Aid, Crystal Light, Mau Aid  º Lemonade and  Limeade  º Bouillon, clear soup  º Snowball, popsicles  º YOU CAN'T DRINK ANYTHING RED, PURPLE ORANGE OR BLUE   º YOU CAN'T DRINK ALCOHOL  º ONLY DRINK WHAT IS ON THE LIST             · At 5 pm the NIGHT before your test:     º Add water up to the line on the jug of GOLYTELY.  You can add a packet of yellow/green powder drink mix to the jug.  § Make the jug in the morning. Put the bottle in the refrigerator before you need to drink it. It tastes better if it is cold. Do NOT put this solution over ice. It is ok to drink with a straw.  º Drink 1 glass (8 ounces) every 10 minutes until 1/2 of the jug is finished. (Keep it cold and in refrigerator as much as you can while drinking it).  Put the jug in the refrigerator when you finish the first half.     The Day of the test - We will call you 2 days before your test to tell you what time to get there.             · 5 hours before you come to the hospital (this may be in the middle of the night)  º Drink 1 glass (8 ounces) every 10 minutes until the jug is finished. (Keep it cold and in refrigerator as much as you can while drinking it).     º YOU CAN'T EAT OR DRINK ANYTHING ELSE ONCE YOU FINISH THE PREP     º Leave all valuables and jewelry at home. You will be at the hospital for 2-4 hours.     º Call the Endoscopy department at [phone] with any questions about your procedure.

## 2023-02-18 ENCOUNTER — LAB VISIT (OUTPATIENT)
Dept: LAB | Facility: HOSPITAL | Age: 58
End: 2023-02-18
Payer: COMMERCIAL

## 2023-02-18 DIAGNOSIS — K52.9 CHRONIC DIARRHEA: ICD-10-CM

## 2023-02-18 LAB
C DIFF GDH STL QL: NEGATIVE
C DIFF TOX A+B STL QL IA: NEGATIVE
WBC #/AREA STL HPF: NORMAL /[HPF]

## 2023-02-18 PROCEDURE — 87209 SMEAR COMPLEX STAIN: CPT | Performed by: INTERNAL MEDICINE

## 2023-02-18 PROCEDURE — 83993 ASSAY FOR CALPROTECTIN FECAL: CPT | Performed by: INTERNAL MEDICINE

## 2023-02-18 PROCEDURE — 89055 LEUKOCYTE ASSESSMENT FECAL: CPT | Performed by: INTERNAL MEDICINE

## 2023-02-18 PROCEDURE — 87329 GIARDIA AG IA: CPT | Performed by: INTERNAL MEDICINE

## 2023-02-18 PROCEDURE — 87427 SHIGA-LIKE TOXIN AG IA: CPT | Performed by: INTERNAL MEDICINE

## 2023-02-18 PROCEDURE — 82705 FATS/LIPIDS FECES QUAL: CPT | Performed by: INTERNAL MEDICINE

## 2023-02-18 PROCEDURE — 87045 FECES CULTURE AEROBIC BACT: CPT | Performed by: INTERNAL MEDICINE

## 2023-02-18 PROCEDURE — 82653 EL-1 FECAL QUANTITATIVE: CPT | Performed by: INTERNAL MEDICINE

## 2023-02-18 PROCEDURE — 87177 OVA AND PARASITES SMEARS: CPT | Performed by: INTERNAL MEDICINE

## 2023-02-18 PROCEDURE — 87338 HPYLORI STOOL AG IA: CPT | Performed by: INTERNAL MEDICINE

## 2023-02-18 PROCEDURE — 87449 NOS EACH ORGANISM AG IA: CPT | Performed by: INTERNAL MEDICINE

## 2023-02-18 PROCEDURE — 87046 STOOL CULTR AEROBIC BACT EA: CPT | Mod: 59 | Performed by: INTERNAL MEDICINE

## 2023-02-20 LAB
CRYPTOSP AG STL QL IA: NEGATIVE
E COLI SXT1 STL QL IA: NEGATIVE
E COLI SXT2 STL QL IA: NEGATIVE
G LAMBLIA AG STL QL IA: NEGATIVE

## 2023-02-22 LAB
BACTERIA STL CULT: NORMAL
ELASTASE 1, FECAL: 42 MCG/G
FAT STL QL: NORMAL
NEUTRAL FAT STL QL: NORMAL

## 2023-02-24 ENCOUNTER — PATIENT MESSAGE (OUTPATIENT)
Dept: GASTROENTEROLOGY | Facility: CLINIC | Age: 58
End: 2023-02-24
Payer: COMMERCIAL

## 2023-02-24 DIAGNOSIS — K29.50 CHRONIC HELICOBACTER PYLORI GASTRITIS: Primary | ICD-10-CM

## 2023-02-24 DIAGNOSIS — R19.7 DIARRHEA, UNSPECIFIED TYPE: ICD-10-CM

## 2023-02-24 DIAGNOSIS — B96.81 CHRONIC HELICOBACTER PYLORI GASTRITIS: Primary | ICD-10-CM

## 2023-02-24 LAB — CALPROTECTIN STL-MCNT: 147.4 MCG/G

## 2023-02-26 LAB
H PYLORI AG STL QL IA: DETECTED
SPECIMEN SOURCE: ABNORMAL

## 2023-02-27 RX ORDER — BISMUTH SUBCITRATE POTASSIUM, METRONIDAZOLE, TETRACYCLINE HYDROCHLORIDE 140; 125; 125 MG/1; MG/1; MG/1
3 CAPSULE ORAL
Qty: 120 CAPSULE | Refills: 0 | Status: SHIPPED | OUTPATIENT
Start: 2023-02-27 | End: 2023-03-09

## 2023-02-27 RX ORDER — PANTOPRAZOLE SODIUM 40 MG/1
40 TABLET, DELAYED RELEASE ORAL 2 TIMES DAILY
Qty: 20 TABLET | Refills: 0 | Status: ON HOLD | OUTPATIENT
Start: 2023-02-27 | End: 2023-03-23

## 2023-02-28 ENCOUNTER — PATIENT MESSAGE (OUTPATIENT)
Dept: GASTROENTEROLOGY | Facility: CLINIC | Age: 58
End: 2023-02-28
Payer: COMMERCIAL

## 2023-02-28 LAB — O+P STL MICRO: NORMAL

## 2023-03-21 ENCOUNTER — TELEPHONE (OUTPATIENT)
Dept: GASTROENTEROLOGY | Facility: CLINIC | Age: 58
End: 2023-03-21
Payer: COMMERCIAL

## 2023-03-21 NOTE — TELEPHONE ENCOUNTER
Confirmed colonoscopy that is scheduled on 3/23/23. Reviewed 2 day Golytely instructions with patient. Called Golytely in to the pharmacy. Patient has been given a 11:30am arrival time. Patient has been told to complete the 2nd portion of the prep at 6:30am.

## 2023-03-21 NOTE — TELEPHONE ENCOUNTER
----- Message from Lizett Abdi sent at 3/21/2023  2:38 PM CDT -----  .Type:  Needs Medical Advice    Who Called: pt  Would the patient rather a call back or a response via [facility]? call  Best Call Back Number: [phone]  Additional Information:     Pt says that his procedure is supposed to scheduled for Thursday but no one has contacted him

## 2023-03-23 ENCOUNTER — ANESTHESIA EVENT (OUTPATIENT)
Dept: ENDOSCOPY | Facility: HOSPITAL | Age: 58
End: 2023-03-23

## 2023-03-23 ENCOUNTER — ANESTHESIA (OUTPATIENT)
Dept: ENDOSCOPY | Facility: HOSPITAL | Age: 58
End: 2023-03-23
Payer: COMMERCIAL

## 2023-03-23 ENCOUNTER — HOSPITAL ENCOUNTER (OUTPATIENT)
Facility: HOSPITAL | Age: 58
Discharge: HOME | End: 2023-03-23
Attending: INTERNAL MEDICINE | Admitting: INTERNAL MEDICINE
Payer: COMMERCIAL

## 2023-03-23 VITALS
RESPIRATION RATE: 21 BRPM | WEIGHT: 315 LBS | BODY MASS INDEX: 44.1 KG/M2 | SYSTOLIC BLOOD PRESSURE: 125 MMHG | HEART RATE: 78 BPM | TEMPERATURE: 98 F | OXYGEN SATURATION: 99 % | HEIGHT: 71 IN | DIASTOLIC BLOOD PRESSURE: 69 MMHG

## 2023-03-23 DIAGNOSIS — Z12.11 SCREEN FOR COLON CANCER: ICD-10-CM

## 2023-03-23 PROCEDURE — 45380 COLONOSCOPY AND BIOPSY: CPT | Mod: PT | Performed by: INTERNAL MEDICINE

## 2023-03-23 PROCEDURE — 63600175 PHARM REV CODE 636 W HCPCS: Performed by: NURSE ANESTHETIST, CERTIFIED REGISTERED

## 2023-03-23 PROCEDURE — 27201012 HC FORCEPS, HOT/COLD, DISP: Performed by: INTERNAL MEDICINE

## 2023-03-23 PROCEDURE — 37000008 HC ANESTHESIA 1ST 15 MINUTES: Performed by: INTERNAL MEDICINE

## 2023-03-23 PROCEDURE — 88305 TISSUE EXAM BY PATHOLOGIST: CPT | Mod: 59 | Performed by: PATHOLOGY

## 2023-03-23 PROCEDURE — 37000009 HC ANESTHESIA EA ADD 15 MINS: Performed by: INTERNAL MEDICINE

## 2023-03-23 PROCEDURE — 88305 TISSUE EXAM BY PATHOLOGIST: ICD-10-PCS | Mod: 26,,, | Performed by: PATHOLOGY

## 2023-03-23 PROCEDURE — D9220A PRA ANESTHESIA: ICD-10-PCS | Mod: PT,ANES,, | Performed by: ANESTHESIOLOGY

## 2023-03-23 PROCEDURE — 88305 TISSUE EXAM BY PATHOLOGIST: CPT | Mod: 26,,, | Performed by: PATHOLOGY

## 2023-03-23 PROCEDURE — 45380 COLONOSCOPY AND BIOPSY: CPT | Mod: PT,,, | Performed by: INTERNAL MEDICINE

## 2023-03-23 PROCEDURE — D9220A PRA ANESTHESIA: Mod: PT,ANES,, | Performed by: ANESTHESIOLOGY

## 2023-03-23 PROCEDURE — 45380 PR COLONOSCOPY,BIOPSY: ICD-10-PCS | Mod: PT,,, | Performed by: INTERNAL MEDICINE

## 2023-03-23 PROCEDURE — 25000003 PHARM REV CODE 250: Performed by: NURSE ANESTHETIST, CERTIFIED REGISTERED

## 2023-03-23 PROCEDURE — D9220A PRA ANESTHESIA: Mod: PT,CRNA,, | Performed by: NURSE ANESTHETIST, CERTIFIED REGISTERED

## 2023-03-23 PROCEDURE — D9220A PRA ANESTHESIA: ICD-10-PCS | Mod: PT,CRNA,, | Performed by: NURSE ANESTHETIST, CERTIFIED REGISTERED

## 2023-03-23 PROCEDURE — 25000003 PHARM REV CODE 250: Performed by: INTERNAL MEDICINE

## 2023-03-23 RX ORDER — PROPOFOL 10 MG/ML
VIAL (ML) INTRAVENOUS
Status: DISCONTINUED | OUTPATIENT
Start: 2023-03-23 | End: 2023-03-23

## 2023-03-23 RX ORDER — LIDOCAINE HYDROCHLORIDE 20 MG/ML
INJECTION INTRAVENOUS
Status: DISCONTINUED | OUTPATIENT
Start: 2023-03-23 | End: 2023-03-23

## 2023-03-23 RX ORDER — SODIUM CHLORIDE 0.9 % (FLUSH) 0.9 %
10 SYRINGE (ML) INJECTION
Status: DISCONTINUED | OUTPATIENT
Start: 2023-03-23 | End: 2023-03-23 | Stop reason: HOSPADM

## 2023-03-23 RX ORDER — DEXMEDETOMIDINE HYDROCHLORIDE 100 UG/ML
INJECTION, SOLUTION INTRAVENOUS
Status: DISCONTINUED | OUTPATIENT
Start: 2023-03-23 | End: 2023-03-23

## 2023-03-23 RX ORDER — SODIUM CHLORIDE 9 MG/ML
INJECTION, SOLUTION INTRAVENOUS CONTINUOUS
Status: DISCONTINUED | OUTPATIENT
Start: 2023-03-23 | End: 2023-03-23 | Stop reason: HOSPADM

## 2023-03-23 RX ORDER — PROPOFOL 10 MG/ML
VIAL (ML) INTRAVENOUS CONTINUOUS PRN
Status: DISCONTINUED | OUTPATIENT
Start: 2023-03-23 | End: 2023-03-23

## 2023-03-23 RX ADMIN — SODIUM CHLORIDE: 0.9 INJECTION, SOLUTION INTRAVENOUS at 12:03

## 2023-03-23 RX ADMIN — DEXMEDETOMIDINE HCL 4 MCG: 100 INJECTION INTRAVENOUS at 01:03

## 2023-03-23 RX ADMIN — GLYCOPYRROLATE 0.2 MG: 0.2 INJECTION, SOLUTION INTRAMUSCULAR; INTRAVITREAL at 01:03

## 2023-03-23 RX ADMIN — LIDOCAINE HYDROCHLORIDE 50 MG: 20 INJECTION, SOLUTION INTRAVENOUS at 01:03

## 2023-03-23 RX ADMIN — PROPOFOL 10 MG: 10 INJECTION, EMULSION INTRAVENOUS at 01:03

## 2023-03-23 RX ADMIN — PROPOFOL 70 MG: 10 INJECTION, EMULSION INTRAVENOUS at 01:03

## 2023-03-23 RX ADMIN — PROPOFOL 150 MCG/KG/MIN: 10 INJECTION, EMULSION INTRAVENOUS at 01:03

## 2023-03-23 NOTE — H&P
Short Stay Endoscopy History and Physical    PCP - Solo Chacon MD    Procedure - Colonoscopy  ASA - per anesthesia  Mallampati - per anesthesia  History of Anesthesia problems - no  Family history Anesthesia problems - no   Plan of anesthesia - General    HPI:  This is a 57 y.o. male here for evaluation of :   screening exam  Incidental diarrhea    ROS:  Constitutional: No fevers, chills, No weight loss  CV: No chest pain  Pulm: No cough, No shortness of breath  GI: see HPI  Derm: No rash    Medical History:  has a past medical history of Allergy, Blood clotting tendency, DJD (degenerative joint disease), Hyperlipidemia, Low back strain, initial encounter (5/16/2019), Morbid obesity with BMI of 40.0-44.9, adult (3/29/2018), Obesity, and Urticaria.    Surgical History:  has a past surgical history that includes Knee surgery (Right); Esophagogastroduodenoscopy (N/A, 2/15/2019); Colonoscopy (N/A, 2/15/2019); Arthroscopy of knee (Right); Knee arthroscopy w/ meniscectomy (Left, 1/4/2022); Chondroplasty of knee (Left, 1/4/2022); Synovectomy of knee (Left, 1/4/2022); Knee debridement (1/4/2022); and Knee arthroscopy w/ plica excision (Left, 1/4/2022).    Family History: family history includes Asthma in his brother and mother; Heart disease in his father.. Otherwise no colon cancer, inflammatory bowel disease, or GI malignancies.    Social History:  reports that he has never smoked. He has never used smokeless tobacco. He reports that he does not drink alcohol and does not use drugs.    Review of patient's allergies indicates:   Allergen Reactions    Cucumber fruit extract Hives and Itching       Medications:   No medications prior to admission.         Physical Exam:    Vital Signs:   Vitals:    03/23/23 1223   BP: (!) 147/68   Pulse:    Resp:    Temp:        General Appearance: Well appearing in no acute distress  Eyes:    No scleral icterus  ENT: Neck supple, Lips, mucosa, and tongue normal; teeth and gums  normal  Abdomen: Soft, non tender, non distended with positive bowel sounds. No hepatosplenomegaly, ascites, or mass.  Extremities: 2+ pulses, no clubbing, cyanosis or edema  Skin: No rash      Labs:  Lab Results   Component Value Date    WBC 6.38 12/23/2022    HGB 12.9 (L) 12/23/2022    HCT 42.7 12/23/2022     12/23/2022    CHOL 169 12/23/2022    TRIG 142 12/23/2022    HDL 35 (L) 12/23/2022    ALT 40 12/23/2022    AST 41 (H) 12/23/2022     12/23/2022    K 4.3 12/23/2022     12/23/2022    CREATININE 0.9 12/23/2022    BUN 13 12/23/2022    CO2 25 12/23/2022    TSH 1.487 12/23/2022    PSA 1.0 12/23/2022    INR 1.0 12/27/2021    HGBA1C 6.2 (H) 12/23/2022       I have explained the risks and benefits of endoscopy procedures to the patient including but not limited to bleeding, perforation, infection, and death.  The patient was asked if they understand and allowed to ask any further questions to their satisfaction.    Tristan Sorto MD

## 2023-03-23 NOTE — ANESTHESIA POSTPROCEDURE EVALUATION
Anesthesia Post Evaluation    Patient: Topher Cr    Procedure(s) Performed: Procedure(s) (LRB):  COLONOSCOPY (N/A)    Final Anesthesia Type: general      Patient location during evaluation: PACU  Patient participation: Yes- Able to Participate  Level of consciousness: awake and alert  Post-procedure vital signs: reviewed and stable  Pain management: adequate  Airway patency: patent    PONV status at discharge: No PONV  Anesthetic complications: no      Cardiovascular status: blood pressure returned to baseline  Respiratory status: unassisted  Hydration status: euvolemic            Vitals Value Taken Time   /57 03/23/23 1422   Temp 36.8 °C (98.2 °F) 03/23/23 1407   Pulse 75 03/23/23 1422   Resp 20 03/23/23 1422   SpO2 98 % 03/23/23 1422         No case tracking events are documented in the log.      Pain/Clay Score: Clay Score: 9 (3/23/2023  2:22 PM)

## 2023-03-23 NOTE — ANESTHESIA PREPROCEDURE EVALUATION
[facility]  Anesthesia Pre-Operative Evaluation         Patient Name: Topher Cr  YOB: 1965  MRN: [id number]    SUBJECTIVE:     03/23/2023    Procedure(s) (LRB):  COLONOSCOPY (N/A)    Topher Cr is a 57 y.o. male here for Procedure(s) (LRB):  COLONOSCOPY (N/A)    Drips:     Patient Active Problem List   Diagnosis    Abnormal EKG    DJD (degenerative joint disease), ankle and foot, right    Lack of libido    Erectile dysfunction    Umbilical hernia without obstruction and without gangrene    Hypogonadism in male    Class 3 severe obesity due to excess calories without serious comorbidity with body mass index (BMI) of 45.0 to 49.9 in adult    Acute medial meniscus tear of left knee    Decreased ROM of left knee    Decreased strength involving knee joint    Lactose intolerance    Chronic diarrhea    Decreased libido       Review of patient's allergies indicates:   Allergen Reactions    Cucumber fruit extract Hives and Itching       No current facility-administered medications on file prior to encounter.     No current outpatient medications on file prior to encounter.       Past Surgical History:   Procedure Laterality Date    ARTHROSCOPY OF KNEE Right     CHONDROPLASTY OF KNEE Left 1/4/2022    Procedure: CHONDROPLASTY, KNEE;  Surgeon: Idalia Mclean MD;  Location: [facility];  Service: Orthopedics;  Laterality: Left;    COLONOSCOPY N/A 2/15/2019    Procedure: COLONOSCOPY;  Surgeon: Boris Carey MD;  Location: [facility];  Service: Endoscopy;  Laterality: N/A;    ESOPHAGOGASTRODUODENOSCOPY N/A 2/15/2019    Procedure: ESOPHAGOGASTRODUODENOSCOPY (EGD);  Surgeon: Boris Carey MD;  Location: [facility];  Service: Endoscopy;  Laterality: N/A;    KNEE ARTHROSCOPY W/ MENISCECTOMY Left 1/4/2022    Procedure: ARTHROSCOPY, KNEE, WITH MEDIAL MENISCECTOMY;  Surgeon: Idalia Mclean MD;  Location: [facility];  Service: Orthopedics;  Laterality: Left;    KNEE ARTHROSCOPY W/  PLICA EXCISION Left 1/4/2022    Procedure: EXCISION, PLICA, KNEE, ARTHROSCOPIC;  Surgeon: Idalia Mclean MD;  Location: [facility] OR;  Service: Orthopedics;  Laterality: Left;    KNEE DEBRIDEMENT  1/4/2022    Procedure: DEBRIDEMENT, KNEE;  Surgeon: Idalia Mclean MD;  Location: [facility] OR;  Service: Orthopedics;;    KNEE SURGERY Right     SYNOVECTOMY OF KNEE Left 1/4/2022    Procedure: PARTIAL SYNOVECTOMY, KNEE;  Surgeon: Idalia Mclean MD;  Location: [facility] OR;  Service: Orthopedics;  Laterality: Left;       Social History     Socioeconomic History    Marital status:    Tobacco Use    Smoking status: Never    Smokeless tobacco: Never   Substance and Sexual Activity    Alcohol use: No     Alcohol/week: 0.0 standard drinks    Drug use: No   Social History Narrative    Works in [employer], nonsmoker         OBJECTIVE:     Vital Signs Range (Last 24H):       Significant Labs:  Lab Results   Component Value Date    WBC 6.38 12/23/2022    HGB 12.9 (L) 12/23/2022    HCT 42.7 12/23/2022     12/23/2022    CHOL 169 12/23/2022    TRIG 142 12/23/2022    HDL 35 (L) 12/23/2022    ALT 40 12/23/2022    AST 41 (H) 12/23/2022     12/23/2022    K 4.3 12/23/2022     12/23/2022    CREATININE 0.9 12/23/2022    BUN 13 12/23/2022    CO2 25 12/23/2022    TSH 1.487 12/23/2022    PSA 1.0 12/23/2022    INR 1.0 12/27/2021    HGBA1C 6.2 (H) 12/23/2022       Diagnostic Studies:    EKG:   Results for orders placed or performed during the hospital encounter of 12/28/21   SCHEDULED EKG 12-LEAD (to Muse)    Collection Time: 12/28/21 10:22 AM    Narrative    Test Reason : Z01.818,    Vent. Rate : 071 BPM     Atrial Rate : 071 BPM     P-R Int : 178 ms          QRS Dur : 096 ms      QT Int : 404 ms       P-R-T Axes : 036 021 -10 degrees     QTc Int : 439 ms    Normal sinus rhythm  Nonspecific ST and T wave abnormality  Abnormal ECG  When compared with ECG of 21-AUG-2014 09:26,  No significant change was found  Confirmed by Christian HAMILTON,  Solo COTTO ([id number]) on 12/28/2021 1:22:13 PM    Referred By: KISHAN RAMIREZ           Confirmed By:Solo Gonzales MD           Pre-op Assessment    I have reviewed the Patient Summary Reports.     I have reviewed the Nursing Notes. I have reviewed the NPO Status.   I have reviewed the Medications.     Review of Systems  Anesthesia Hx:  History of prior surgery of interest to airway management or planning:  Denies Personal Hx of Anesthesia complications.   Social:  Non-Smoker, No Alcohol Use    Cardiovascular:   hyperlipidemia    Hepatic/GI:   GERD DIARRHEA    Musculoskeletal:   Arthritis   Spine Disorders:    Neurological:   Neuromuscular Disease,    Endocrine:  Morbid Obesity / BMI > 40         Anesthesia Plan  Type of Anesthesia, risks & benefits discussed:    Anesthesia Type: Gen Natural Airway  Intra-op Monitoring Plan: Standard ASA Monitors  Post Op Pain Control Plan: multimodal analgesia and IV/PO Opioids PRN  Induction:  IV  Informed Consent: Informed consent signed with the Patient and all parties understand the risks and agree with anesthesia plan.  All questions answered.   ASA Score: 3  Day of Surgery Review of History & Physical: H&P Update referred to the surgeon/provider.    Ready For Surgery From Anesthesia Perspective.     .

## 2023-03-23 NOTE — TRANSFER OF CARE
"Anesthesia Transfer of Care Note    Patient: Topher Cr    Procedure(s) Performed: Procedure(s) (LRB):  COLONOSCOPY (N/A)    Patient location: GI    Anesthesia Type: general    Transport from OR: Transported from OR on room air with adequate spontaneous ventilation    Post pain: adequate analgesia    Post assessment: no apparent anesthetic complications    Post vital signs: stable    Level of consciousness: awake    Nausea/Vomiting: no nausea/vomiting    Complications: none    Transfer of care protocol was followed      Last vitals:   Visit Vitals  BP (!) 147/68   Pulse 75   Temp 36.6 °C (97.9 °F)   Resp 18   Ht 5' 11" (1.803 m)   Wt (!) 147.9 kg (326 lb)   SpO2 98%   BMI 45.47 kg/m²     "

## 2023-03-23 NOTE — [employer]OVATION PATIENT INSTRUCTIONS
Discharge Summary/Instructions after an Endoscopic Procedure  Patient Name: Topher Cr  Patient MRN: [id number]  Patient YOB: 1965 Thursday, March 23, 2023  Tristan Sorto MD  Dear patient,  As a result of recent federal legislation (The Federal Cures Act), you may   receive lab or pathology results from your procedure in your [facility]   account before your physician is able to contact you. Your physician or   their representative will relay the results to you with their   recommendations at their soonest availability.  Thank you,  Your health is very important to us during the Covid Crisis. Following your   procedure today, you will receive a daily text for 2 weeks asking about   signs or symptoms of Covid 19.  Please respond to this text when you   receive it so we can follow up and keep you as safe as possible.   RESTRICTIONS:  During your procedure today, you received medications for sedation.  These   medications may affect your judgment, balance and coordination.  Therefore,   for 24 hours, you have the following restrictions:   - DO NOT drive a car, operate machinery, make legal/financial decisions,   sign important papers or drink alcohol.    ACTIVITY:  Today: no heavy lifting, straining or running due to procedural   sedation/anesthesia.  The following day: return to full activity including work.  DIET:  Eat and drink normally unless instructed otherwise.     TREATMENT FOR COMMON SIDE EFFECTS:  - Mild abdominal pain, nausea, belching, bloating or excessive gas:  rest,   eat lightly and use a heating pad.  - Sore Throat: treat with throat lozenges and/or gargle with warm salt   water.  - Because air was used during the procedure, expelling large amounts of air   from your rectum or belching is normal.  - If a bowel prep was taken, you may not have a bowel movement for 1-3 days.    This is normal.  SYMPTOMS TO WATCH FOR AND REPORT TO YOUR PHYSICIAN:  1. Abdominal pain or bloating, other than  gas cramps.  2. Chest pain.  3. Back pain.  4. Signs of infection such as: chills or fever occurring within 24 hours   after the procedure.  5. Rectal bleeding, which would show as bright red, maroon, or black stools.   (A tablespoon of blood from the rectum is not serious, especially if   hemorrhoids are present.)  6. Vomiting.  7. Weakness or dizziness.  GO DIRECTLY TO THE NEAREST EMERGENCY ROOM IF YOU HAVE ANY OF THE FOLLOWING:      Difficulty breathing              Chills and/or fever over 101 F   Persistent vomiting and/or vomiting blood   Severe abdominal pain   Severe chest pain   Black, tarry stools   Bleeding- more than one tablespoon   Any other symptom or condition that you feel may need urgent attention  Your doctor recommends these additional instructions:  If any biopsies were taken, your doctors clinic will contact you in 1 to 2   weeks with any results.  - Discharge patient to home.   - Patient has a contact number available for emergencies.  The signs and   symptoms of potential delayed complications were discussed with the   patient.  Return to normal activities tomorrow.  Written discharge   instructions were provided to the patient.   - Resume previous diet.   - Continue present medications.   - Await pathology results.   - Repeat colonoscopy in 10 years for screening purposes.  For questions, problems or results please call your physician - Tristan Sorto MD.  EMERGENCY PHONE NUMBER: [phone],  LAB RESULTS: [phone]  IF A COMPLICATION OR EMERGENCY SITUATION ARISES AND YOU ARE UNABLE TO REACH   YOUR PHYSICIAN - GO DIRECTLY TO THE EMERGENCY ROOM.  Tristan Sorto MD  3/23/2023 2:05:56 PM  This report has been verified and signed electronically.  Dear patient,  As a result of recent federal legislation (The Federal Cures Act), you may   receive lab or pathology results from your procedure in your [facility]   account before your physician is able to contact you. Your physician or    their representative will relay the results to you with their   recommendations at their soonest availability.  Thank you,  PROVATION

## 2023-03-28 LAB
FINAL PATHOLOGIC DIAGNOSIS: NORMAL
GROSS: NORMAL
Lab: NORMAL

## 2023-04-04 ENCOUNTER — HOSPITAL ENCOUNTER (OUTPATIENT)
Dept: RADIOLOGY | Facility: OTHER | Age: 58
Discharge: HOME | End: 2023-04-04
Attending: INTERNAL MEDICINE
Payer: COMMERCIAL

## 2023-04-04 DIAGNOSIS — R19.7 DIARRHEA, UNSPECIFIED TYPE: ICD-10-CM

## 2023-04-04 PROCEDURE — 74178 CT ABD&PLV WO CNTR FLWD CNTR: CPT | Mod: 26,,, | Performed by: RADIOLOGY

## 2023-04-04 PROCEDURE — 74178 CT ABDOMEN PELVIS W WO CONTRAST: ICD-10-PCS | Mod: 26,,, | Performed by: RADIOLOGY

## 2023-04-04 PROCEDURE — 25500020 PHARM REV CODE 255: Performed by: INTERNAL MEDICINE

## 2023-04-04 PROCEDURE — 74178 CT ABD&PLV WO CNTR FLWD CNTR: CPT | Mod: TC

## 2023-04-04 RX ADMIN — IOHEXOL 100 ML: 350 INJECTION, SOLUTION INTRAVENOUS at 03:04

## 2023-04-11 ENCOUNTER — LAB VISIT (OUTPATIENT)
Dept: LAB | Facility: HOSPITAL | Age: 58
End: 2023-04-11
Attending: INTERNAL MEDICINE
Payer: COMMERCIAL

## 2023-04-11 ENCOUNTER — OFFICE VISIT (OUTPATIENT)
Dept: GASTROENTEROLOGY | Facility: CLINIC | Age: 58
End: 2023-04-11
Payer: COMMERCIAL

## 2023-04-11 VITALS — HEIGHT: 71 IN | BODY MASS INDEX: 44.1 KG/M2 | WEIGHT: 315 LBS

## 2023-04-11 DIAGNOSIS — B96.81 CHRONIC HELICOBACTER PYLORI GASTRITIS: ICD-10-CM

## 2023-04-11 DIAGNOSIS — R19.8 ALTERNATING CONSTIPATION AND DIARRHEA: ICD-10-CM

## 2023-04-11 DIAGNOSIS — K86.81 EXOCRINE PANCREATIC INSUFFICIENCY: ICD-10-CM

## 2023-04-11 DIAGNOSIS — K29.50 CHRONIC HELICOBACTER PYLORI GASTRITIS: Primary | ICD-10-CM

## 2023-04-11 DIAGNOSIS — K29.50 CHRONIC HELICOBACTER PYLORI GASTRITIS: ICD-10-CM

## 2023-04-11 DIAGNOSIS — B96.81 CHRONIC HELICOBACTER PYLORI GASTRITIS: Primary | ICD-10-CM

## 2023-04-11 PROCEDURE — 99999 PR PBB SHADOW E&M-EST. PATIENT-LVL III: CPT | Mod: PBBFAC,,, | Performed by: INTERNAL MEDICINE

## 2023-04-11 PROCEDURE — 99214 OFFICE O/P EST MOD 30 MIN: CPT | Mod: S$GLB,,, | Performed by: INTERNAL MEDICINE

## 2023-04-11 PROCEDURE — 99214 PR OFFICE/OUTPT VISIT, EST, LEVL IV, 30-39 MIN: ICD-10-PCS | Mod: S$GLB,,, | Performed by: INTERNAL MEDICINE

## 2023-04-11 PROCEDURE — 3008F PR BODY MASS INDEX (BMI) DOCUMENTED: ICD-10-PCS | Mod: CPTII,S$GLB,, | Performed by: INTERNAL MEDICINE

## 2023-04-11 PROCEDURE — 3008F BODY MASS INDEX DOCD: CPT | Mod: CPTII,S$GLB,, | Performed by: INTERNAL MEDICINE

## 2023-04-11 PROCEDURE — 1159F PR MEDICATION LIST DOCUMENTED IN MEDICAL RECORD: ICD-10-PCS | Mod: CPTII,S$GLB,, | Performed by: INTERNAL MEDICINE

## 2023-04-11 PROCEDURE — 1159F MED LIST DOCD IN RCRD: CPT | Mod: CPTII,S$GLB,, | Performed by: INTERNAL MEDICINE

## 2023-04-11 PROCEDURE — 99999 PR PBB SHADOW E&M-EST. PATIENT-LVL III: ICD-10-PCS | Mod: PBBFAC,,, | Performed by: INTERNAL MEDICINE

## 2023-04-11 PROCEDURE — 87338 HPYLORI STOOL AG IA: CPT | Performed by: INTERNAL MEDICINE

## 2023-04-11 NOTE — PROGRESS NOTES
GASTROENTEROLOGY CLINIC NOTE    Reason for visit: The primary encounter diagnosis was Chronic Helicobacter pylori gastritis. Diagnoses of Alternating constipation and diarrhea and Exocrine pancreatic insufficiency were also pertinent to this visit.  Referring provider/PCP: Solo Chacon MD    HPI:  Topher Cr is a 57 y.o. male here today for follow up  Previously seen by dr doll back 2019    Interval  Here for follow up.   Somewhat constipation recently. Now has normlized. Diarrhea is not present currently.  Trying to use metamucil, when takes it, it does regulate his bowels.  No abd pain , no radiating symptoms.   Had HP stool positive, thus treated with pylera. Completed whole treatment without problems.     ==============================  Initial visit 2/2023  Diarrhea, chronic, runny stool 3-4 times a week.  Not every day. But no constipation by his accord. Last colonoscopy , he completed whole prep, and stil wasn't clean. No vomiting. No radiaing syptmoms. Usually has BM at least daily. No blood in stool. No alleviating facotrs.  Gained 30 lbs since 2018.     Was told lactose intolerance in past.      Prior Endoscopy:  EGD:  2019 [facility]  Gastritis    Colon:  2019   Prep fair    Path negative for celiac, neg HP , negative micro colitis    Colon 2023 with me  Impression:            - One benign appearing type polyp in the terminal                          ileum. Biopsied.                          - The entire examined colon is normal. Biopsied.                          - The examination was otherwise normal on direct                          and retroflexion views.   Recommendation:                              - Resume previous diet.                          - Continue present medications.                          - Await pathology results.                          - Repeat colonoscopy in 10 years for screening                          purposes.   PATH - benign lymphoid follicle  Negative micro  "colitis    (Portions of this note were dictated using voice recognition software and may contain dictation related errors in spelling/grammar/syntax not found on text review)    Review of Systems   Constitutional:  Negative for fever and malaise/fatigue.   Respiratory:  Negative for cough and shortness of breath.    Cardiovascular:  Negative for chest pain and palpitations.   Gastrointestinal:  Negative for abdominal pain, blood in stool, diarrhea, nausea and vomiting.   Neurological:  Negative for dizziness and headaches.     Past Medical History: has a past medical history of Allergy, Blood clotting tendency, DJD (degenerative joint disease), Hyperlipidemia, Low back strain, initial encounter, Morbid obesity with BMI of 40.0-44.9, adult, Obesity, and Urticaria.    Past Surgical History: has a past surgical history that includes Knee surgery (Right); Esophagogastroduodenoscopy (N/A, 2/15/2019); Colonoscopy (N/A, 2/15/2019); Arthroscopy of knee (Right); Knee arthroscopy w/ meniscectomy (Left, 1/4/2022); Chondroplasty of knee (Left, 1/4/2022); Synovectomy of knee (Left, 1/4/2022); Knee debridement (1/4/2022); Knee arthroscopy w/ plica excision (Left, 1/4/2022); and Colonoscopy (N/A, 3/23/2023).    Family History:family history includes Asthma in his brother and mother; Heart disease in his father.    Allergies:   Review of patient's allergies indicates:   Allergen Reactions    Cucumber fruit extract Hives and Itching       Social History: reports that he has never smoked. He has never used smokeless tobacco. He reports that he does not drink alcohol and does not use drugs.    Home medications:   No current outpatient medications on file prior to visit.     No current facility-administered medications on file prior to visit.       Vital signs:  Ht 5' 11" (1.803 m)   Wt (!) 153.9 kg (339 lb 4.6 oz)   BMI 47.32 kg/m²     Physical Exam  Vitals reviewed.   Constitutional:       Appearance: He is obese. He is not " toxic-appearing.   HENT:      Head: Normocephalic and atraumatic.   Eyes:      General: No scleral icterus.     Conjunctiva/sclera: Conjunctivae normal.   Abdominal:      General: There is no distension.      Palpations: Abdomen is soft.      Tenderness: There is no abdominal tenderness.   Neurological:      Mental Status: He is alert and oriented to person, place, and time.      Gait: Gait normal.   Psychiatric:         Mood and Affect: Mood normal.         Behavior: Behavior normal.       I have reviewed prior labs, imaging, notes from last month    Assessment:  1. Chronic Helicobacter pylori gastritis    2. Alternating constipation and diarrhea    3. Exocrine pancreatic insufficiency      Alternating diarrhea / constipation, currently fairly balanced.  Using metamucil which does regulate stools to some degree    Treated with pylera for HP   Elastase is low , CT was normal of the pancreas  Calpro was slightly high, could have been a result of active hpylori.      Plan:  Orders Placed This Encounter    H. pylori antigen, stool     Use metamucil daily and titrate to desired stool    Trial of zenpep given, needs 4 pills before each meal    Check HP Stool for eradication    RTC prn.        Tristan Sorto MD  [facility] [place]

## 2023-04-11 NOTE — PATIENT INSTRUCTIONS
Stool Collection Kit Instructions    Place stool Collection Hat under your toilet seat   With and disposable spoon scoop and fill the provided cup with stool to the half way corazon on the stool cup (if your stool is runny or liquid that is fine)  Place the top on the stool cup  Place the stool cup in the biohazard bag and zip the bag closed   Place the biohazard bag in the brown paper bag that is provided   Bring the stool sample to any [facility] lab (Any location where you can get blood work done)  Discard any extra equipment (throw away the stool hat, spoon WE DO NOT NEED THAT BACK)        If the sample is collected after clinic hours, please place in refrigerator or cooler until the next morning.  Please make sure that there is no Tissue, wipes, or other paper attached to the stool cup (the lab will have you repeat if something is attached to the cup).

## 2023-04-18 LAB
H PYLORI AG STL QL IA: NOT DETECTED
SPECIMEN SOURCE: NORMAL

## 2023-04-19 ENCOUNTER — LAB VISIT (OUTPATIENT)
Dept: LAB | Facility: HOSPITAL | Age: 58
End: 2023-04-19
Payer: COMMERCIAL

## 2023-04-19 ENCOUNTER — OFFICE VISIT (OUTPATIENT)
Dept: UROLOGY | Facility: CLINIC | Age: 58
End: 2023-04-19
Payer: COMMERCIAL

## 2023-04-19 VITALS
HEIGHT: 71 IN | DIASTOLIC BLOOD PRESSURE: 83 MMHG | WEIGHT: 315 LBS | BODY MASS INDEX: 44.1 KG/M2 | HEART RATE: 78 BPM | SYSTOLIC BLOOD PRESSURE: 147 MMHG

## 2023-04-19 DIAGNOSIS — N40.1 BPH WITH URINARY OBSTRUCTION: ICD-10-CM

## 2023-04-19 DIAGNOSIS — N52.01 ERECTILE DYSFUNCTION DUE TO ARTERIAL INSUFFICIENCY: Primary | ICD-10-CM

## 2023-04-19 DIAGNOSIS — N13.8 BPH WITH URINARY OBSTRUCTION: ICD-10-CM

## 2023-04-19 DIAGNOSIS — R19.7 DIARRHEA, UNSPECIFIED TYPE: ICD-10-CM

## 2023-04-19 PROBLEM — S83.242A ACUTE MEDIAL MENISCUS TEAR OF LEFT KNEE: Status: RESOLVED | Noted: 2022-01-04 | Resolved: 2023-04-19

## 2023-04-19 PROBLEM — K52.9 CHRONIC DIARRHEA: Status: RESOLVED | Noted: 2022-12-28 | Resolved: 2023-04-19

## 2023-04-19 PROBLEM — E29.1 HYPOGONADISM IN MALE: Status: RESOLVED | Noted: 2020-01-10 | Resolved: 2023-04-19

## 2023-04-19 PROBLEM — F52.0 LACK OF LIBIDO: Status: RESOLVED | Noted: 2019-11-29 | Resolved: 2023-04-19

## 2023-04-19 PROBLEM — R68.82 DECREASED LIBIDO: Status: RESOLVED | Noted: 2022-12-28 | Resolved: 2023-04-19

## 2023-04-19 LAB
CREAT SERPL-MCNC: 0.9 MG/DL (ref 0.5–1.4)
EST. GFR  (NO RACE VARIABLE): >60 ML/MIN/1.73 M^2

## 2023-04-19 PROCEDURE — 3079F PR MOST RECENT DIASTOLIC BLOOD PRESSURE 80-89 MM HG: ICD-10-PCS | Mod: CPTII,S$GLB,, | Performed by: UROLOGY

## 2023-04-19 PROCEDURE — 3077F PR MOST RECENT SYSTOLIC BLOOD PRESSURE >= 140 MM HG: ICD-10-PCS | Mod: CPTII,S$GLB,, | Performed by: UROLOGY

## 2023-04-19 PROCEDURE — 3079F DIAST BP 80-89 MM HG: CPT | Mod: CPTII,S$GLB,, | Performed by: UROLOGY

## 2023-04-19 PROCEDURE — 3008F BODY MASS INDEX DOCD: CPT | Mod: CPTII,S$GLB,, | Performed by: UROLOGY

## 2023-04-19 PROCEDURE — 99204 OFFICE O/P NEW MOD 45 MIN: CPT | Mod: S$GLB,,, | Performed by: UROLOGY

## 2023-04-19 PROCEDURE — 3008F PR BODY MASS INDEX (BMI) DOCUMENTED: ICD-10-PCS | Mod: CPTII,S$GLB,, | Performed by: UROLOGY

## 2023-04-19 PROCEDURE — 82565 ASSAY OF CREATININE: CPT | Performed by: INTERNAL MEDICINE

## 2023-04-19 PROCEDURE — 1160F RVW MEDS BY RX/DR IN RCRD: CPT | Mod: CPTII,S$GLB,, | Performed by: UROLOGY

## 2023-04-19 PROCEDURE — 1160F PR REVIEW ALL MEDS BY PRESCRIBER/CLIN PHARMACIST DOCUMENTED: ICD-10-PCS | Mod: CPTII,S$GLB,, | Performed by: UROLOGY

## 2023-04-19 PROCEDURE — 1159F PR MEDICATION LIST DOCUMENTED IN MEDICAL RECORD: ICD-10-PCS | Mod: CPTII,S$GLB,, | Performed by: UROLOGY

## 2023-04-19 PROCEDURE — 1159F MED LIST DOCD IN RCRD: CPT | Mod: CPTII,S$GLB,, | Performed by: UROLOGY

## 2023-04-19 PROCEDURE — 99999 PR PBB SHADOW E&M-EST. PATIENT-LVL III: CPT | Mod: PBBFAC,,, | Performed by: UROLOGY

## 2023-04-19 PROCEDURE — 3077F SYST BP >= 140 MM HG: CPT | Mod: CPTII,S$GLB,, | Performed by: UROLOGY

## 2023-04-19 PROCEDURE — 99999 PR PBB SHADOW E&M-EST. PATIENT-LVL III: ICD-10-PCS | Mod: PBBFAC,,, | Performed by: UROLOGY

## 2023-04-19 PROCEDURE — 99204 PR OFFICE/OUTPT VISIT, NEW, LEVL IV, 45-59 MIN: ICD-10-PCS | Mod: S$GLB,,, | Performed by: UROLOGY

## 2023-04-19 PROCEDURE — 36415 COLL VENOUS BLD VENIPUNCTURE: CPT | Performed by: INTERNAL MEDICINE

## 2023-04-19 RX ORDER — TADALAFIL 20 MG/1
20 TABLET ORAL DAILY PRN
Qty: 10 TABLET | Refills: 11 | Status: SHIPPED | OUTPATIENT
Start: 2023-04-19 | End: 2023-06-01 | Stop reason: SDUPTHER

## 2023-04-19 NOTE — PROGRESS NOTES
CHIEF COMPLAINT:    Mr. Cr is a 57 y.o. male presenting with ED.    PRESENTING ILLNESS:    Topher Cr is a 57 y.o. male who c/o ED.  He's never had  a T drawn.  He's tried Viagra with good results.     He has LUTS.  + decreased FOS.  Is pleased with how he voids.    REVIEW OF SYSTEMS:    Topher Cr denies headache, blurred vision, fever, nausea, vomiting, chills, abdominal pain, chest pain, sore throat, bleeding per rectum, cough, SOB, recent loss of consciousness, recent mental status changes, seizures, dizziness, or upper or lower extremity weakness.    MENDOZA  1. 2  2. 0  3. 0  4. 0  5. 0      PATIENT HISTORY:    Past Medical History:   Diagnosis Date    Allergy     Blood clotting tendency     DJD (degenerative joint disease)     Hyperlipidemia     Low back strain, initial encounter 5/16/2019    Morbid obesity with BMI of 40.0-44.9, adult 3/29/2018    Obesity     Urticaria        Past Surgical History:   Procedure Laterality Date    ARTHROSCOPY OF KNEE Right     CHONDROPLASTY OF KNEE Left 1/4/2022    Procedure: CHONDROPLASTY, KNEE;  Surgeon: Idalia Mclean MD;  Location: [facility];  Service: Orthopedics;  Laterality: Left;    COLONOSCOPY N/A 2/15/2019    Procedure: COLONOSCOPY;  Surgeon: Boris Carey MD;  Location: [facility];  Service: Endoscopy;  Laterality: N/A;    COLONOSCOPY N/A 3/23/2023    Procedure: COLONOSCOPY;  Surgeon: Tristan Sorto MD;  Location: [facility];  Service: Endoscopy;  Laterality: N/A;    ESOPHAGOGASTRODUODENOSCOPY N/A 2/15/2019    Procedure: ESOPHAGOGASTRODUODENOSCOPY (EGD);  Surgeon: Boris Carey MD;  Location: [facility];  Service: Endoscopy;  Laterality: N/A;    KNEE ARTHROSCOPY W/ MENISCECTOMY Left 1/4/2022    Procedure: ARTHROSCOPY, KNEE, WITH MEDIAL MENISCECTOMY;  Surgeon: Idalia Mclean MD;  Location: [facility];  Service: Orthopedics;  Laterality: Left;    KNEE ARTHROSCOPY W/ PLICA EXCISION Left 1/4/2022    Procedure: EXCISION, PLICA, KNEE, ARTHROSCOPIC;  Surgeon: Idalia Mclean  MD;  Location: [facility] OR;  Service: Orthopedics;  Laterality: Left;    KNEE DEBRIDEMENT  1/4/2022    Procedure: DEBRIDEMENT, KNEE;  Surgeon: Idalia Mclean MD;  Location: [facility] OR;  Service: Orthopedics;;    KNEE SURGERY Right     SYNOVECTOMY OF KNEE Left 1/4/2022    Procedure: PARTIAL SYNOVECTOMY, KNEE;  Surgeon: Idalia Mclean MD;  Location: [facility] OR;  Service: Orthopedics;  Laterality: Left;       Family History   Problem Relation Age of Onset    Asthma Mother     Heart disease Father     Asthma Brother     Colon cancer Neg Hx     Esophageal cancer Neg Hx     Rectal cancer Neg Hx     Stomach cancer Neg Hx     Ulcerative colitis Neg Hx     Irritable bowel syndrome Neg Hx     Crohn's disease Neg Hx     Celiac disease Neg Hx     Melanoma Neg Hx        Social History     Socioeconomic History    Marital status:    Tobacco Use    Smoking status: Never    Smokeless tobacco: Never   Substance and Sexual Activity    Alcohol use: No     Alcohol/week: 0.0 standard drinks    Drug use: No   Social History Narrative    Works in [employer], nonsmoker       Allergies:  Cucumber fruit extract    Medications:  No current outpatient medications on file.    PHYSICAL EXAMINATION:    The patient generally appears in good health, is appropriately interactive, and is in no apparent distress.     Eyes: anicteric sclerae, moist conjunctivae; no lid-lag; PERRLA     HENT: Atraumatic; oropharynx clear with moist mucous membranes and no mucosal ulcerations;normal hard and soft palate.  No evidence of lymphadenopathy.    Neck: Trachea midline.  No thyromegaly.    Skin: No lesions.    Mental: Cooperative with normal affect.  Is oriented to time, place, and person.    Neuro: Grossly intact.    Chest: Normal inspiratory effort.   No accessory muscles.  No audible wheezes.  Respirations symmetric on inspiration and expiration.    Heart: Regular rhythm.      Abdomen:  Soft, non-tender. No masses or organomegaly. Bladder is not palpable. No  evidence of flank discomfort. No evidence of inguinal hernia.    Genitourinary: The penis is not circumcised with no evidence of plaques or induration. The urethral meatus is normal. The testes, epididymides, and cord structures are normal in size and contour bilaterally. The scrotum is normal in size and contour.    Normal anal sphincter tone. No rectal mass.    The prostate is smooth. Normal landmarks. Lateral sulci. Median furrow intact.  No nodularity or induration. Seminal vesicles are normal.    Extremities: No clubbing, cyanosis, or edema      LABS:      Lab Results   Component Value Date    PSA 1.0 12/23/2022    PSA 0.73 09/27/2021    PSA 0.38 07/30/2020       IMPRESSION:    Encounter Diagnoses   Name Primary?    Erectile dysfunction due to arterial insufficiency Yes    BPH with urinary obstruction          PLAN:    1. Will observe his LUTS as they don't bother him.  2. Discussed options for his ED.  He'd like Cialis. Side effects discussed.  A new Rx was given  3. Will check a T for the ED.  4. RTC 3 months.    Copy to:

## 2023-06-01 ENCOUNTER — LAB VISIT (OUTPATIENT)
Dept: LAB | Facility: HOSPITAL | Age: 58
End: 2023-06-01
Attending: FAMILY MEDICINE
Payer: COMMERCIAL

## 2023-06-01 ENCOUNTER — OFFICE VISIT (OUTPATIENT)
Dept: PRIMARY CARE CLINIC | Facility: CLINIC | Age: 58
End: 2023-06-01
Payer: COMMERCIAL

## 2023-06-01 VITALS
SYSTOLIC BLOOD PRESSURE: 146 MMHG | BODY MASS INDEX: 44.1 KG/M2 | HEART RATE: 74 BPM | TEMPERATURE: 98 F | WEIGHT: 315 LBS | HEIGHT: 71 IN | OXYGEN SATURATION: 98 % | DIASTOLIC BLOOD PRESSURE: 72 MMHG

## 2023-06-01 DIAGNOSIS — I10 PRIMARY HYPERTENSION: Primary | ICD-10-CM

## 2023-06-01 DIAGNOSIS — I10 PRIMARY HYPERTENSION: ICD-10-CM

## 2023-06-01 LAB
ANION GAP SERPL CALC-SCNC: 10 MMOL/L (ref 8–16)
BUN SERPL-MCNC: 13 MG/DL (ref 6–20)
CALCIUM SERPL-MCNC: 9.3 MG/DL (ref 8.7–10.5)
CHLORIDE SERPL-SCNC: 105 MMOL/L (ref 95–110)
CO2 SERPL-SCNC: 22 MMOL/L (ref 23–29)
CREAT SERPL-MCNC: 0.9 MG/DL (ref 0.5–1.4)
EST. GFR  (NO RACE VARIABLE): >60 ML/MIN/1.73 M^2
GLUCOSE SERPL-MCNC: 95 MG/DL (ref 70–110)
POTASSIUM SERPL-SCNC: 4 MMOL/L (ref 3.5–5.1)
SODIUM SERPL-SCNC: 137 MMOL/L (ref 136–145)

## 2023-06-01 PROCEDURE — 36415 COLL VENOUS BLD VENIPUNCTURE: CPT | Mod: PN | Performed by: FAMILY MEDICINE

## 2023-06-01 PROCEDURE — 80048 BASIC METABOLIC PNL TOTAL CA: CPT | Performed by: FAMILY MEDICINE

## 2023-06-01 PROCEDURE — 99999 PR PBB SHADOW E&M-EST. PATIENT-LVL IV: CPT | Mod: PBBFAC,,, | Performed by: FAMILY MEDICINE

## 2023-06-01 PROCEDURE — 4010F ACE/ARB THERAPY RXD/TAKEN: CPT | Mod: CPTII,S$GLB,, | Performed by: FAMILY MEDICINE

## 2023-06-01 PROCEDURE — 1160F PR REVIEW ALL MEDS BY PRESCRIBER/CLIN PHARMACIST DOCUMENTED: ICD-10-PCS | Mod: CPTII,S$GLB,, | Performed by: FAMILY MEDICINE

## 2023-06-01 PROCEDURE — 1160F RVW MEDS BY RX/DR IN RCRD: CPT | Mod: CPTII,S$GLB,, | Performed by: FAMILY MEDICINE

## 2023-06-01 PROCEDURE — 4010F PR ACE/ARB THEARPY RXD/TAKEN: ICD-10-PCS | Mod: CPTII,S$GLB,, | Performed by: FAMILY MEDICINE

## 2023-06-01 PROCEDURE — 99999 PR PBB SHADOW E&M-EST. PATIENT-LVL IV: ICD-10-PCS | Mod: PBBFAC,,, | Performed by: FAMILY MEDICINE

## 2023-06-01 PROCEDURE — 99214 OFFICE O/P EST MOD 30 MIN: CPT | Mod: S$GLB,,, | Performed by: FAMILY MEDICINE

## 2023-06-01 PROCEDURE — 3078F PR MOST RECENT DIASTOLIC BLOOD PRESSURE < 80 MM HG: ICD-10-PCS | Mod: CPTII,S$GLB,, | Performed by: FAMILY MEDICINE

## 2023-06-01 PROCEDURE — 3008F BODY MASS INDEX DOCD: CPT | Mod: CPTII,S$GLB,, | Performed by: FAMILY MEDICINE

## 2023-06-01 PROCEDURE — 1159F MED LIST DOCD IN RCRD: CPT | Mod: CPTII,S$GLB,, | Performed by: FAMILY MEDICINE

## 2023-06-01 PROCEDURE — 1159F PR MEDICATION LIST DOCUMENTED IN MEDICAL RECORD: ICD-10-PCS | Mod: CPTII,S$GLB,, | Performed by: FAMILY MEDICINE

## 2023-06-01 PROCEDURE — 3008F PR BODY MASS INDEX (BMI) DOCUMENTED: ICD-10-PCS | Mod: CPTII,S$GLB,, | Performed by: FAMILY MEDICINE

## 2023-06-01 PROCEDURE — 3077F SYST BP >= 140 MM HG: CPT | Mod: CPTII,S$GLB,, | Performed by: FAMILY MEDICINE

## 2023-06-01 PROCEDURE — 3077F PR MOST RECENT SYSTOLIC BLOOD PRESSURE >= 140 MM HG: ICD-10-PCS | Mod: CPTII,S$GLB,, | Performed by: FAMILY MEDICINE

## 2023-06-01 PROCEDURE — 99214 PR OFFICE/OUTPT VISIT, EST, LEVL IV, 30-39 MIN: ICD-10-PCS | Mod: S$GLB,,, | Performed by: FAMILY MEDICINE

## 2023-06-01 PROCEDURE — 3078F DIAST BP <80 MM HG: CPT | Mod: CPTII,S$GLB,, | Performed by: FAMILY MEDICINE

## 2023-06-01 RX ORDER — HYDROCHLOROTHIAZIDE 12.5 MG/1
12.5 TABLET ORAL DAILY
Qty: 30 TABLET | Refills: 1 | Status: SHIPPED | OUTPATIENT
Start: 2023-06-01 | End: 2023-11-07 | Stop reason: SDUPTHER

## 2023-06-01 RX ORDER — TADALAFIL 20 MG/1
20 TABLET ORAL DAILY
Qty: 10 TABLET | Refills: 11 | Status: SHIPPED | OUTPATIENT
Start: 2023-06-01

## 2023-06-01 RX ORDER — CANDESARTAN 8 MG/1
TABLET ORAL
Qty: 30 TABLET | Refills: 1 | Status: SHIPPED | OUTPATIENT
Start: 2023-06-01 | End: 2023-06-29 | Stop reason: SDUPTHER

## 2023-06-01 NOTE — PROGRESS NOTES
"    BP (!) 146/72   Pulse 74   Temp 98.1 °F (36.7 °C) (Oral)   Ht 5' 11" (1.803 m)   Wt (!) 153 kg (337 lb 4.9 oz)   SpO2 98%   BMI 47.04 kg/m²       ===========    Chief Complaint: No chief complaint on file.          HPI    Topher Cr is a 57 y.o. male     here for    Concerns about blood pressure.  Asymptomatic.  Has erectile dysfunction.  Has seen urology.      Patient queried and denies any further complaints    Patient has MEDICAL HISTORY OF  Patient Active Problem List   Diagnosis    Abnormal EKG    DJD (degenerative joint disease), ankle and foot, right    Erectile dysfunction due to arterial insufficiency    Umbilical hernia without obstruction and without gangrene    Class 3 severe obesity due to excess calories without serious comorbidity with body mass index (BMI) of 45.0 to 49.9 in adult    Decreased ROM of left knee    Decreased strength involving knee joint    Lactose intolerance    BPH with urinary obstruction       SURGICAL AND MEDICAL HISTORY: updated and reviewed.  Past Surgical History:   Procedure Laterality Date    ARTHROSCOPY OF KNEE Right     CHONDROPLASTY OF KNEE Left 1/4/2022    Procedure: CHONDROPLASTY, KNEE;  Surgeon: Idalia Mclean MD;  Location: [facility];  Service: Orthopedics;  Laterality: Left;    COLONOSCOPY N/A 2/15/2019    Procedure: COLONOSCOPY;  Surgeon: Boris Carey MD;  Location: [facility];  Service: Endoscopy;  Laterality: N/A;    COLONOSCOPY N/A 3/23/2023    Procedure: COLONOSCOPY;  Surgeon: Tristan Sorto MD;  Location: [facility];  Service: Endoscopy;  Laterality: N/A;    ESOPHAGOGASTRODUODENOSCOPY N/A 2/15/2019    Procedure: ESOPHAGOGASTRODUODENOSCOPY (EGD);  Surgeon: Boris Carey MD;  Location: [facility];  Service: Endoscopy;  Laterality: N/A;    KNEE ARTHROSCOPY W/ MENISCECTOMY Left 1/4/2022    Procedure: ARTHROSCOPY, KNEE, WITH MEDIAL MENISCECTOMY;  Surgeon: Idalai Mclean MD;  Location: [facility];  Service: Orthopedics;  Laterality: Left;    KNEE ARTHROSCOPY W/ " PLICA EXCISION Left 1/4/2022    Procedure: EXCISION, PLICA, KNEE, ARTHROSCOPIC;  Surgeon: Idalia Mclean MD;  Location: [facility] OR;  Service: Orthopedics;  Laterality: Left;    KNEE DEBRIDEMENT  1/4/2022    Procedure: DEBRIDEMENT, KNEE;  Surgeon: Idalia Mclean MD;  Location: [facility] OR;  Service: Orthopedics;;    KNEE SURGERY Right     SYNOVECTOMY OF KNEE Left 1/4/2022    Procedure: PARTIAL SYNOVECTOMY, KNEE;  Surgeon: Idalia Mclean MD;  Location: [facility] OR;  Service: Orthopedics;  Laterality: Left;     ALLERGIES updated and reviewed.  Review of patient's allergies indicates:   Allergen Reactions    Cucumber fruit extract Hives and Itching       CURRENT OUTPATIENT MEDICATIONS updated and reviewed    Current Outpatient Medications:     candesartan (ATACAND) 8 MG tablet, Take 1/2 tablet by mouth once daily for 4 days, then increase to one tablet daily for hypertension, Disp: 30 tablet, Rfl: 1    hydroCHLOROthiazide (HYDRODIURIL) 12.5 MG Tab, Take 1 tablet (12.5 mg total) by mouth once daily., Disp: 30 tablet, Rfl: 1    tadalafiL (CIALIS) 20 MG Tab, Take 1 tablet (20 mg total) by mouth once daily. Take 1 hour before intercourse, Disp: 10 tablet, Rfl: 11    Review of Systems   Constitutional:  Negative for activity change, appetite change, chills, diaphoresis, fatigue, fever and unexpected weight change.   HENT:  Negative for congestion, ear discharge, ear pain, facial swelling, hearing loss, nosebleeds, postnasal drip, rhinorrhea, sinus pressure, sneezing, sore throat, tinnitus, trouble swallowing and voice change.    Eyes:  Negative for photophobia, pain, discharge, redness, itching and visual disturbance.   Respiratory:  Negative for cough, chest tightness, shortness of breath and wheezing.    Cardiovascular:  Negative for chest pain, palpitations and leg swelling.   Gastrointestinal:  Negative for abdominal distention, abdominal pain, anal bleeding, blood in stool, constipation, diarrhea, nausea, rectal pain and vomiting.  "  Endocrine: Negative for cold intolerance, heat intolerance, polydipsia, polyphagia and polyuria.   Genitourinary:  Negative for difficulty urinating, dysuria and flank pain.   Musculoskeletal:  Negative for arthralgias, back pain, joint swelling, myalgias and neck pain.   Skin:  Negative for rash.   Neurological:  Negative for dizziness, tremors, seizures, syncope, speech difficulty, weakness, light-headedness, numbness and headaches.   Psychiatric/Behavioral:  Negative for behavioral problems, confusion, decreased concentration, dysphoric mood, sleep disturbance and suicidal ideas. The patient is not nervous/anxious and is not hyperactive.      BP (!) 146/72   Pulse 74   Temp 98.1 °F (36.7 °C) (Oral)   Ht 5' 11" (1.803 m)   Wt (!) 153 kg (337 lb 4.9 oz)   SpO2 98%   BMI 47.04 kg/m²   Physical Exam  Vitals and nursing note reviewed.   Constitutional:       General: He is not in acute distress.     Appearance: Normal appearance. He is well-developed. He is not ill-appearing or toxic-appearing.   HENT:      Head: Normocephalic and atraumatic.      Right Ear: Tympanic membrane, ear canal and external ear normal.      Left Ear: Tympanic membrane, ear canal and external ear normal.      Nose: Nose normal.      Mouth/Throat:      Lips: Pink.      Mouth: Mucous membranes are moist.      Pharynx: No oropharyngeal exudate or posterior oropharyngeal erythema.   Eyes:      General: No scleral icterus.        Right eye: No discharge.         Left eye: No discharge.      Extraocular Movements: Extraocular movements intact.      Conjunctiva/sclera: Conjunctivae normal.   Cardiovascular:      Rate and Rhythm: Normal rate and regular rhythm.      Pulses: Normal pulses.      Heart sounds: Normal heart sounds. No murmur heard.  Pulmonary:      Effort: Pulmonary effort is normal. No respiratory distress.      Breath sounds: Normal breath sounds. No wheezing or rales.   Abdominal:      General: Bowel sounds are normal. There is " no distension.      Palpations: Abdomen is soft. There is no mass.      Tenderness: There is no abdominal tenderness. There is no right CVA tenderness, left CVA tenderness, guarding or rebound.      Hernia: No hernia is present.   Musculoskeletal:      Cervical back: Normal range of motion and neck supple. No rigidity or tenderness.   Lymphadenopathy:      Cervical: No cervical adenopathy.   Skin:     General: Skin is warm and dry.   Neurological:      General: No focal deficit present.      Mental Status: He is alert. Mental status is at baseline.   Psychiatric:         Mood and Affect: Mood normal.         Behavior: Behavior normal. Behavior is cooperative.       ASSESSMENT/PLAN  Diagnoses and all orders for this visit:    Primary hypertension  -     BASIC METABOLIC PANEL; Future    Other orders  -     tadalafiL (CIALIS) 20 MG Tab; Take 1 tablet (20 mg total) by mouth once daily. Take 1 hour before intercourse  -     candesartan (ATACAND) 8 MG tablet; Take 1/2 tablet by mouth once daily for 4 days, then increase to one tablet daily for hypertension  -     hydroCHLOROthiazide (HYDRODIURIL) 12.5 MG Tab; Take 1 tablet (12.5 mg total) by mouth once daily.        Educated patient most likely problem with erectile dysfunction is the uncontrolled blood pressure.  He understands that there is very low likelihood of candesartan increasing problems with ED and understands that there is a very small risk that HCTZ may not help the ED    Explained to patient that without insurance Cialis is very affordable--approximately dollar a pill--at many local pharmacies--many [facility] pharmacies and [facility] pharmacies so he would like a prescription sent locally   Side effects discussed including headache, vision changes, back pain, nasal stuffiness, BP drop etcetera.    Drinks little to no alcohol.  Not taking NSAIDs regularly.  Not taking Sudafed or phenylephrine.  Nonsmoker.    Candesartan as above and HCTZ as above.    All lab  results over past 2 years available reviewed inc labs and any cardiology or radiology studies.  Any new prescription medications gone over in detail including reason for taking the medication, the general mechanism of action, most common possible side effects and possible costs, etcetera.    Chronic conditions updated. Other than changes or additions as above, cont current medications and maintain follow-up with specialists if indicated.     Solo Chacon MD  A dictation device was used to produce this document. Use of such devices sometimes results in grammatical errors or replacement of words that sound similarly.

## 2023-06-06 ENCOUNTER — PATIENT MESSAGE (OUTPATIENT)
Dept: PRIMARY CARE CLINIC | Facility: CLINIC | Age: 58
End: 2023-06-06
Payer: COMMERCIAL

## 2023-06-07 ENCOUNTER — TELEPHONE (OUTPATIENT)
Dept: PRIMARY CARE CLINIC | Facility: CLINIC | Age: 58
End: 2023-06-07
Payer: COMMERCIAL

## 2023-06-07 DIAGNOSIS — R73.03 PREDIABETES: Primary | ICD-10-CM

## 2023-06-07 NOTE — TELEPHONE ENCOUNTER
----- Message from Solo Chacon MD sent at 6/7/2023 12:03 PM CDT -----  Send hi priority only bc I just sent another msg re this pt.   CMP is NOT needed.   Just a1c but link up the testosterone order which was ordered by another md

## 2023-06-13 ENCOUNTER — LAB VISIT (OUTPATIENT)
Dept: LAB | Facility: HOSPITAL | Age: 58
End: 2023-06-13
Attending: UROLOGY
Payer: COMMERCIAL

## 2023-06-13 DIAGNOSIS — R73.03 PREDIABETES: ICD-10-CM

## 2023-06-13 DIAGNOSIS — N52.01 ERECTILE DYSFUNCTION DUE TO ARTERIAL INSUFFICIENCY: ICD-10-CM

## 2023-06-13 LAB
ESTIMATED AVG GLUCOSE: 123 MG/DL (ref 68–131)
HBA1C MFR BLD: 5.9 % (ref 4–5.6)
TESTOST SERPL-MCNC: 176 NG/DL (ref 304–1227)

## 2023-06-13 PROCEDURE — 83036 HEMOGLOBIN GLYCOSYLATED A1C: CPT | Performed by: FAMILY MEDICINE

## 2023-06-13 PROCEDURE — 36415 COLL VENOUS BLD VENIPUNCTURE: CPT | Mod: PN | Performed by: UROLOGY

## 2023-06-13 PROCEDURE — 84403 ASSAY OF TOTAL TESTOSTERONE: CPT | Performed by: UROLOGY

## 2023-06-14 ENCOUNTER — TELEPHONE (OUTPATIENT)
Dept: UROLOGY | Facility: CLINIC | Age: 58
End: 2023-06-14
Payer: COMMERCIAL

## 2023-06-14 DIAGNOSIS — N52.01 ERECTILE DYSFUNCTION DUE TO ARTERIAL INSUFFICIENCY: Primary | ICD-10-CM

## 2023-06-15 ENCOUNTER — PATIENT MESSAGE (OUTPATIENT)
Dept: UROLOGY | Facility: CLINIC | Age: 58
End: 2023-06-15
Payer: COMMERCIAL

## 2023-06-16 ENCOUNTER — PATIENT MESSAGE (OUTPATIENT)
Dept: UROLOGY | Facility: CLINIC | Age: 58
End: 2023-06-16
Payer: COMMERCIAL

## 2023-06-18 ENCOUNTER — HOSPITAL ENCOUNTER (EMERGENCY)
Facility: HOSPITAL | Age: 58
Discharge: HOME | End: 2023-06-18
Attending: EMERGENCY MEDICINE
Payer: COMMERCIAL

## 2023-06-18 VITALS
OXYGEN SATURATION: 99 % | TEMPERATURE: 98 F | DIASTOLIC BLOOD PRESSURE: 81 MMHG | RESPIRATION RATE: 16 BRPM | SYSTOLIC BLOOD PRESSURE: 162 MMHG | HEART RATE: 63 BPM

## 2023-06-18 DIAGNOSIS — R10.13 EPIGASTRIC PAIN: ICD-10-CM

## 2023-06-18 DIAGNOSIS — K59.00 CONSTIPATION, UNSPECIFIED CONSTIPATION TYPE: Primary | ICD-10-CM

## 2023-06-18 LAB
ALBUMIN SERPL BCP-MCNC: 3.5 G/DL (ref 3.5–5.2)
ALP SERPL-CCNC: 55 U/L (ref 55–135)
ALT SERPL W/O P-5'-P-CCNC: 30 U/L (ref 10–44)
ANION GAP SERPL CALC-SCNC: 9 MMOL/L (ref 8–16)
AST SERPL-CCNC: 25 U/L (ref 10–40)
BACTERIA #/AREA URNS AUTO: ABNORMAL /HPF
BASOPHILS # BLD AUTO: 0.07 K/UL (ref 0–0.2)
BASOPHILS NFR BLD: 0.9 % (ref 0–1.9)
BILIRUB SERPL-MCNC: 0.3 MG/DL (ref 0.1–1)
BILIRUB UR QL STRIP: NEGATIVE
BUN SERPL-MCNC: 9 MG/DL (ref 6–20)
BUN SERPL-MCNC: 9 MG/DL (ref 6–30)
CALCIUM SERPL-MCNC: 9.7 MG/DL (ref 8.7–10.5)
CHLORIDE SERPL-SCNC: 102 MMOL/L (ref 95–110)
CHLORIDE SERPL-SCNC: 105 MMOL/L (ref 95–110)
CLARITY UR REFRACT.AUTO: CLEAR
CO2 SERPL-SCNC: 27 MMOL/L (ref 23–29)
COLOR UR AUTO: YELLOW
CREAT SERPL-MCNC: 0.8 MG/DL (ref 0.5–1.4)
CREAT SERPL-MCNC: 1 MG/DL (ref 0.5–1.4)
DIFFERENTIAL METHOD: ABNORMAL
EOSINOPHIL # BLD AUTO: 0.1 K/UL (ref 0–0.5)
EOSINOPHIL NFR BLD: 1.4 % (ref 0–8)
ERYTHROCYTE [DISTWIDTH] IN BLOOD BY AUTOMATED COUNT: 14.1 % (ref 11.5–14.5)
EST. GFR  (NO RACE VARIABLE): >60 ML/MIN/1.73 M^2
GLUCOSE SERPL-MCNC: 110 MG/DL (ref 70–110)
GLUCOSE SERPL-MCNC: 115 MG/DL (ref 70–110)
GLUCOSE UR QL STRIP: NEGATIVE
HCT VFR BLD AUTO: 39.9 % (ref 40–54)
HCT VFR BLD CALC: 42 %PCV (ref 36–54)
HGB BLD-MCNC: 12.7 G/DL (ref 14–18)
HGB UR QL STRIP: ABNORMAL
IMM GRANULOCYTES # BLD AUTO: 0.12 K/UL (ref 0–0.04)
IMM GRANULOCYTES NFR BLD AUTO: 1.6 % (ref 0–0.5)
KETONES UR QL STRIP: NEGATIVE
LEUKOCYTE ESTERASE UR QL STRIP: NEGATIVE
LIPASE SERPL-CCNC: 20 U/L (ref 4–60)
LYMPHOCYTES # BLD AUTO: 2.1 K/UL (ref 1–4.8)
LYMPHOCYTES NFR BLD: 27.3 % (ref 18–48)
MCH RBC QN AUTO: 23.4 PG (ref 27–31)
MCHC RBC AUTO-ENTMCNC: 31.8 G/DL (ref 32–36)
MCV RBC AUTO: 74 FL (ref 82–98)
MICROSCOPIC COMMENT: ABNORMAL
MONOCYTES # BLD AUTO: 0.6 K/UL (ref 0.3–1)
MONOCYTES NFR BLD: 7.7 % (ref 4–15)
NEUTROPHILS # BLD AUTO: 4.7 K/UL (ref 1.8–7.7)
NEUTROPHILS NFR BLD: 61.1 % (ref 38–73)
NITRITE UR QL STRIP: NEGATIVE
NRBC BLD-RTO: 0 /100 WBC
PH UR STRIP: 7 [PH] (ref 5–8)
PLATELET # BLD AUTO: 366 K/UL (ref 150–450)
PMV BLD AUTO: 9.4 FL (ref 9.2–12.9)
POC IONIZED CALCIUM: 1.25 MMOL/L (ref 1.06–1.42)
POC TCO2 (MEASURED): 30 MMOL/L (ref 23–29)
POTASSIUM BLD-SCNC: 4.3 MMOL/L (ref 3.5–5.1)
POTASSIUM SERPL-SCNC: 4.3 MMOL/L (ref 3.5–5.1)
PROT SERPL-MCNC: 7.1 G/DL (ref 6–8.4)
PROT UR QL STRIP: NEGATIVE
RBC # BLD AUTO: 5.43 M/UL (ref 4.6–6.2)
RBC #/AREA URNS AUTO: 18 /HPF (ref 0–4)
SAMPLE: ABNORMAL
SODIUM BLD-SCNC: 140 MMOL/L (ref 136–145)
SODIUM SERPL-SCNC: 141 MMOL/L (ref 136–145)
SP GR UR STRIP: >=1.03 (ref 1–1.03)
SQUAMOUS #/AREA URNS AUTO: 3 /HPF
TROPONIN I SERPL DL<=0.01 NG/ML-MCNC: <0.006 NG/ML (ref 0–0.03)
URN SPEC COLLECT METH UR: ABNORMAL
WBC # BLD AUTO: 7.69 K/UL (ref 3.9–12.7)
WBC #/AREA URNS AUTO: 1 /HPF (ref 0–5)

## 2023-06-18 PROCEDURE — 80053 COMPREHEN METABOLIC PANEL: CPT | Performed by: PHYSICIAN ASSISTANT

## 2023-06-18 PROCEDURE — 99284 EMERGENCY DEPT VISIT MOD MDM: CPT | Mod: ,,, | Performed by: EMERGENCY MEDICINE

## 2023-06-18 PROCEDURE — 83690 ASSAY OF LIPASE: CPT | Performed by: PHYSICIAN ASSISTANT

## 2023-06-18 PROCEDURE — 80047 BASIC METABLC PNL IONIZED CA: CPT

## 2023-06-18 PROCEDURE — 85025 COMPLETE CBC W/AUTO DIFF WBC: CPT | Performed by: PHYSICIAN ASSISTANT

## 2023-06-18 PROCEDURE — 93010 ELECTROCARDIOGRAM REPORT: CPT | Mod: ,,, | Performed by: INTERNAL MEDICINE

## 2023-06-18 PROCEDURE — 93010 EKG 12-LEAD: ICD-10-PCS | Mod: ,,, | Performed by: INTERNAL MEDICINE

## 2023-06-18 PROCEDURE — 25500020 PHARM REV CODE 255: Performed by: EMERGENCY MEDICINE

## 2023-06-18 PROCEDURE — 93005 ELECTROCARDIOGRAM TRACING: CPT

## 2023-06-18 PROCEDURE — 96360 HYDRATION IV INFUSION INIT: CPT | Mod: 59

## 2023-06-18 PROCEDURE — 25000003 PHARM REV CODE 250: Performed by: PHYSICIAN ASSISTANT

## 2023-06-18 PROCEDURE — 99284 PR EMERGENCY DEPT VISIT,LEVEL IV: ICD-10-PCS | Mod: ,,, | Performed by: EMERGENCY MEDICINE

## 2023-06-18 PROCEDURE — 99285 EMERGENCY DEPT VISIT HI MDM: CPT | Mod: 25

## 2023-06-18 PROCEDURE — 63600175 PHARM REV CODE 636 W HCPCS: Performed by: PHYSICIAN ASSISTANT

## 2023-06-18 PROCEDURE — 84484 ASSAY OF TROPONIN QUANT: CPT | Performed by: PHYSICIAN ASSISTANT

## 2023-06-18 PROCEDURE — 81001 URINALYSIS AUTO W/SCOPE: CPT | Performed by: PHYSICIAN ASSISTANT

## 2023-06-18 RX ORDER — FAMOTIDINE 20 MG/1
40 TABLET, FILM COATED ORAL
Status: COMPLETED | OUTPATIENT
Start: 2023-06-18 | End: 2023-06-18

## 2023-06-18 RX ORDER — MAG HYDROX/ALUMINUM HYD/SIMETH 200-200-20
15 SUSPENSION, ORAL (FINAL DOSE FORM) ORAL
Status: COMPLETED | OUTPATIENT
Start: 2023-06-18 | End: 2023-06-18

## 2023-06-18 RX ORDER — POLYETHYLENE GLYCOL 3350 17 G/17G
17 POWDER, FOR SOLUTION ORAL DAILY
Qty: 30 EACH | Refills: 0 | Status: SHIPPED | OUTPATIENT
Start: 2023-06-18 | End: 2023-07-18

## 2023-06-18 RX ORDER — FAMOTIDINE 40 MG/1
40 TABLET, FILM COATED ORAL DAILY
Qty: 10 TABLET | Refills: 0 | Status: SHIPPED | OUTPATIENT
Start: 2023-06-18 | End: 2023-06-29

## 2023-06-18 RX ADMIN — SODIUM CHLORIDE, POTASSIUM CHLORIDE, SODIUM LACTATE AND CALCIUM CHLORIDE 1000 ML: 600; 310; 30; 20 INJECTION, SOLUTION INTRAVENOUS at 10:06

## 2023-06-18 RX ADMIN — ALUMINUM HYDROXIDE, MAGNESIUM HYDROXIDE, AND SIMETHICONE 15 ML: 200; 200; 20 SUSPENSION ORAL at 10:06

## 2023-06-18 RX ADMIN — IOHEXOL 100 ML: 350 INJECTION, SOLUTION INTRAVENOUS at 10:06

## 2023-06-18 RX ADMIN — FAMOTIDINE 40 MG: 20 TABLET ORAL at 10:06

## 2023-06-19 NOTE — DISCHARGE INSTRUCTIONS
You were seen in the emergency department for epigastric abdominal pain.  Your CT scan did not show any emergent pathology today.  There was some moderate stool which is suggestive constipation.  Please stay hydrated increase your fiber intake and I have prescribed you MiraLax.  I have also prescribed you medications to help with reflux.  If you continue to have symptoms you may follow-up with your PCP.  I have also placed a GI referral as needed.  Return to ED sooner if you develop any new or worsening symptoms.

## 2023-06-19 NOTE — ED PROVIDER NOTES
Encounter Date: 6/18/2023       History     Chief Complaint   Patient presents with    Abdominal Pain     Midepigastric abd pain rated 9/10 started last night. Pt denies c/p, SOB, N/V/D, fevers and chills.     49-year-old male with past medical history of morbid obesity, hypertension, hyperlipidemia, DJD who presents emergency department for epigastric abdominal pain since last night.  States it started after eating and has been constant throughout the day.  States it waxes and wanes would intensity by rates it a 9/10 and describes it as sharp and nonradiating and worse with movement.  Was able to eat today and states it was not worse or better with food.  Denies any fevers/chills, diarrhea, nausea vomiting, chest pain or shortness of breath.  States he does get constipation and reports hard stools but had a normal bowel movement earlier today.  Has not taken anything for pain prior to arrival.    The history is provided by the patient.   Review of patient's allergies indicates:   Allergen Reactions    Cucumber fruit extract Hives and Itching     Past Medical History:   Diagnosis Date    Allergy     Blood clotting tendency     DJD (degenerative joint disease)     Hyperlipidemia     Low back strain, initial encounter 5/16/2019    Morbid obesity with BMI of 40.0-44.9, adult 3/29/2018    Obesity     Urticaria      Past Surgical History:   Procedure Laterality Date    ARTHROSCOPY OF KNEE Right     CHONDROPLASTY OF KNEE Left 1/4/2022    Procedure: CHONDROPLASTY, KNEE;  Surgeon: Idalia Mclean MD;  Location: [facility] OR;  Service: Orthopedics;  Laterality: Left;    COLONOSCOPY N/A 2/15/2019    Procedure: COLONOSCOPY;  Surgeon: Boris Carey MD;  Location: [facility] ENDO;  Service: Endoscopy;  Laterality: N/A;    COLONOSCOPY N/A 3/23/2023    Procedure: COLONOSCOPY;  Surgeon: Tristan Sorto MD;  Location: [facility] ENDO;  Service: Endoscopy;  Laterality: N/A;    ESOPHAGOGASTRODUODENOSCOPY N/A 2/15/2019    Procedure:  ESOPHAGOGASTRODUODENOSCOPY (EGD);  Surgeon: Boris Carey MD;  Location: [facility] ENDO;  Service: Endoscopy;  Laterality: N/A;    KNEE ARTHROSCOPY W/ MENISCECTOMY Left 1/4/2022    Procedure: ARTHROSCOPY, KNEE, WITH MEDIAL MENISCECTOMY;  Surgeon: Idalia Mclean MD;  Location: [facility] OR;  Service: Orthopedics;  Laterality: Left;    KNEE ARTHROSCOPY W/ PLICA EXCISION Left 1/4/2022    Procedure: EXCISION, PLICA, KNEE, ARTHROSCOPIC;  Surgeon: Idalia Mclean MD;  Location: [facility] OR;  Service: Orthopedics;  Laterality: Left;    KNEE DEBRIDEMENT  1/4/2022    Procedure: DEBRIDEMENT, KNEE;  Surgeon: Idalia Mclean MD;  Location: [facility] OR;  Service: Orthopedics;;    KNEE SURGERY Right     SYNOVECTOMY OF KNEE Left 1/4/2022    Procedure: PARTIAL SYNOVECTOMY, KNEE;  Surgeon: Idalia Mclean MD;  Location: [facility] OR;  Service: Orthopedics;  Laterality: Left;     Family History   Problem Relation Age of Onset    Asthma Mother     Heart disease Father     Asthma Brother     Colon cancer Neg Hx     Esophageal cancer Neg Hx     Rectal cancer Neg Hx     Stomach cancer Neg Hx     Ulcerative colitis Neg Hx     Irritable bowel syndrome Neg Hx     Crohn's disease Neg Hx     Celiac disease Neg Hx     Melanoma Neg Hx      Social History     Tobacco Use    Smoking status: Never    Smokeless tobacco: Never   Substance Use Topics    Alcohol use: No     Alcohol/week: 0.0 standard drinks    Drug use: No     Review of Systems   Constitutional:  Negative for chills and fever.   HENT:  Negative for sore throat.    Respiratory:  Negative for cough and shortness of breath.    Gastrointestinal:  Positive for abdominal pain and constipation. Negative for diarrhea, nausea and vomiting.   Genitourinary:  Negative for difficulty urinating and dysuria.   Musculoskeletal: Negative.    Skin: Negative.    Neurological:  Negative for weakness.   Psychiatric/Behavioral:  Negative for confusion.      Physical Exam     Initial Vitals [06/18/23 2116]   BP Pulse Resp Temp SpO2   134/88  87 20 98 °F (36.7 °C) 100 %      MAP       --         Physical Exam    Vitals reviewed.  Constitutional: He appears well-developed and well-nourished.   HENT:   Head: Normocephalic and atraumatic.   Eyes: Conjunctivae are normal. Pupils are equal, round, and reactive to light.   Neck: Neck supple.   Normal range of motion.  Cardiovascular:  Normal rate, regular rhythm, normal heart sounds and intact distal pulses.           Pulmonary/Chest: Breath sounds normal.   Abdominal: Abdomen is soft. Bowel sounds are normal. He exhibits no distension and no mass. There is no abdominal tenderness.   No CVA tenderness There is no rebound and no guarding.   Musculoskeletal:         General: Normal range of motion.      Cervical back: Normal range of motion and neck supple.     Neurological: He is alert and oriented to person, place, and time. GCS score is 15. GCS eye subscore is 4. GCS verbal subscore is 5. GCS motor subscore is 6.   Skin: Skin is warm and dry. Capillary refill takes less than 2 seconds.   Psychiatric: He has a normal mood and affect.       ED Course   Procedures  Labs Reviewed   CBC W/ AUTO DIFFERENTIAL - Abnormal; Notable for the following components:       Result Value    Hemoglobin 12.7 (*)     Hematocrit 39.9 (*)     MCV 74 (*)     MCH 23.4 (*)     MCHC 31.8 (*)     Immature Granulocytes 1.6 (*)     Immature Grans (Abs) 0.12 (*)     All other components within normal limits   URINALYSIS, REFLEX TO URINE CULTURE - Abnormal; Notable for the following components:    Specific Gravity, UA >=1.030 (*)     Occult Blood UA 1+ (*)     All other components within normal limits    Narrative:     Specimen Source->Urine   URINALYSIS MICROSCOPIC - Abnormal; Notable for the following components:    RBC, UA 18 (*)     All other components within normal limits    Narrative:     Specimen Source->Urine   ISTAT PROCEDURE - Abnormal; Notable for the following components:    POC Glucose 115 (*)     POC TCO2 (MEASURED) 30 (*)      All other components within normal limits   COMPREHENSIVE METABOLIC PANEL   LIPASE   TROPONIN I   ISTAT CHEM8          Imaging Results              CT Abdomen Pelvis With Contrast (Final result)  Result time 06/18/23 22:40:28      Final result by Galdino Cortez MD (06/18/23 22:40:28)                   Impression:      1. No acute abnormality  2. Hepatomegaly with hepatic steatosis.  3. Small fat containing umbilical hernia.  4. Possible constipation.      Electronically signed by: Galdino Cortez  Date:    06/18/2023  Time:    22:40               Narrative:    EXAMINATION:  CT ABDOMEN PELVIS WITH CONTRAST    CLINICAL HISTORY:  Epigastric pain;    TECHNIQUE:  Low dose axial images, sagittal and coronal reformations were obtained from the lung bases to the pubic symphysis following the IV administration of 100 mL of Omnipaque 350 .  Oral contrast was not administered.    COMPARISON:  04/04/2023    FINDINGS:  Abdomen:    - Lower thorax:    - Lung bases: No infiltrates and no nodules.    - Liver: The liver is enlarged with diffuse fatty infiltration.  No focal abnormality.    - Gallbladder: No calcified gallstones.    - Bile Ducts: No evidence of intra or extra hepatic biliary ductal dilation.    - Spleen: Negative.    - Kidneys: No stone, mass or hydronephrosis bilaterally.  No hydroureter bilaterally.    - Adrenals: Unremarkable.    - Pancreas: No mass or peripancreatic fat stranding.    - Retroperitoneum:  No significant adenopathy.    - Vascular: No abdominal aortic aneurysm.    - Abdominal wall:  Small fat containing umbilical hernia.    The appendix is within normal limits.    Pelvis:    Urinary bladder is within normal limits.    Bowel/Mesentery:    No evidence of bowel obstruction or inflammation.  Moderate retained feces throughout the colon.    Bones:  No acute osseous abnormality and no suspicious lytic or blastic lesion.                                       Medications   aluminum-magnesium  hydroxide-simethicone 200-200-20 mg/5 mL suspension 15 mL (15 mLs Oral Given 6/18/23 2203)   famotidine tablet 40 mg (40 mg Oral Given 6/18/23 2203)   lactated ringers bolus 1,000 mL (0 mLs Intravenous Stopped 6/18/23 2312)   iohexoL (OMNIPAQUE 350) injection 100 mL (100 mLs Intravenous Given 6/18/23 2218)     Medical Decision Making:   History:   Old Medical Records: I decided to obtain old medical records.  Initial Assessment:   57-year-old male presents emergency department for epigastric abdominal pain.  Differential Diagnosis:   Gastritis, GERD, constipation, SBO, UTI, electrolyte derangement, dehydration, pancreatitis, cholecystitis  Clinical Tests:   Lab Tests: Ordered and Reviewed  Radiological Study: Ordered and Reviewed  Medical Tests: Ordered and Reviewed  ED Management:  Normal sinus rhythm at a rate of 69, normal axis, normal intervals, no STEMI.  He is a benign abdominal exam.  Vital signs are reassuring.  I did obtain a CT which did not show any acute emergent pathology.  There was moderate stool volume.  No SBO, cholecystitis or appendicitis noted.  Lab work reassuring.  Noninfectious UA.  Discussed hydration, MiraLax and will discharge with a course of Pepcid and Bentyl.  Discussed GI follow-up if you continue to have symptoms.  Return ED precautions given.  Patient has epigastric abdominal pain.  EKG nonischemic and troponin negative doubt ACS.  He is a benign abdominal exam           ED Course as of 06/18/23 2321   Sun Jun 18, 2023 2233 Lipase: 20  Doubt pancreatitis [HJ]   2233 Troponin I: <0.006  Within normal limits [HJ]   2233 WBC: 7.69  No leukocytosis [HJ]   2233 Creatinine: 0.8  No HAYDE [HJ]   2233 Hemoglobin(!): 12.7  At baseline [HJ]      ED Course User Index  [HJ] Carie Gonzales PA-C                 Clinical Impression:   Final diagnoses:  [R10.13] Epigastric pain  [K59.00] Constipation, unspecified constipation type (Primary)        ED Disposition Condition    Discharge Stable          ED  Prescriptions       Medication Sig Dispense Start Date End Date Auth. Provider    famotidine (PEPCID) 40 MG tablet Take 1 tablet (40 mg total) by mouth once daily. for 10 days 10 tablet 6/18/2023 6/28/2023 Carie Gonzales PA-C    polyethylene glycol (GLYCOLAX) 17 gram PwPk Take 17 g by mouth once daily. 30 each 6/18/2023 7/18/2023 Carie Gonzales PA-C          Follow-up Information       Follow up With Specialties Details Why Contact Info Additional Information    Solo Chacon MD Family Medicine Schedule an appointment as soon as possible for a visit   [address]  [facility] [address]  [phone]       [facility] [address] Gastroenterology Schedule an appointment as soon as possible for a visit  As needed [address]  [facility] [id number] [phone] [facility] - Main Building, 4th Floor Please park in [facility] and take [facility] elevator             Carie Gonzales PA-C  06/18/23 2587

## 2023-06-19 NOTE — ED TRIAGE NOTES
Topher Cr, a 57 y.o. male presents to the ED w/ complaint of upper quadrant abdominal pain since yesterday evening. Reports frequent constipation. Denies fever or chills. Denies n/v/d.     Triage note:  Chief Complaint   Patient presents with    Abdominal Pain     Midepigastric abd pain rated 9/10 started last night. Pt denies c/p, SOB, N/V/D, fevers and chills.     Review of patient's allergies indicates:   Allergen Reactions    Cucumber fruit extract Hives and Itching     Past Medical History:   Diagnosis Date    Allergy     Blood clotting tendency     DJD (degenerative joint disease)     Hyperlipidemia     Low back strain, initial encounter 5/16/2019    Morbid obesity with BMI of 40.0-44.9, adult 3/29/2018    Obesity     Urticaria

## 2023-06-19 NOTE — ED NOTES
I-STAT Chem-8+ Results:   Value Reference Range   Sodium 140 136-145 mmol/L   Potassium  4.3 3.5-5.1 mmol/L   Chloride 102  mmol/L   Ionized Calcium 1.25 1.06-1.42 mmol/L   CO2 (measured) 30 23-29 mmol/L   Glucose 115  mg/dL   BUN 9 6-30 mg/dL   Creatinine 1.0 0.5-1.4 mg/dL   Hematocrit 42 36-54%

## 2023-06-27 ENCOUNTER — LAB VISIT (OUTPATIENT)
Dept: LAB | Facility: HOSPITAL | Age: 58
End: 2023-06-27
Attending: UROLOGY
Payer: COMMERCIAL

## 2023-06-27 DIAGNOSIS — N52.01 ERECTILE DYSFUNCTION DUE TO ARTERIAL INSUFFICIENCY: ICD-10-CM

## 2023-06-27 LAB
LH SERPL-ACNC: 1.3 MIU/ML (ref 0.6–12.1)
PROLACTIN SERPL IA-MCNC: 7.1 NG/ML (ref 3.5–19.4)
TESTOST SERPL-MCNC: 166 NG/DL (ref 304–1227)

## 2023-06-27 PROCEDURE — 84403 ASSAY OF TOTAL TESTOSTERONE: CPT | Performed by: UROLOGY

## 2023-06-27 PROCEDURE — 36415 COLL VENOUS BLD VENIPUNCTURE: CPT | Mod: PN | Performed by: UROLOGY

## 2023-06-27 PROCEDURE — 84146 ASSAY OF PROLACTIN: CPT | Performed by: UROLOGY

## 2023-06-27 PROCEDURE — 83002 ASSAY OF GONADOTROPIN (LH): CPT | Performed by: UROLOGY

## 2023-06-28 ENCOUNTER — TELEPHONE (OUTPATIENT)
Dept: UROLOGY | Facility: CLINIC | Age: 58
End: 2023-06-28
Payer: COMMERCIAL

## 2023-06-28 NOTE — TELEPHONE ENCOUNTER
Call placed to patient. Name and date of birth verified. Patient informed of the following:    Please notify T is low.  He should come in to discuss.  -Dr. Wylie    Patient appointment scheduled and noted in epic. Patient informed appointment details are noted in patient portal. Patient verbalized understanding.

## 2023-06-29 ENCOUNTER — OFFICE VISIT (OUTPATIENT)
Dept: PRIMARY CARE CLINIC | Facility: CLINIC | Age: 58
End: 2023-06-29
Payer: COMMERCIAL

## 2023-06-29 VITALS
TEMPERATURE: 98 F | SYSTOLIC BLOOD PRESSURE: 132 MMHG | WEIGHT: 315 LBS | BODY MASS INDEX: 44.1 KG/M2 | HEIGHT: 71 IN | HEART RATE: 76 BPM | DIASTOLIC BLOOD PRESSURE: 78 MMHG | OXYGEN SATURATION: 97 %

## 2023-06-29 DIAGNOSIS — E66.01 MORBID OBESITY DUE TO EXCESS CALORIES: ICD-10-CM

## 2023-06-29 DIAGNOSIS — I10 PRIMARY HYPERTENSION: Primary | ICD-10-CM

## 2023-06-29 PROCEDURE — 1159F PR MEDICATION LIST DOCUMENTED IN MEDICAL RECORD: ICD-10-PCS | Mod: CPTII,S$GLB,, | Performed by: FAMILY MEDICINE

## 2023-06-29 PROCEDURE — 99999 PR PBB SHADOW E&M-EST. PATIENT-LVL III: ICD-10-PCS | Mod: PBBFAC,,, | Performed by: FAMILY MEDICINE

## 2023-06-29 PROCEDURE — 3075F SYST BP GE 130 - 139MM HG: CPT | Mod: CPTII,S$GLB,, | Performed by: FAMILY MEDICINE

## 2023-06-29 PROCEDURE — 3008F PR BODY MASS INDEX (BMI) DOCUMENTED: ICD-10-PCS | Mod: CPTII,S$GLB,, | Performed by: FAMILY MEDICINE

## 2023-06-29 PROCEDURE — 3075F PR MOST RECENT SYSTOLIC BLOOD PRESS GE 130-139MM HG: ICD-10-PCS | Mod: CPTII,S$GLB,, | Performed by: FAMILY MEDICINE

## 2023-06-29 PROCEDURE — 3008F BODY MASS INDEX DOCD: CPT | Mod: CPTII,S$GLB,, | Performed by: FAMILY MEDICINE

## 2023-06-29 PROCEDURE — 3044F HG A1C LEVEL LT 7.0%: CPT | Mod: CPTII,S$GLB,, | Performed by: FAMILY MEDICINE

## 2023-06-29 PROCEDURE — 99214 PR OFFICE/OUTPT VISIT, EST, LEVL IV, 30-39 MIN: ICD-10-PCS | Mod: S$GLB,,, | Performed by: FAMILY MEDICINE

## 2023-06-29 PROCEDURE — 3078F DIAST BP <80 MM HG: CPT | Mod: CPTII,S$GLB,, | Performed by: FAMILY MEDICINE

## 2023-06-29 PROCEDURE — 3078F PR MOST RECENT DIASTOLIC BLOOD PRESSURE < 80 MM HG: ICD-10-PCS | Mod: CPTII,S$GLB,, | Performed by: FAMILY MEDICINE

## 2023-06-29 PROCEDURE — 4010F PR ACE/ARB THEARPY RXD/TAKEN: ICD-10-PCS | Mod: CPTII,S$GLB,, | Performed by: FAMILY MEDICINE

## 2023-06-29 PROCEDURE — 1160F PR REVIEW ALL MEDS BY PRESCRIBER/CLIN PHARMACIST DOCUMENTED: ICD-10-PCS | Mod: CPTII,S$GLB,, | Performed by: FAMILY MEDICINE

## 2023-06-29 PROCEDURE — 1159F MED LIST DOCD IN RCRD: CPT | Mod: CPTII,S$GLB,, | Performed by: FAMILY MEDICINE

## 2023-06-29 PROCEDURE — 99214 OFFICE O/P EST MOD 30 MIN: CPT | Mod: S$GLB,,, | Performed by: FAMILY MEDICINE

## 2023-06-29 PROCEDURE — 3044F PR MOST RECENT HEMOGLOBIN A1C LEVEL <7.0%: ICD-10-PCS | Mod: CPTII,S$GLB,, | Performed by: FAMILY MEDICINE

## 2023-06-29 PROCEDURE — 1160F RVW MEDS BY RX/DR IN RCRD: CPT | Mod: CPTII,S$GLB,, | Performed by: FAMILY MEDICINE

## 2023-06-29 PROCEDURE — 99999 PR PBB SHADOW E&M-EST. PATIENT-LVL III: CPT | Mod: PBBFAC,,, | Performed by: FAMILY MEDICINE

## 2023-06-29 PROCEDURE — 4010F ACE/ARB THERAPY RXD/TAKEN: CPT | Mod: CPTII,S$GLB,, | Performed by: FAMILY MEDICINE

## 2023-06-29 RX ORDER — HYDROCHLOROTHIAZIDE 12.5 MG/1
12.5 TABLET ORAL DAILY
Qty: 90 TABLET | Refills: 3 | Status: SHIPPED | OUTPATIENT
Start: 2023-06-29 | End: 2023-09-14 | Stop reason: SDUPTHER

## 2023-06-29 RX ORDER — CANDESARTAN 8 MG/1
TABLET ORAL
Qty: 90 TABLET | Refills: 3 | Status: SHIPPED | OUTPATIENT
Start: 2023-06-29 | End: 2023-12-18 | Stop reason: SDUPTHER

## 2023-06-29 RX ORDER — HYDROCHLOROTHIAZIDE 12.5 MG/1
12.5 TABLET ORAL DAILY
Qty: 90 TABLET | Refills: 3 | Status: SHIPPED | OUTPATIENT
Start: 2023-06-29 | End: 2023-06-29 | Stop reason: SDUPTHER

## 2023-06-29 RX ORDER — CANDESARTAN 8 MG/1
TABLET ORAL
Qty: 90 TABLET | Refills: 3 | Status: SHIPPED | OUTPATIENT
Start: 2023-06-29 | End: 2023-06-29 | Stop reason: SDUPTHER

## 2023-06-30 PROBLEM — E66.813 CLASS 3 SEVERE OBESITY DUE TO EXCESS CALORIES WITHOUT SERIOUS COMORBIDITY WITH BODY MASS INDEX (BMI) OF 45.0 TO 49.9 IN ADULT: Status: RESOLVED | Noted: 2020-09-02 | Resolved: 2023-06-30

## 2023-06-30 PROBLEM — E66.01 CLASS 3 SEVERE OBESITY DUE TO EXCESS CALORIES WITHOUT SERIOUS COMORBIDITY WITH BODY MASS INDEX (BMI) OF 45.0 TO 49.9 IN ADULT: Status: RESOLVED | Noted: 2020-09-02 | Resolved: 2023-06-30

## 2023-06-30 NOTE — PROGRESS NOTES
"    /78   Pulse 76   Temp 98 °F (36.7 °C)   Ht 5' 11" (1.803 m)   Wt (!) 151.7 kg (334 lb 7 oz)   SpO2 97%   BMI 46.64 kg/m²       ===========    Chief Complaint: No chief complaint on file.          ADI Cr is a 57 y.o. male     here for    Annual Wellness/Preventative Exam.  Health maintenance reviewed with patient in detail inc any recent labs and studies and needs for future screening labs.  Age-appropriate vaccines and other age-appropriate screening studies reviewed with patient in detail.  Sleep health reviewed with patient.  Skin health regarding possible skin cancer screening reviewed with patient.  General regularity of bowel movements and urinations reviewed with patient including any possibility of urine leakage.  Vision screening reviewed with patient.      Patient queried and denies any further complaints    Patient has MEDICAL HISTORY OF  Patient Active Problem List   Diagnosis    Abnormal EKG    Morbid obesity due to excess calories    DJD (degenerative joint disease), ankle and foot, right    Erectile dysfunction due to arterial insufficiency    Umbilical hernia without obstruction and without gangrene    Decreased ROM of left knee    Decreased strength involving knee joint    Lactose intolerance    BPH with urinary obstruction       SURGICAL AND MEDICAL HISTORY: updated and reviewed.  Past Surgical History:   Procedure Laterality Date    ARTHROSCOPY OF KNEE Right     CHONDROPLASTY OF KNEE Left 1/4/2022    Procedure: CHONDROPLASTY, KNEE;  Surgeon: Idalia Mclean MD;  Location: [facility] OR;  Service: Orthopedics;  Laterality: Left;    COLONOSCOPY N/A 2/15/2019    Procedure: COLONOSCOPY;  Surgeon: Boris Carey MD;  Location: [facility] ENDO;  Service: Endoscopy;  Laterality: N/A;    COLONOSCOPY N/A 3/23/2023    Procedure: COLONOSCOPY;  Surgeon: Tristan Sorto MD;  Location: [facility] ENDO;  Service: Endoscopy;  Laterality: N/A;    ESOPHAGOGASTRODUODENOSCOPY N/A 2/15/2019    Procedure: " "ESOPHAGOGASTRODUODENOSCOPY (EGD);  Surgeon: Boris Carey MD;  Location: [facility] ENDO;  Service: Endoscopy;  Laterality: N/A;    KNEE ARTHROSCOPY W/ MENISCECTOMY Left 1/4/2022    Procedure: ARTHROSCOPY, KNEE, WITH MEDIAL MENISCECTOMY;  Surgeon: Idalia Mclean MD;  Location: [facility] OR;  Service: Orthopedics;  Laterality: Left;    KNEE ARTHROSCOPY W/ PLICA EXCISION Left 1/4/2022    Procedure: EXCISION, PLICA, KNEE, ARTHROSCOPIC;  Surgeon: Idalia Mclean MD;  Location: [facility] OR;  Service: Orthopedics;  Laterality: Left;    KNEE DEBRIDEMENT  1/4/2022    Procedure: DEBRIDEMENT, KNEE;  Surgeon: Idalia Mclean MD;  Location: [facility] OR;  Service: Orthopedics;;    KNEE SURGERY Right     SYNOVECTOMY OF KNEE Left 1/4/2022    Procedure: PARTIAL SYNOVECTOMY, KNEE;  Surgeon: Idalia Mclean MD;  Location: [facility] OR;  Service: Orthopedics;  Laterality: Left;     ALLERGIES updated and reviewed.  Review of patient's allergies indicates:   Allergen Reactions    Cucumber fruit extract Hives and Itching       CURRENT OUTPATIENT MEDICATIONS updated and reviewed    Current Outpatient Medications:     candesartan (ATACAND) 8 MG tablet, ONE PO DAILY FOR HYPERTENSION, Disp: 90 tablet, Rfl: 3    hydroCHLOROthiazide (HYDRODIURIL) 12.5 MG Tab, Take 1 tablet (12.5 mg total) by mouth once daily., Disp: 90 tablet, Rfl: 3    polyethylene glycol (GLYCOLAX) 17 gram PwPk, Take 17 g by mouth once daily. (Patient not taking: Reported on 6/29/2023), Disp: 30 each, Rfl: 0    tadalafiL (CIALIS) 20 MG Tab, Take 1 tablet (20 mg total) by mouth once daily. Take 1 hour before intercourse (Patient not taking: Reported on 6/29/2023), Disp: 10 tablet, Rfl: 11    Review of Systems    /78   Pulse 76   Temp 98 °F (36.7 °C)   Ht 5' 11" (1.803 m)   Wt (!) 151.7 kg (334 lb 7 oz)   SpO2 97%   BMI 46.64 kg/m²   Physical Exam    ASSESSMENT/PLAN  Diagnoses and all orders for this visit:    Primary hypertension    Morbid obesity due to excess calories    Other orders  -     " Discontinue: candesartan (ATACAND) 8 MG tablet; ONE PO DAILY FOR HYPERTENSION  -     Discontinue: hydroCHLOROthiazide (HYDRODIURIL) 12.5 MG Tab; Take 1 tablet (12.5 mg total) by mouth once daily.  -     hydroCHLOROthiazide (HYDRODIURIL) 12.5 MG Tab; Take 1 tablet (12.5 mg total) by mouth once daily.  -     candesartan (ATACAND) 8 MG tablet; ONE PO DAILY FOR HYPERTENSION            All lab results over past 2 years available reviewed inc labs and any cardiology or radiology studies.  Any new prescription medications gone over in detail including reason for taking the medication, the general mechanism of action, most common possible side effects and possible costs, etcetera.    Chronic conditions updated. Other than changes or additions as above, cont current medications and maintain follow-up with specialists if indicated.     Solo Chacon MD  A dictation device was used to produce this document. Use of such devices sometimes results in grammatical errors or replacement of words that sound similarly.

## 2023-07-10 ENCOUNTER — OFFICE VISIT (OUTPATIENT)
Dept: UROLOGY | Facility: CLINIC | Age: 58
End: 2023-07-10
Payer: COMMERCIAL

## 2023-07-10 VITALS
DIASTOLIC BLOOD PRESSURE: 79 MMHG | WEIGHT: 315 LBS | BODY MASS INDEX: 44.1 KG/M2 | HEIGHT: 71 IN | SYSTOLIC BLOOD PRESSURE: 137 MMHG | HEART RATE: 66 BPM

## 2023-07-10 DIAGNOSIS — N13.8 BPH WITH URINARY OBSTRUCTION: ICD-10-CM

## 2023-07-10 DIAGNOSIS — N52.01 ERECTILE DYSFUNCTION DUE TO ARTERIAL INSUFFICIENCY: Primary | ICD-10-CM

## 2023-07-10 DIAGNOSIS — E29.1 MALE HYPOGONADISM: ICD-10-CM

## 2023-07-10 DIAGNOSIS — N40.1 BPH WITH URINARY OBSTRUCTION: ICD-10-CM

## 2023-07-10 DIAGNOSIS — I10 PRIMARY HYPERTENSION: ICD-10-CM

## 2023-07-10 PROCEDURE — 1159F MED LIST DOCD IN RCRD: CPT | Mod: CPTII,S$GLB,, | Performed by: UROLOGY

## 2023-07-10 PROCEDURE — 3078F PR MOST RECENT DIASTOLIC BLOOD PRESSURE < 80 MM HG: ICD-10-PCS | Mod: CPTII,S$GLB,, | Performed by: UROLOGY

## 2023-07-10 PROCEDURE — 3075F SYST BP GE 130 - 139MM HG: CPT | Mod: CPTII,S$GLB,, | Performed by: UROLOGY

## 2023-07-10 PROCEDURE — 3078F DIAST BP <80 MM HG: CPT | Mod: CPTII,S$GLB,, | Performed by: UROLOGY

## 2023-07-10 PROCEDURE — 3008F PR BODY MASS INDEX (BMI) DOCUMENTED: ICD-10-PCS | Mod: CPTII,S$GLB,, | Performed by: UROLOGY

## 2023-07-10 PROCEDURE — 1160F RVW MEDS BY RX/DR IN RCRD: CPT | Mod: CPTII,S$GLB,, | Performed by: UROLOGY

## 2023-07-10 PROCEDURE — 99214 PR OFFICE/OUTPT VISIT, EST, LEVL IV, 30-39 MIN: ICD-10-PCS | Mod: S$GLB,,, | Performed by: UROLOGY

## 2023-07-10 PROCEDURE — 3075F PR MOST RECENT SYSTOLIC BLOOD PRESS GE 130-139MM HG: ICD-10-PCS | Mod: CPTII,S$GLB,, | Performed by: UROLOGY

## 2023-07-10 PROCEDURE — 99214 OFFICE O/P EST MOD 30 MIN: CPT | Mod: S$GLB,,, | Performed by: UROLOGY

## 2023-07-10 PROCEDURE — 1159F PR MEDICATION LIST DOCUMENTED IN MEDICAL RECORD: ICD-10-PCS | Mod: CPTII,S$GLB,, | Performed by: UROLOGY

## 2023-07-10 PROCEDURE — 1160F PR REVIEW ALL MEDS BY PRESCRIBER/CLIN PHARMACIST DOCUMENTED: ICD-10-PCS | Mod: CPTII,S$GLB,, | Performed by: UROLOGY

## 2023-07-10 PROCEDURE — 99999 PR PBB SHADOW E&M-EST. PATIENT-LVL IV: CPT | Mod: PBBFAC,,, | Performed by: UROLOGY

## 2023-07-10 PROCEDURE — 3044F HG A1C LEVEL LT 7.0%: CPT | Mod: CPTII,S$GLB,, | Performed by: UROLOGY

## 2023-07-10 PROCEDURE — 4010F ACE/ARB THERAPY RXD/TAKEN: CPT | Mod: CPTII,S$GLB,, | Performed by: UROLOGY

## 2023-07-10 PROCEDURE — 3008F BODY MASS INDEX DOCD: CPT | Mod: CPTII,S$GLB,, | Performed by: UROLOGY

## 2023-07-10 PROCEDURE — 99999 PR PBB SHADOW E&M-EST. PATIENT-LVL IV: ICD-10-PCS | Mod: PBBFAC,,, | Performed by: UROLOGY

## 2023-07-10 PROCEDURE — 3044F PR MOST RECENT HEMOGLOBIN A1C LEVEL <7.0%: ICD-10-PCS | Mod: CPTII,S$GLB,, | Performed by: UROLOGY

## 2023-07-10 PROCEDURE — 4010F PR ACE/ARB THEARPY RXD/TAKEN: ICD-10-PCS | Mod: CPTII,S$GLB,, | Performed by: UROLOGY

## 2023-07-10 RX ORDER — TESTOSTERONE 20.25 MG/1.25G
GEL TOPICAL
Qty: 1 EACH | Refills: 5 | Status: SHIPPED | OUTPATIENT
Start: 2023-07-10 | End: 2023-11-06

## 2023-07-10 NOTE — PROGRESS NOTES
CHIEF COMPLAINT:    Mr. Cr is a 57 y.o. male presenting with ED.    PRESENTING ILLNESS:    Topher Cr is a 57 y.o. male who c/o ED.    He's tried and failed Viagra and Cialis.    His T is low.  He's here to discuss. Has low libido and ED.    He has LUTS.  + decreased FOS.  Is pleased with how he voids.    REVIEW OF SYSTEMS:    Topher Cr denies headache, blurred vision, fever, nausea, vomiting, chills, abdominal pain, chest pain, sore throat, bleeding per rectum, cough, SOB, recent loss of consciousness, recent mental status changes, seizures, dizziness, or upper or lower extremity weakness.    MENDOZA  1. 1  2. 3  3. 3  4. 3  5. 1      PATIENT HISTORY:    Past Medical History:   Diagnosis Date    Allergy     Blood clotting tendency     DJD (degenerative joint disease)     Hyperlipidemia     Low back strain, initial encounter 5/16/2019    Morbid obesity with BMI of 40.0-44.9, adult 3/29/2018    Obesity     Primary hypertension 7/10/2023    Urticaria        Past Surgical History:   Procedure Laterality Date    ARTHROSCOPY OF KNEE Right     CHONDROPLASTY OF KNEE Left 1/4/2022    Procedure: CHONDROPLASTY, KNEE;  Surgeon: Idalia Mclean MD;  Location: [facility] OR;  Service: Orthopedics;  Laterality: Left;    COLONOSCOPY N/A 2/15/2019    Procedure: COLONOSCOPY;  Surgeon: Boris Carey MD;  Location: [facility];  Service: Endoscopy;  Laterality: N/A;    COLONOSCOPY N/A 3/23/2023    Procedure: COLONOSCOPY;  Surgeon: Tristan Sorto MD;  Location: [facility];  Service: Endoscopy;  Laterality: N/A;    ESOPHAGOGASTRODUODENOSCOPY N/A 2/15/2019    Procedure: ESOPHAGOGASTRODUODENOSCOPY (EGD);  Surgeon: Boris Carey MD;  Location: [facility] ENDO;  Service: Endoscopy;  Laterality: N/A;    KNEE ARTHROSCOPY W/ MENISCECTOMY Left 1/4/2022    Procedure: ARTHROSCOPY, KNEE, WITH MEDIAL MENISCECTOMY;  Surgeon: Idalia Mclean MD;  Location: [facility] OR;  Service: Orthopedics;  Laterality: Left;    KNEE ARTHROSCOPY W/ PLICA EXCISION Left 1/4/2022     Procedure: EXCISION, PLICA, KNEE, ARTHROSCOPIC;  Surgeon: Idalia Mclean MD;  Location: [facility] OR;  Service: Orthopedics;  Laterality: Left;    KNEE DEBRIDEMENT  1/4/2022    Procedure: DEBRIDEMENT, KNEE;  Surgeon: Idalia Mclean MD;  Location: [facility] OR;  Service: Orthopedics;;    KNEE SURGERY Right     SYNOVECTOMY OF KNEE Left 1/4/2022    Procedure: PARTIAL SYNOVECTOMY, KNEE;  Surgeon: Idalia Mclean MD;  Location: [facility] OR;  Service: Orthopedics;  Laterality: Left;       Family History   Problem Relation Age of Onset    Asthma Mother     Heart disease Father     Asthma Brother     Colon cancer Neg Hx     Esophageal cancer Neg Hx     Rectal cancer Neg Hx     Stomach cancer Neg Hx     Ulcerative colitis Neg Hx     Irritable bowel syndrome Neg Hx     Crohn's disease Neg Hx     Celiac disease Neg Hx     Melanoma Neg Hx        Social History     Socioeconomic History    Marital status:    Tobacco Use    Smoking status: Never    Smokeless tobacco: Never   Substance and Sexual Activity    Alcohol use: No     Alcohol/week: 0.0 standard drinks    Drug use: No   Social History Narrative    Works in [employer], nonsmoker       Allergies:  Cucumber fruit extract    Medications:    Current Outpatient Medications:     candesartan (ATACAND) 8 MG tablet, ONE PO DAILY FOR HYPERTENSION, Disp: 90 tablet, Rfl: 3    hydroCHLOROthiazide (HYDRODIURIL) 12.5 MG Tab, Take 1 tablet (12.5 mg total) by mouth once daily., Disp: 30 tablet, Rfl: 1    hydroCHLOROthiazide (HYDRODIURIL) 12.5 MG Tab, Take 1 tablet (12.5 mg total) by mouth once daily., Disp: 90 tablet, Rfl: 3    polyethylene glycol (GLYCOLAX) 17 gram PwPk, Take 17 g by mouth once daily. (Patient not taking: Reported on 6/29/2023), Disp: 30 each, Rfl: 0    tadalafiL (CIALIS) 20 MG Tab, Take 1 tablet (20 mg total) by mouth once daily. Take 1 hour before intercourse (Patient not taking: Reported on 6/29/2023), Disp: 10 tablet, Rfl: 11    PHYSICAL EXAMINATION:    The patient generally  appears in good health, is appropriately interactive, and is in no apparent distress.     Eyes: anicteric sclerae, moist conjunctivae; no lid-lag; PERRLA     HENT: Atraumatic; oropharynx clear with moist mucous membranes and no mucosal ulcerations;normal hard and soft palate.  No evidence of lymphadenopathy.    Neck: Trachea midline.  No thyromegaly.    Skin: No lesions.    Mental: Cooperative with normal affect.  Is oriented to time, place, and person.    Neuro: Grossly intact.    Chest: Normal inspiratory effort.   No accessory muscles.  No audible wheezes.  Respirations symmetric on inspiration and expiration.    Heart: Regular rhythm.      Abdomen:  Soft, non-tender. No masses or organomegaly. Bladder is not palpable. No evidence of flank discomfort. No evidence of inguinal hernia.    Genitourinary: The penis is not circumcised with no evidence of plaques or induration. The urethral meatus is normal. The testes, epididymides, and cord structures are normal in size and contour bilaterally. The scrotum is normal in size and contour.    Normal anal sphincter tone. No rectal mass.    The prostate is smooth. Normal landmarks. Lateral sulci. Median furrow intact.  No nodularity or induration. Seminal vesicles are normal.    Extremities: No clubbing, cyanosis, or edema      LABS:      Lab Results   Component Value Date    PSA 1.0 12/23/2022    PSA 0.73 09/27/2021    PSA 0.38 07/30/2020       IMPRESSION:    Encounter Diagnoses   Name Primary?    Erectile dysfunction due to arterial insufficiency Yes    BPH with urinary obstruction     Primary hypertension    HTN, stable      PLAN:    1. Will observe his LUTS as they don't bother him.  2. Discussed options for his ED.  He'd like to try Cialis with a normal T.  3. Discussed the risks and benefits of testosterone replacement today.  This included possible cardiac risks.  He would like to try this.  Will check a T in 2 weeks and adjust the dose of TRT if necessary.  He can  then RTC 3 months with T, PSA, CBC, hepatic panel, lipid panel.  A new Rx for Androgel 1.62% was given today.      Copy to:

## 2023-07-24 ENCOUNTER — LAB VISIT (OUTPATIENT)
Dept: LAB | Facility: HOSPITAL | Age: 58
End: 2023-07-24
Attending: UROLOGY
Payer: COMMERCIAL

## 2023-07-24 ENCOUNTER — TELEPHONE (OUTPATIENT)
Dept: UROLOGY | Facility: CLINIC | Age: 58
End: 2023-07-24
Payer: COMMERCIAL

## 2023-07-24 DIAGNOSIS — E29.1 MALE HYPOGONADISM: ICD-10-CM

## 2023-07-24 DIAGNOSIS — E29.1 MALE HYPOGONADISM: Primary | ICD-10-CM

## 2023-07-24 LAB — TESTOST SERPL-MCNC: 189 NG/DL (ref 304–1227)

## 2023-07-24 PROCEDURE — 84403 ASSAY OF TOTAL TESTOSTERONE: CPT | Performed by: UROLOGY

## 2023-07-24 PROCEDURE — 36415 COLL VENOUS BLD VENIPUNCTURE: CPT | Mod: PN | Performed by: UROLOGY

## 2023-07-24 NOTE — TELEPHONE ENCOUNTER
Please notify T is low.  Verify he used androgel correctly and on the day of his draw.  If so, increase to 3 pumps/day.  Recheck T in 1 week.

## 2023-07-25 ENCOUNTER — PATIENT MESSAGE (OUTPATIENT)
Dept: UROLOGY | Facility: CLINIC | Age: 58
End: 2023-07-25
Payer: COMMERCIAL

## 2023-07-25 NOTE — TELEPHONE ENCOUNTER
Call placed to patient. Name and date of birth verified. Patient informed of the following:    Please notify T is low.  Verify he used androgel correctly and on the day of his draw.  If so, increase to 3 pumps/day.  Recheck T in 1 week.  -Dr. Wylie    Patient stated he applies 2 pumps to shoulders, however, has missed a few days of application. Patient educated on the importance of following MD orders  to receive the benefits of the medication. Patient instructed to apply 2 pumps to shoulders daily and reminded to apply prior to lab draw. Patient appointment scheduled and noted in [facility]. Patient informed appointment details are noted in patient portal. Patient verbalized understanding.

## 2023-08-07 ENCOUNTER — TELEPHONE (OUTPATIENT)
Dept: UROLOGY | Facility: CLINIC | Age: 58
End: 2023-08-07
Payer: COMMERCIAL

## 2023-08-07 ENCOUNTER — LAB VISIT (OUTPATIENT)
Dept: LAB | Facility: HOSPITAL | Age: 58
End: 2023-08-07
Attending: UROLOGY
Payer: COMMERCIAL

## 2023-08-07 DIAGNOSIS — E29.1 MALE HYPOGONADISM: ICD-10-CM

## 2023-08-07 DIAGNOSIS — E29.1 MALE HYPOGONADISM: Primary | ICD-10-CM

## 2023-08-07 LAB — TESTOST SERPL-MCNC: 193 NG/DL (ref 304–1227)

## 2023-08-07 PROCEDURE — 36415 COLL VENOUS BLD VENIPUNCTURE: CPT | Mod: PN | Performed by: UROLOGY

## 2023-08-07 PROCEDURE — 84403 ASSAY OF TOTAL TESTOSTERONE: CPT | Performed by: UROLOGY

## 2023-08-08 NOTE — TELEPHONE ENCOUNTER
Patient  notified that his T is low.  Verified that  he used androgel correctly and on the day of his draw.  instructed to increase to 3 pumps/day.  Recheck T in 1 week. Scheduled repeat lab.

## 2023-08-19 ENCOUNTER — LAB VISIT (OUTPATIENT)
Dept: LAB | Facility: HOSPITAL | Age: 58
End: 2023-08-19
Attending: UROLOGY
Payer: COMMERCIAL

## 2023-08-19 DIAGNOSIS — E29.1 MALE HYPOGONADISM: ICD-10-CM

## 2023-08-19 LAB — TESTOST SERPL-MCNC: 236 NG/DL (ref 304–1227)

## 2023-08-19 PROCEDURE — 36415 COLL VENOUS BLD VENIPUNCTURE: CPT | Performed by: UROLOGY

## 2023-08-19 PROCEDURE — 84403 ASSAY OF TOTAL TESTOSTERONE: CPT | Performed by: UROLOGY

## 2023-08-21 ENCOUNTER — TELEPHONE (OUTPATIENT)
Dept: UROLOGY | Facility: CLINIC | Age: 58
End: 2023-08-21
Payer: COMMERCIAL

## 2023-08-21 DIAGNOSIS — E29.1 MALE HYPOGONADISM: Primary | ICD-10-CM

## 2023-08-21 NOTE — TELEPHONE ENCOUNTER
Patient notified that his T is low.  Verified that he used androgel correctly and on the day of his draw.  instructed to increase to 4 pumps/day.  scheduled repeat lab  in 1 week.

## 2023-08-21 NOTE — TELEPHONE ENCOUNTER
Please notify T is low.  Verify he used androgel correctly and on the day of his draw.  If so, increase to 4 pumps/day.  Recheck T in 1 week.

## 2023-08-21 NOTE — TELEPHONE ENCOUNTER
----- Message from Linda Chacon sent at 8/21/2023  2:08 PM CDT -----  Regarding: advise; returning missed call  Contact: PT @ [phone]  Pt is returning a missed call from someone in the office and is asking for a return call back soon. Thanks.    Reason for call: pt states that someone in the office just called; no message was left    Patient's DX: n/a    Patient requesting call back or [facility] msg: call back.

## 2023-09-02 ENCOUNTER — LAB VISIT (OUTPATIENT)
Dept: LAB | Facility: HOSPITAL | Age: 58
End: 2023-09-02
Attending: UROLOGY
Payer: COMMERCIAL

## 2023-09-02 DIAGNOSIS — E29.1 MALE HYPOGONADISM: ICD-10-CM

## 2023-09-02 LAB — TESTOST SERPL-MCNC: 386 NG/DL (ref 304–1227)

## 2023-09-02 PROCEDURE — 36415 COLL VENOUS BLD VENIPUNCTURE: CPT | Performed by: UROLOGY

## 2023-09-02 PROCEDURE — 84403 ASSAY OF TOTAL TESTOSTERONE: CPT | Performed by: UROLOGY

## 2023-09-05 ENCOUNTER — TELEPHONE (OUTPATIENT)
Dept: UROLOGY | Facility: CLINIC | Age: 58
End: 2023-09-05
Payer: COMMERCIAL

## 2023-09-07 ENCOUNTER — PATIENT MESSAGE (OUTPATIENT)
Dept: UROLOGY | Facility: CLINIC | Age: 58
End: 2023-09-07
Payer: COMMERCIAL

## 2023-09-10 ENCOUNTER — PATIENT MESSAGE (OUTPATIENT)
Dept: PRIMARY CARE CLINIC | Facility: CLINIC | Age: 58
End: 2023-09-10
Payer: COMMERCIAL

## 2023-09-11 NOTE — TELEPHONE ENCOUNTER
"LOV 6/29/23  Annual 12/22/22  Latest colonoscopy 3/23/23    Spoke with the patient. The patient states that for the last two weeks he has been sporadically experiencing bloody stools.  He states that it is a small amount of blood. He denies any other symptoms and says "he does not feel out of the ordinary".     "

## 2023-09-14 ENCOUNTER — OFFICE VISIT (OUTPATIENT)
Dept: PRIMARY CARE CLINIC | Facility: CLINIC | Age: 58
End: 2023-09-14
Payer: COMMERCIAL

## 2023-09-14 VITALS
SYSTOLIC BLOOD PRESSURE: 136 MMHG | HEIGHT: 71 IN | TEMPERATURE: 98 F | HEART RATE: 73 BPM | BODY MASS INDEX: 44.1 KG/M2 | OXYGEN SATURATION: 99 % | WEIGHT: 315 LBS | DIASTOLIC BLOOD PRESSURE: 78 MMHG

## 2023-09-14 DIAGNOSIS — K62.5 BRBPR (BRIGHT RED BLOOD PER RECTUM): Primary | ICD-10-CM

## 2023-09-14 DIAGNOSIS — I10 PRIMARY HYPERTENSION: ICD-10-CM

## 2023-09-14 PROCEDURE — 3008F PR BODY MASS INDEX (BMI) DOCUMENTED: ICD-10-PCS | Mod: CPTII,S$GLB,, | Performed by: FAMILY MEDICINE

## 2023-09-14 PROCEDURE — 99999 PR PBB SHADOW E&M-EST. PATIENT-LVL IV: CPT | Mod: PBBFAC,,, | Performed by: FAMILY MEDICINE

## 2023-09-14 PROCEDURE — 4010F ACE/ARB THERAPY RXD/TAKEN: CPT | Mod: CPTII,S$GLB,, | Performed by: FAMILY MEDICINE

## 2023-09-14 PROCEDURE — 1159F MED LIST DOCD IN RCRD: CPT | Mod: CPTII,S$GLB,, | Performed by: FAMILY MEDICINE

## 2023-09-14 PROCEDURE — 99999 PR PBB SHADOW E&M-EST. PATIENT-LVL IV: ICD-10-PCS | Mod: PBBFAC,,, | Performed by: FAMILY MEDICINE

## 2023-09-14 PROCEDURE — 3078F DIAST BP <80 MM HG: CPT | Mod: CPTII,S$GLB,, | Performed by: FAMILY MEDICINE

## 2023-09-14 PROCEDURE — 3008F BODY MASS INDEX DOCD: CPT | Mod: CPTII,S$GLB,, | Performed by: FAMILY MEDICINE

## 2023-09-14 PROCEDURE — 4010F PR ACE/ARB THEARPY RXD/TAKEN: ICD-10-PCS | Mod: CPTII,S$GLB,, | Performed by: FAMILY MEDICINE

## 2023-09-14 PROCEDURE — 99214 PR OFFICE/OUTPT VISIT, EST, LEVL IV, 30-39 MIN: ICD-10-PCS | Mod: S$GLB,,, | Performed by: FAMILY MEDICINE

## 2023-09-14 PROCEDURE — 1160F RVW MEDS BY RX/DR IN RCRD: CPT | Mod: CPTII,S$GLB,, | Performed by: FAMILY MEDICINE

## 2023-09-14 PROCEDURE — 3075F PR MOST RECENT SYSTOLIC BLOOD PRESS GE 130-139MM HG: ICD-10-PCS | Mod: CPTII,S$GLB,, | Performed by: FAMILY MEDICINE

## 2023-09-14 PROCEDURE — 3075F SYST BP GE 130 - 139MM HG: CPT | Mod: CPTII,S$GLB,, | Performed by: FAMILY MEDICINE

## 2023-09-14 PROCEDURE — 99214 OFFICE O/P EST MOD 30 MIN: CPT | Mod: S$GLB,,, | Performed by: FAMILY MEDICINE

## 2023-09-14 PROCEDURE — 3044F PR MOST RECENT HEMOGLOBIN A1C LEVEL <7.0%: ICD-10-PCS | Mod: CPTII,S$GLB,, | Performed by: FAMILY MEDICINE

## 2023-09-14 PROCEDURE — 1159F PR MEDICATION LIST DOCUMENTED IN MEDICAL RECORD: ICD-10-PCS | Mod: CPTII,S$GLB,, | Performed by: FAMILY MEDICINE

## 2023-09-14 PROCEDURE — 3078F PR MOST RECENT DIASTOLIC BLOOD PRESSURE < 80 MM HG: ICD-10-PCS | Mod: CPTII,S$GLB,, | Performed by: FAMILY MEDICINE

## 2023-09-14 PROCEDURE — 3044F HG A1C LEVEL LT 7.0%: CPT | Mod: CPTII,S$GLB,, | Performed by: FAMILY MEDICINE

## 2023-09-14 PROCEDURE — 1160F PR REVIEW ALL MEDS BY PRESCRIBER/CLIN PHARMACIST DOCUMENTED: ICD-10-PCS | Mod: CPTII,S$GLB,, | Performed by: FAMILY MEDICINE

## 2023-09-14 NOTE — PROGRESS NOTES
"    /78 (BP Location: Left arm, Patient Position: Sitting, BP Method: Large (Manual))   Pulse 73   Temp 98.2 °F (36.8 °C) (Oral)   Ht 5' 11" (1.803 m)   Wt (!) 157 kg (346 lb 2 oz)   SpO2 99%   BMI 48.27 kg/m²       ===========    Chief Complaint: No chief complaint on file.          HPI    Topher Cr is a 58 y.o. male     here for    Some BRBPR noticed on tissue only for about 2 weeks.    Taking psyllium fiber tablets and stays hydrated well and eats fiber bars and usually BMs are not hard. Not with painful BMs recently    No gross hematochezia and no melena.    Had normal colonoscopy March of this year.          Patient queried and denies any further complaints    Patient has MEDICAL HISTORY OF  Patient Active Problem List   Diagnosis    Abnormal EKG    Morbid obesity due to excess calories    DJD (degenerative joint disease), ankle and foot, right    Erectile dysfunction due to arterial insufficiency    Umbilical hernia without obstruction and without gangrene    Decreased ROM of left knee    Decreased strength involving knee joint    Lactose intolerance    BPH with urinary obstruction    Primary hypertension    BRBPR (bright red blood per rectum)       SURGICAL AND MEDICAL HISTORY: updated and reviewed.  Past Surgical History:   Procedure Laterality Date    ARTHROSCOPY OF KNEE Right     CHONDROPLASTY OF KNEE Left 1/4/2022    Procedure: CHONDROPLASTY, KNEE;  Surgeon: Idalia Mclean MD;  Location: [facility];  Service: Orthopedics;  Laterality: Left;    COLONOSCOPY N/A 2/15/2019    Procedure: COLONOSCOPY;  Surgeon: Boris Carey MD;  Location: [facility];  Service: Endoscopy;  Laterality: N/A;    COLONOSCOPY N/A 3/23/2023    Procedure: COLONOSCOPY;  Surgeon: Tristan Sorto MD;  Location: [facility];  Service: Endoscopy;  Laterality: N/A;    ESOPHAGOGASTRODUODENOSCOPY N/A 2/15/2019    Procedure: ESOPHAGOGASTRODUODENOSCOPY (EGD);  Surgeon: Boris Carey MD;  Location: [facility];  Service: Endoscopy;  " Laterality: N/A;    KNEE ARTHROSCOPY W/ MENISCECTOMY Left 1/4/2022    Procedure: ARTHROSCOPY, KNEE, WITH MEDIAL MENISCECTOMY;  Surgeon: Idalia Mclean MD;  Location: [facility] OR;  Service: Orthopedics;  Laterality: Left;    KNEE ARTHROSCOPY W/ PLICA EXCISION Left 1/4/2022    Procedure: EXCISION, PLICA, KNEE, ARTHROSCOPIC;  Surgeon: Idalia Mclean MD;  Location: [facility] OR;  Service: Orthopedics;  Laterality: Left;    KNEE DEBRIDEMENT  1/4/2022    Procedure: DEBRIDEMENT, KNEE;  Surgeon: Idalia Mclean MD;  Location: [facility] OR;  Service: Orthopedics;;    KNEE SURGERY Right     SYNOVECTOMY OF KNEE Left 1/4/2022    Procedure: PARTIAL SYNOVECTOMY, KNEE;  Surgeon: Idalia Mclean MD;  Location: [facility] OR;  Service: Orthopedics;  Laterality: Left;     ALLERGIES updated and reviewed.  Review of patient's allergies indicates:   Allergen Reactions    Cucumber fruit extract Hives and Itching       CURRENT OUTPATIENT MEDICATIONS updated and reviewed    Current Outpatient Medications:     candesartan (ATACAND) 8 MG tablet, ONE PO DAILY FOR HYPERTENSION, Disp: 90 tablet, Rfl: 3    hydroCHLOROthiazide (HYDRODIURIL) 12.5 MG Tab, Take 1 tablet (12.5 mg total) by mouth once daily., Disp: 30 tablet, Rfl: 1    tadalafiL (CIALIS) 20 MG Tab, Take 1 tablet (20 mg total) by mouth once daily. Take 1 hour before intercourse, Disp: 10 tablet, Rfl: 11    testosterone (ANDROGEL) 20.25 mg/1.25 gram (1.62 %) GlPm, Apply 2 pumps to shoulders daily, Disp: 1 each, Rfl: 5    Review of Systems   Constitutional:  Negative for activity change, appetite change, chills, diaphoresis, fatigue, fever and unexpected weight change.   HENT:  Negative for congestion, ear discharge, ear pain, facial swelling, hearing loss, nosebleeds, postnasal drip, rhinorrhea, sinus pressure, sneezing, sore throat, tinnitus, trouble swallowing and voice change.    Eyes:  Negative for photophobia, pain, discharge, redness, itching and visual disturbance.   Respiratory:  Negative for cough, chest  "tightness, shortness of breath and wheezing.    Cardiovascular:  Negative for chest pain, palpitations and leg swelling.   Gastrointestinal:  Positive for blood in stool (On tissue only). Negative for abdominal distention, abdominal pain, anal bleeding, constipation, diarrhea, nausea, rectal pain and vomiting.   Endocrine: Negative for cold intolerance, heat intolerance, polydipsia, polyphagia and polyuria.   Genitourinary:  Negative for difficulty urinating, dysuria and flank pain.   Musculoskeletal:  Negative for arthralgias, back pain, joint swelling, myalgias and neck pain.   Skin:  Negative for rash.   Neurological:  Negative for dizziness, tremors, seizures, syncope, speech difficulty, weakness, light-headedness, numbness and headaches.   Psychiatric/Behavioral:  Negative for behavioral problems, confusion, decreased concentration, dysphoric mood, sleep disturbance and suicidal ideas. The patient is not nervous/anxious and is not hyperactive.        /78 (BP Location: Left arm, Patient Position: Sitting, BP Method: Large (Manual))   Pulse 73   Temp 98.2 °F (36.8 °C) (Oral)   Ht 5' 11" (1.803 m)   Wt (!) 157 kg (346 lb 2 oz)   SpO2 99%   BMI 48.27 kg/m²   Physical Exam  Vitals and nursing note reviewed.   Constitutional:       General: He is not in acute distress.     Appearance: Normal appearance. He is obese. He is not toxic-appearing.   Genitourinary:     Rectum: External hemorrhoid present.          Comments: I do not appreciate an anal fissure.  I do not see a thrombosed external hemorrhoid.  Some skin tag from external hemorrhoid apparent.  No evidence of bleeding externally.  Digital rectal exam deferred.  Neurological:      Mental Status: He is alert.   Psychiatric:         Behavior: Behavior normal.         ASSESSMENT/PLAN  Diagnoses and all orders for this visit:    BRBPR (bright red blood per rectum)  -     Ambulatory referral/consult to Colorectal Surgery; Future    Primary " hypertension          Stay hydrated.  Stool softener daily to b.i.d. for a few days in case he has a small fissure or hemorrhoid that is irritated.  Avoid spicy foods.  Hold tadalafil AKA Cialis for at least 2 weeks.  This could exacerbate some bleeding because of the venous dilatation but he has not taken this in a couple of weeks.    Reassurance given recent normal colonoscopy but will refer because this has been going on for a couple of weeks now.    Blood pressure under good control overall.  Systolic needs some improvement.  Continue candesartan 8 mg and HCTZ 12.5.  Follow-up in 3-6 months.  We will consider increasing the candesartan to 16 if BP is still around 135 systolic.  Diastolic value is good.      All lab results over past 2 years available reviewed inc labs and any cardiology or radiology studies.  Any new prescription medications gone over in detail including reason for taking the medication, the general mechanism of action, most common possible side effects and possible costs, etcetera.    Chronic conditions updated. Other than changes or additions as above, cont current medications and maintain follow-up with specialists if indicated.     Solo Chacon MD  A dictation device was used to produce this document. Use of such devices sometimes results in grammatical errors or replacement of words that sound similarly.

## 2023-09-15 PROBLEM — K62.5 BRBPR (BRIGHT RED BLOOD PER RECTUM): Status: ACTIVE | Noted: 2023-09-15

## 2023-10-07 ENCOUNTER — LAB VISIT (OUTPATIENT)
Dept: LAB | Facility: HOSPITAL | Age: 58
End: 2023-10-07
Attending: UROLOGY
Payer: COMMERCIAL

## 2023-10-07 DIAGNOSIS — E29.1 MALE HYPOGONADISM: ICD-10-CM

## 2023-10-07 LAB
ALBUMIN SERPL BCP-MCNC: 3.8 G/DL (ref 3.5–5.2)
ALP SERPL-CCNC: 39 U/L (ref 55–135)
ALT SERPL W/O P-5'-P-CCNC: 38 U/L (ref 10–44)
AST SERPL-CCNC: 34 U/L (ref 10–40)
BASOPHILS # BLD AUTO: 0.1 K/UL (ref 0–0.2)
BASOPHILS NFR BLD: 1.5 % (ref 0–1.9)
BILIRUB DIRECT SERPL-MCNC: 0.2 MG/DL (ref 0.1–0.3)
BILIRUB SERPL-MCNC: 0.5 MG/DL (ref 0.1–1)
CHOLEST SERPL-MCNC: 166 MG/DL (ref 120–199)
CHOLEST/HDLC SERPL: 4.4 {RATIO} (ref 2–5)
COMPLEXED PSA SERPL-MCNC: 1.6 NG/ML (ref 0–4)
DIFFERENTIAL METHOD: ABNORMAL
EOSINOPHIL # BLD AUTO: 0.2 K/UL (ref 0–0.5)
EOSINOPHIL NFR BLD: 2.8 % (ref 0–8)
ERYTHROCYTE [DISTWIDTH] IN BLOOD BY AUTOMATED COUNT: 15.2 % (ref 11.5–14.5)
HCT VFR BLD AUTO: 42.2 % (ref 40–54)
HDLC SERPL-MCNC: 38 MG/DL (ref 40–75)
HDLC SERPL: 22.9 % (ref 20–50)
HGB BLD-MCNC: 12.9 G/DL (ref 14–18)
IMM GRANULOCYTES # BLD AUTO: 0.08 K/UL (ref 0–0.04)
IMM GRANULOCYTES NFR BLD AUTO: 1.2 % (ref 0–0.5)
LDLC SERPL CALC-MCNC: 109.6 MG/DL (ref 63–159)
LYMPHOCYTES # BLD AUTO: 2.8 K/UL (ref 1–4.8)
LYMPHOCYTES NFR BLD: 40.9 % (ref 18–48)
MCH RBC QN AUTO: 23 PG (ref 27–31)
MCHC RBC AUTO-ENTMCNC: 30.6 G/DL (ref 32–36)
MCV RBC AUTO: 75 FL (ref 82–98)
MONOCYTES # BLD AUTO: 0.6 K/UL (ref 0.3–1)
MONOCYTES NFR BLD: 9 % (ref 4–15)
NEUTROPHILS # BLD AUTO: 3 K/UL (ref 1.8–7.7)
NEUTROPHILS NFR BLD: 44.6 % (ref 38–73)
NONHDLC SERPL-MCNC: 128 MG/DL
NRBC BLD-RTO: 0 /100 WBC
PLATELET # BLD AUTO: 365 K/UL (ref 150–450)
PMV BLD AUTO: 9.7 FL (ref 9.2–12.9)
PROT SERPL-MCNC: 7.2 G/DL (ref 6–8.4)
RBC # BLD AUTO: 5.61 M/UL (ref 4.6–6.2)
TESTOST SERPL-MCNC: 283 NG/DL (ref 304–1227)
TRIGL SERPL-MCNC: 92 MG/DL (ref 30–150)
WBC # BLD AUTO: 6.8 K/UL (ref 3.9–12.7)

## 2023-10-07 PROCEDURE — 84153 ASSAY OF PSA TOTAL: CPT | Performed by: UROLOGY

## 2023-10-07 PROCEDURE — 85025 COMPLETE CBC W/AUTO DIFF WBC: CPT | Performed by: UROLOGY

## 2023-10-07 PROCEDURE — 80061 LIPID PANEL: CPT | Performed by: UROLOGY

## 2023-10-07 PROCEDURE — 84403 ASSAY OF TOTAL TESTOSTERONE: CPT | Performed by: UROLOGY

## 2023-10-07 PROCEDURE — 80076 HEPATIC FUNCTION PANEL: CPT | Performed by: UROLOGY

## 2023-10-07 PROCEDURE — 36415 COLL VENOUS BLD VENIPUNCTURE: CPT | Performed by: UROLOGY

## 2023-10-11 ENCOUNTER — OFFICE VISIT (OUTPATIENT)
Dept: UROLOGY | Facility: CLINIC | Age: 58
End: 2023-10-11
Payer: COMMERCIAL

## 2023-10-11 VITALS
SYSTOLIC BLOOD PRESSURE: 124 MMHG | HEIGHT: 71 IN | DIASTOLIC BLOOD PRESSURE: 63 MMHG | HEART RATE: 70 BPM | BODY MASS INDEX: 44.1 KG/M2 | WEIGHT: 315 LBS

## 2023-10-11 DIAGNOSIS — E29.1 MALE HYPOGONADISM: Primary | ICD-10-CM

## 2023-10-11 DIAGNOSIS — I10 PRIMARY HYPERTENSION: ICD-10-CM

## 2023-10-11 DIAGNOSIS — N52.01 ERECTILE DYSFUNCTION DUE TO ARTERIAL INSUFFICIENCY: ICD-10-CM

## 2023-10-11 DIAGNOSIS — N40.1 BPH WITH URINARY OBSTRUCTION: ICD-10-CM

## 2023-10-11 DIAGNOSIS — N13.8 BPH WITH URINARY OBSTRUCTION: ICD-10-CM

## 2023-10-11 PROCEDURE — 3078F PR MOST RECENT DIASTOLIC BLOOD PRESSURE < 80 MM HG: ICD-10-PCS | Mod: CPTII,S$GLB,, | Performed by: UROLOGY

## 2023-10-11 PROCEDURE — 99999 PR PBB SHADOW E&M-EST. PATIENT-LVL III: CPT | Mod: PBBFAC,,, | Performed by: UROLOGY

## 2023-10-11 PROCEDURE — 3078F DIAST BP <80 MM HG: CPT | Mod: CPTII,S$GLB,, | Performed by: UROLOGY

## 2023-10-11 PROCEDURE — 1159F MED LIST DOCD IN RCRD: CPT | Mod: CPTII,S$GLB,, | Performed by: UROLOGY

## 2023-10-11 PROCEDURE — 3074F PR MOST RECENT SYSTOLIC BLOOD PRESSURE < 130 MM HG: ICD-10-PCS | Mod: CPTII,S$GLB,, | Performed by: UROLOGY

## 2023-10-11 PROCEDURE — 3074F SYST BP LT 130 MM HG: CPT | Mod: CPTII,S$GLB,, | Performed by: UROLOGY

## 2023-10-11 PROCEDURE — 99214 PR OFFICE/OUTPT VISIT, EST, LEVL IV, 30-39 MIN: ICD-10-PCS | Mod: S$GLB,,, | Performed by: UROLOGY

## 2023-10-11 PROCEDURE — 99214 OFFICE O/P EST MOD 30 MIN: CPT | Mod: S$GLB,,, | Performed by: UROLOGY

## 2023-10-11 PROCEDURE — 4010F ACE/ARB THERAPY RXD/TAKEN: CPT | Mod: CPTII,S$GLB,, | Performed by: UROLOGY

## 2023-10-11 PROCEDURE — 1160F RVW MEDS BY RX/DR IN RCRD: CPT | Mod: CPTII,S$GLB,, | Performed by: UROLOGY

## 2023-10-11 PROCEDURE — 3044F HG A1C LEVEL LT 7.0%: CPT | Mod: CPTII,S$GLB,, | Performed by: UROLOGY

## 2023-10-11 PROCEDURE — 1159F PR MEDICATION LIST DOCUMENTED IN MEDICAL RECORD: ICD-10-PCS | Mod: CPTII,S$GLB,, | Performed by: UROLOGY

## 2023-10-11 PROCEDURE — 4010F PR ACE/ARB THEARPY RXD/TAKEN: ICD-10-PCS | Mod: CPTII,S$GLB,, | Performed by: UROLOGY

## 2023-10-11 PROCEDURE — 3044F PR MOST RECENT HEMOGLOBIN A1C LEVEL <7.0%: ICD-10-PCS | Mod: CPTII,S$GLB,, | Performed by: UROLOGY

## 2023-10-11 PROCEDURE — 1160F PR REVIEW ALL MEDS BY PRESCRIBER/CLIN PHARMACIST DOCUMENTED: ICD-10-PCS | Mod: CPTII,S$GLB,, | Performed by: UROLOGY

## 2023-10-11 PROCEDURE — 99999 PR PBB SHADOW E&M-EST. PATIENT-LVL III: ICD-10-PCS | Mod: PBBFAC,,, | Performed by: UROLOGY

## 2023-10-11 PROCEDURE — 3008F BODY MASS INDEX DOCD: CPT | Mod: CPTII,S$GLB,, | Performed by: UROLOGY

## 2023-10-11 PROCEDURE — 3008F PR BODY MASS INDEX (BMI) DOCUMENTED: ICD-10-PCS | Mod: CPTII,S$GLB,, | Performed by: UROLOGY

## 2023-10-11 NOTE — PROGRESS NOTES
CHIEF COMPLAINT:    Mr. Cr is a 58 y.o. male presenting with ED.    PRESENTING ILLNESS:    Topher Cr is a 58 y.o. male who c/o ED.    He's tried and failed Viagra and Cialis.    He has hypogonadism.  Is on androgel 1.62% using 4 pumps.  While on TRT, he didn't notice a benefit.    He has LUTS.  + decreased FOS.  Is pleased with how he voids.    REVIEW OF SYSTEMS:    Topher Cr denies headache, blurred vision, fever, nausea, vomiting, chills, abdominal pain, chest pain, sore throat, bleeding per rectum, cough, SOB, recent loss of consciousness, recent mental status changes, seizures, dizziness, or upper or lower extremity weakness.    MENDOZA  1. 1  2. 1  3. 1  4. 1  5. 1     PATIENT HISTORY:    Past Medical History:   Diagnosis Date    Allergy     Blood clotting tendency     DJD (degenerative joint disease)     Hyperlipidemia     Low back strain, initial encounter 5/16/2019    Morbid obesity with BMI of 40.0-44.9, adult 3/29/2018    Obesity     Primary hypertension 7/10/2023    Urticaria        Past Surgical History:   Procedure Laterality Date    ARTHROSCOPY OF KNEE Right     CHONDROPLASTY OF KNEE Left 1/4/2022    Procedure: CHONDROPLASTY, KNEE;  Surgeon: Idalia Mclean MD;  Location: [facility];  Service: Orthopedics;  Laterality: Left;    COLONOSCOPY N/A 2/15/2019    Procedure: COLONOSCOPY;  Surgeon: Boris Carey MD;  Location: [facility];  Service: Endoscopy;  Laterality: N/A;    COLONOSCOPY N/A 3/23/2023    Procedure: COLONOSCOPY;  Surgeon: Tristan Sorto MD;  Location: [facility];  Service: Endoscopy;  Laterality: N/A;    ESOPHAGOGASTRODUODENOSCOPY N/A 2/15/2019    Procedure: ESOPHAGOGASTRODUODENOSCOPY (EGD);  Surgeon: Boris Carey MD;  Location: [facility] ENDO;  Service: Endoscopy;  Laterality: N/A;    KNEE ARTHROSCOPY W/ MENISCECTOMY Left 1/4/2022    Procedure: ARTHROSCOPY, KNEE, WITH MEDIAL MENISCECTOMY;  Surgeon: Idalia Mclean MD;  Location: [facility] OR;  Service: Orthopedics;  Laterality: Left;    KNEE  ARTHROSCOPY W/ PLICA EXCISION Left 1/4/2022    Procedure: EXCISION, PLICA, KNEE, ARTHROSCOPIC;  Surgeon: Idalia Mclean MD;  Location: [facility] OR;  Service: Orthopedics;  Laterality: Left;    KNEE DEBRIDEMENT  1/4/2022    Procedure: DEBRIDEMENT, KNEE;  Surgeon: Idalia Mclean MD;  Location: [facility] OR;  Service: Orthopedics;;    KNEE SURGERY Right     SYNOVECTOMY OF KNEE Left 1/4/2022    Procedure: PARTIAL SYNOVECTOMY, KNEE;  Surgeon: Idalia Mclean MD;  Location: [facility] OR;  Service: Orthopedics;  Laterality: Left;       Family History   Problem Relation Age of Onset    Asthma Mother     Heart disease Father     Asthma Brother     Colon cancer Neg Hx     Esophageal cancer Neg Hx     Rectal cancer Neg Hx     Stomach cancer Neg Hx     Ulcerative colitis Neg Hx     Irritable bowel syndrome Neg Hx     Crohn's disease Neg Hx     Celiac disease Neg Hx     Melanoma Neg Hx        Social History     Socioeconomic History    Marital status:    Tobacco Use    Smoking status: Never    Smokeless tobacco: Never   Substance and Sexual Activity    Alcohol use: No     Alcohol/week: 0.0 standard drinks of alcohol    Drug use: No   Social History Narrative    Works in Law Enforcement, nonsmoker     Social Determinants of Health     Financial Resource Strain: Low Risk  (7/31/2020)    Overall Financial Resource Strain (CARDIA)     Difficulty of Paying Living Expenses: Not hard at all   Food Insecurity: No Food Insecurity (7/31/2020)    Hunger Vital Sign     Worried About Running Out of Food in the Last Year: Never true     Ran Out of Food in the Last Year: Never true   Transportation Needs: No Transportation Needs (7/31/2020)    PRAPARE - Transportation     Lack of Transportation (Medical): No     Lack of Transportation (Non-Medical): No   Stress: No Stress Concern Present (7/31/2020)    [employer] - Occupational Stress Questionnaire     Feeling of Stress : Not at all   Social Connections: Unknown (7/31/2020)     Social Connection and Isolation Panel [NHANES]     Frequency of Communication with Friends and Family: More than three times a week     Attends Club or Organization Meetings: Never     Marital Status:        Allergies:  Cucumber fruit extract    Medications:    Current Outpatient Medications:     candesartan (ATACAND) 8 MG tablet, ONE PO DAILY FOR HYPERTENSION, Disp: 90 tablet, Rfl: 3    hydroCHLOROthiazide (HYDRODIURIL) 12.5 MG Tab, Take 1 tablet (12.5 mg total) by mouth once daily., Disp: 30 tablet, Rfl: 1    tadalafiL (CIALIS) 20 MG Tab, Take 1 tablet (20 mg total) by mouth once daily. Take 1 hour before intercourse, Disp: 10 tablet, Rfl: 11    testosterone (ANDROGEL) 20.25 mg/1.25 gram (1.62 %) GlPm, Apply 2 pumps to shoulders daily, Disp: 1 each, Rfl: 5    PHYSICAL EXAMINATION:    The patient generally appears in good health, is appropriately interactive, and is in no apparent distress.     Eyes: anicteric sclerae, moist conjunctivae; no lid-lag; PERRLA     HENT: Atraumatic; oropharynx clear with moist mucous membranes and no mucosal ulcerations;normal hard and soft palate.  No evidence of lymphadenopathy.    Neck: Trachea midline.  No thyromegaly.    Skin: No lesions.    Mental: Cooperative with normal affect.  Is oriented to time, place, and person.    Neuro: Grossly intact.    Chest: Normal inspiratory effort.   No accessory muscles.  No audible wheezes.  Respirations symmetric on inspiration and expiration.    Heart: Regular rhythm.      Abdomen:  Soft, non-tender. No masses or organomegaly. Bladder is not palpable. No evidence of flank discomfort. No evidence of inguinal hernia.    Genitourinary: The penis is not circumcised with no evidence of plaques or induration. The urethral meatus is normal. The testes, epididymides, and cord structures are normal in size and contour bilaterally. The scrotum is normal in size and contour.    Normal anal sphincter tone. No rectal mass.    The prostate is  smooth. Normal landmarks. Lateral sulci. Median furrow intact.  No nodularity or induration. Seminal vesicles are normal.    Extremities: No clubbing, cyanosis, or edema      LABS:      Lab Results   Component Value Date    PSA 1.0 12/23/2022    PSA 0.73 09/27/2021    PSA 0.38 07/30/2020    PSADIAG 1.6 10/07/2023       IMPRESSION:    Encounter Diagnoses   Name Primary?    Male hypogonadism Yes    Erectile dysfunction due to arterial insufficiency     BPH with urinary obstruction     Primary hypertension    HTN, stable      PLAN:    1. Will observe his LUTS as they don't bother him.  2. Discussed options for his severe ED (affected by above comorbidities).  He'd like to think about ICI vs IPP.   Was given info on the IPP website.  3. Can stop TRT as he didn't get a benefit.  4. He will call with a decision regarding ICI vs IPP.    Copy to:

## 2023-10-17 NOTE — PROGRESS NOTES
CRS Office Visit History and Physical    Referring Md:   Solo Chacon Md  [address] [place] [address]    SUBJECTIVE:     Chief Complaint: Rectal bleeding    History of Present Illness:  The patient is new patient to this practice.   Course is as follows:  Patient is a 58 y.o. male presents with bright red blood with BM.  Symptoms have been present for 2 months. No pain. Happens a few times per month.  On daily fiber gummies  Spends < 5min on toilet at a time.  Has a daily BM.  No straining.  Previous anorectal procedures: No  Blood thinners: No    Last Colonoscopy: Aime 2023 (Normal)  Family history of colorectal cancer or IBD: None.    Review of patient's allergies indicates:   Allergen Reactions    Cucumber fruit extract Hives and Itching       Past Medical History:   Diagnosis Date    Allergy     Blood clotting tendency     DJD (degenerative joint disease)     Hyperlipidemia     Low back strain, initial encounter 5/16/2019    Morbid obesity with BMI of 40.0-44.9, adult 3/29/2018    Obesity     Primary hypertension 7/10/2023    Urticaria      Past Surgical History:   Procedure Laterality Date    ARTHROSCOPY OF KNEE Right     CHONDROPLASTY OF KNEE Left 1/4/2022    Procedure: CHONDROPLASTY, KNEE;  Surgeon: Idalia Mclean MD;  Location: [facility];  Service: Orthopedics;  Laterality: Left;    COLONOSCOPY N/A 2/15/2019    Procedure: COLONOSCOPY;  Surgeon: Boris Carey MD;  Location: [facility];  Service: Endoscopy;  Laterality: N/A;    COLONOSCOPY N/A 3/23/2023    Procedure: COLONOSCOPY;  Surgeon: Tristan Sorto MD;  Location: [facility];  Service: Endoscopy;  Laterality: N/A;    ESOPHAGOGASTRODUODENOSCOPY N/A 2/15/2019    Procedure: ESOPHAGOGASTRODUODENOSCOPY (EGD);  Surgeon: Boris Carey MD;  Location: [facility] ENDO;  Service: Endoscopy;  Laterality: N/A;    KNEE ARTHROSCOPY W/ MENISCECTOMY Left 1/4/2022    Procedure: ARTHROSCOPY, KNEE, WITH MEDIAL MENISCECTOMY;  Surgeon: Idalia Mclean MD;   Location: [facility] OR;  Service: Orthopedics;  Laterality: Left;    KNEE ARTHROSCOPY W/ PLICA EXCISION Left 1/4/2022    Procedure: EXCISION, PLICA, KNEE, ARTHROSCOPIC;  Surgeon: Idalia Mclean MD;  Location: [facility] OR;  Service: Orthopedics;  Laterality: Left;    KNEE DEBRIDEMENT  1/4/2022    Procedure: DEBRIDEMENT, KNEE;  Surgeon: Idalia Mclean MD;  Location: [facility] OR;  Service: Orthopedics;;    KNEE SURGERY Right     SYNOVECTOMY OF KNEE Left 1/4/2022    Procedure: PARTIAL SYNOVECTOMY, KNEE;  Surgeon: Idalia Mclean MD;  Location: [facility] OR;  Service: Orthopedics;  Laterality: Left;     Family History   Problem Relation Age of Onset    Asthma Mother     Heart disease Father     Asthma Brother     Colon cancer Neg Hx     Esophageal cancer Neg Hx     Rectal cancer Neg Hx     Stomach cancer Neg Hx     Ulcerative colitis Neg Hx     Irritable bowel syndrome Neg Hx     Crohn's disease Neg Hx     Celiac disease Neg Hx     Melanoma Neg Hx      Social History     Tobacco Use    Smoking status: Never    Smokeless tobacco: Never   Substance Use Topics    Alcohol use: No     Alcohol/week: 0.0 standard drinks of alcohol    Drug use: No        Review of Systems:  ROS    OBJECTIVE:     Vital Signs (Most Recent)  BP (!) 145/79   Pulse 70   Wt (!) 156 kg (343 lb 14.7 oz)   BMI 47.97 kg/m²     Physical Exam:  General: Black or  male in no distress   Neuro: Alert and oriented x 4.  Moves all extremities.     HEENT: No icterus.  Trachea midline  Respiratory: Respirations are even and unlabored  Cardiac: Regular rate  Extremities: Warm dry and intact  Skin: No rashes    Anorectal:   External exam: Perianal skin healthy, no significant external hemorrhoids, no fissure, deep anal canal  Digital exam revealed normal tone. No masses.  No pain.  No blood.    Anoscopy:  Verbal consent was obtained.   Clear plastic anoscope inserted.    Grade II/III hemorrhoids seen.    Rubber Band Ligation:  Suction applicator was placed above the dentate  line.   Rubber bands were deployed in the right anterior position.    Patient tolerated the procedure well.       ASSESSMENT/PLAN:     57yo M w/ intermittent rectal bleeding, s/p RBL today    The patient was instructed to:  Increase water intake to at least 8-10 glasses of water per day.  Take a daily fiber supplement (Konsyl, Benefiber, Metamucil) and increase dietary intake to 20-30 grams/day.  Avoid straining or spending >5min/event with bowel movements.  Follow-up in clinic in 6-8 weeks.    Libertad Gudino MD  Staff Surgeon, Colon and Rectal Surgery  [facility]

## 2023-10-18 ENCOUNTER — OFFICE VISIT (OUTPATIENT)
Dept: SURGERY | Facility: CLINIC | Age: 58
End: 2023-10-18
Attending: COLON & RECTAL SURGERY
Payer: COMMERCIAL

## 2023-10-18 VITALS
DIASTOLIC BLOOD PRESSURE: 79 MMHG | BODY MASS INDEX: 47.97 KG/M2 | HEART RATE: 70 BPM | WEIGHT: 315 LBS | SYSTOLIC BLOOD PRESSURE: 145 MMHG

## 2023-10-18 DIAGNOSIS — K64.9 HEMORRHOIDS, UNSPECIFIED HEMORRHOID TYPE: Primary | ICD-10-CM

## 2023-10-18 DIAGNOSIS — K62.5 BRBPR (BRIGHT RED BLOOD PER RECTUM): ICD-10-CM

## 2023-10-18 PROCEDURE — 99999 PR PBB SHADOW E&M-EST. PATIENT-LVL III: ICD-10-PCS | Mod: PBBFAC,,, | Performed by: COLON & RECTAL SURGERY

## 2023-10-18 PROCEDURE — 4010F PR ACE/ARB THEARPY RXD/TAKEN: ICD-10-PCS | Mod: CPTII,S$GLB,, | Performed by: COLON & RECTAL SURGERY

## 2023-10-18 PROCEDURE — 99999 PR PBB SHADOW E&M-EST. PATIENT-LVL III: CPT | Mod: PBBFAC,,, | Performed by: COLON & RECTAL SURGERY

## 2023-10-18 PROCEDURE — 1159F MED LIST DOCD IN RCRD: CPT | Mod: CPTII,S$GLB,, | Performed by: COLON & RECTAL SURGERY

## 2023-10-18 PROCEDURE — 3044F PR MOST RECENT HEMOGLOBIN A1C LEVEL <7.0%: ICD-10-PCS | Mod: CPTII,S$GLB,, | Performed by: COLON & RECTAL SURGERY

## 2023-10-18 PROCEDURE — 46221 PR HEMORRHOIDECTOMY INTERNAL RUBBER BAND LIGATIONS: ICD-10-PCS | Mod: S$GLB,,, | Performed by: COLON & RECTAL SURGERY

## 2023-10-18 PROCEDURE — 3044F HG A1C LEVEL LT 7.0%: CPT | Mod: CPTII,S$GLB,, | Performed by: COLON & RECTAL SURGERY

## 2023-10-18 PROCEDURE — 99204 PR OFFICE/OUTPT VISIT, NEW, LEVL IV, 45-59 MIN: ICD-10-PCS | Mod: 25,S$GLB,, | Performed by: COLON & RECTAL SURGERY

## 2023-10-18 PROCEDURE — 1160F RVW MEDS BY RX/DR IN RCRD: CPT | Mod: CPTII,S$GLB,, | Performed by: COLON & RECTAL SURGERY

## 2023-10-18 PROCEDURE — 3008F PR BODY MASS INDEX (BMI) DOCUMENTED: ICD-10-PCS | Mod: CPTII,S$GLB,, | Performed by: COLON & RECTAL SURGERY

## 2023-10-18 PROCEDURE — 99204 OFFICE O/P NEW MOD 45 MIN: CPT | Mod: 25,S$GLB,, | Performed by: COLON & RECTAL SURGERY

## 2023-10-18 PROCEDURE — 1159F PR MEDICATION LIST DOCUMENTED IN MEDICAL RECORD: ICD-10-PCS | Mod: CPTII,S$GLB,, | Performed by: COLON & RECTAL SURGERY

## 2023-10-18 PROCEDURE — 3008F BODY MASS INDEX DOCD: CPT | Mod: CPTII,S$GLB,, | Performed by: COLON & RECTAL SURGERY

## 2023-10-18 PROCEDURE — 1160F PR REVIEW ALL MEDS BY PRESCRIBER/CLIN PHARMACIST DOCUMENTED: ICD-10-PCS | Mod: CPTII,S$GLB,, | Performed by: COLON & RECTAL SURGERY

## 2023-10-18 PROCEDURE — 3078F PR MOST RECENT DIASTOLIC BLOOD PRESSURE < 80 MM HG: ICD-10-PCS | Mod: CPTII,S$GLB,, | Performed by: COLON & RECTAL SURGERY

## 2023-10-18 PROCEDURE — 3077F SYST BP >= 140 MM HG: CPT | Mod: CPTII,S$GLB,, | Performed by: COLON & RECTAL SURGERY

## 2023-10-18 PROCEDURE — 46221 LIGATION OF HEMORRHOID(S): CPT | Mod: S$GLB,,, | Performed by: COLON & RECTAL SURGERY

## 2023-10-18 PROCEDURE — 3077F PR MOST RECENT SYSTOLIC BLOOD PRESSURE >= 140 MM HG: ICD-10-PCS | Mod: CPTII,S$GLB,, | Performed by: COLON & RECTAL SURGERY

## 2023-10-18 PROCEDURE — 4010F ACE/ARB THERAPY RXD/TAKEN: CPT | Mod: CPTII,S$GLB,, | Performed by: COLON & RECTAL SURGERY

## 2023-10-18 PROCEDURE — 3078F DIAST BP <80 MM HG: CPT | Mod: CPTII,S$GLB,, | Performed by: COLON & RECTAL SURGERY

## 2023-10-30 ENCOUNTER — PATIENT MESSAGE (OUTPATIENT)
Dept: PRIMARY CARE CLINIC | Facility: CLINIC | Age: 58
End: 2023-10-30
Payer: COMMERCIAL

## 2023-10-30 NOTE — TELEPHONE ENCOUNTER
LOV 12/22/22  Annual 9/14/23  RTC 11/6/23      Spoke to the patient and scheduled an appointment. The patient states the swelling has been going on for a few weeks now. Denies any pain, redness, warm to touch. Patient has tried elevating his leg and has not seen success.

## 2023-10-30 NOTE — TELEPHONE ENCOUNTER
Spoke with the patient. Dr. Chacon was able to review his message about leg swelling. Dr. Chacon would like the patient to go to an urgent or emergency department if the patient's symptoms get worse. He denies any pain. I offered an appointment tomorrow at 945 and the patient declined because he does not get off of work until 230. The patient was made aware of the concerns of possible blood clot in his leg and if his symptoms got any worse he needed to go to the emergency department immediately. The patient gave a verbal understanding.

## 2023-11-06 ENCOUNTER — HOSPITAL ENCOUNTER (OUTPATIENT)
Dept: RADIOLOGY | Facility: OTHER | Age: 58
Discharge: HOME | End: 2023-11-06
Attending: FAMILY MEDICINE
Payer: COMMERCIAL

## 2023-11-06 ENCOUNTER — OFFICE VISIT (OUTPATIENT)
Dept: PRIMARY CARE CLINIC | Facility: CLINIC | Age: 58
End: 2023-11-06
Payer: COMMERCIAL

## 2023-11-06 VITALS
WEIGHT: 315 LBS | HEIGHT: 71 IN | SYSTOLIC BLOOD PRESSURE: 138 MMHG | BODY MASS INDEX: 44.1 KG/M2 | DIASTOLIC BLOOD PRESSURE: 82 MMHG | OXYGEN SATURATION: 98 % | HEART RATE: 90 BPM

## 2023-11-06 DIAGNOSIS — Z86.718 HISTORY OF DVT (DEEP VEIN THROMBOSIS): ICD-10-CM

## 2023-11-06 DIAGNOSIS — M79.661 RIGHT CALF PAIN: Primary | ICD-10-CM

## 2023-11-06 DIAGNOSIS — I10 PRIMARY HYPERTENSION: ICD-10-CM

## 2023-11-06 PROCEDURE — 3075F PR MOST RECENT SYSTOLIC BLOOD PRESS GE 130-139MM HG: ICD-10-PCS | Mod: CPTII,S$GLB,, | Performed by: FAMILY MEDICINE

## 2023-11-06 PROCEDURE — 3075F SYST BP GE 130 - 139MM HG: CPT | Mod: CPTII,S$GLB,, | Performed by: FAMILY MEDICINE

## 2023-11-06 PROCEDURE — 3044F HG A1C LEVEL LT 7.0%: CPT | Mod: CPTII,S$GLB,, | Performed by: FAMILY MEDICINE

## 2023-11-06 PROCEDURE — 4010F PR ACE/ARB THEARPY RXD/TAKEN: ICD-10-PCS | Mod: CPTII,S$GLB,, | Performed by: FAMILY MEDICINE

## 2023-11-06 PROCEDURE — 3079F DIAST BP 80-89 MM HG: CPT | Mod: CPTII,S$GLB,, | Performed by: FAMILY MEDICINE

## 2023-11-06 PROCEDURE — 4010F ACE/ARB THERAPY RXD/TAKEN: CPT | Mod: CPTII,S$GLB,, | Performed by: FAMILY MEDICINE

## 2023-11-06 PROCEDURE — 1159F PR MEDICATION LIST DOCUMENTED IN MEDICAL RECORD: ICD-10-PCS | Mod: CPTII,S$GLB,, | Performed by: FAMILY MEDICINE

## 2023-11-06 PROCEDURE — 93971 US LOWER EXTREMITY VEINS RIGHT: ICD-10-PCS | Mod: 26,RT,, | Performed by: RADIOLOGY

## 2023-11-06 PROCEDURE — 93971 EXTREMITY STUDY: CPT | Mod: TC,RT

## 2023-11-06 PROCEDURE — 3044F PR MOST RECENT HEMOGLOBIN A1C LEVEL <7.0%: ICD-10-PCS | Mod: CPTII,S$GLB,, | Performed by: FAMILY MEDICINE

## 2023-11-06 PROCEDURE — 3008F PR BODY MASS INDEX (BMI) DOCUMENTED: ICD-10-PCS | Mod: CPTII,S$GLB,, | Performed by: FAMILY MEDICINE

## 2023-11-06 PROCEDURE — 93971 EXTREMITY STUDY: CPT | Mod: 26,RT,, | Performed by: RADIOLOGY

## 2023-11-06 PROCEDURE — 1160F RVW MEDS BY RX/DR IN RCRD: CPT | Mod: CPTII,S$GLB,, | Performed by: FAMILY MEDICINE

## 2023-11-06 PROCEDURE — 3079F PR MOST RECENT DIASTOLIC BLOOD PRESSURE 80-89 MM HG: ICD-10-PCS | Mod: CPTII,S$GLB,, | Performed by: FAMILY MEDICINE

## 2023-11-06 PROCEDURE — 3008F BODY MASS INDEX DOCD: CPT | Mod: CPTII,S$GLB,, | Performed by: FAMILY MEDICINE

## 2023-11-06 PROCEDURE — 99214 OFFICE O/P EST MOD 30 MIN: CPT | Mod: S$GLB,,, | Performed by: FAMILY MEDICINE

## 2023-11-06 PROCEDURE — 1160F PR REVIEW ALL MEDS BY PRESCRIBER/CLIN PHARMACIST DOCUMENTED: ICD-10-PCS | Mod: CPTII,S$GLB,, | Performed by: FAMILY MEDICINE

## 2023-11-06 PROCEDURE — 1159F MED LIST DOCD IN RCRD: CPT | Mod: CPTII,S$GLB,, | Performed by: FAMILY MEDICINE

## 2023-11-06 PROCEDURE — 99999 PR PBB SHADOW E&M-EST. PATIENT-LVL III: ICD-10-PCS | Mod: PBBFAC,,, | Performed by: FAMILY MEDICINE

## 2023-11-06 PROCEDURE — 99999 PR PBB SHADOW E&M-EST. PATIENT-LVL III: CPT | Mod: PBBFAC,,, | Performed by: FAMILY MEDICINE

## 2023-11-06 PROCEDURE — 99214 PR OFFICE/OUTPT VISIT, EST, LEVL IV, 30-39 MIN: ICD-10-PCS | Mod: S$GLB,,, | Performed by: FAMILY MEDICINE

## 2023-11-06 NOTE — PROGRESS NOTES
"    /82 (BP Location: Right arm, Patient Position: Sitting, BP Method: Large (Manual))   Pulse 90   Ht 5' 11" (1.803 m)   Wt (!) 157 kg (346 lb 2 oz)   SpO2 98%   BMI 48.27 kg/m²       ===========    Chief Complaint: No chief complaint on file.          ADI Cr is a 58 y.o. male     here for    RIGHT LE swelling and pain for about two weeks. HO DVT 2018    Previously on HRT--quit taking last week    Some tenderness to calf.  No shortness of breath.  No history of PE      Patient queried and denies any further complaints      Patient Active Problem List   Diagnosis    Abnormal EKG    Morbid obesity due to excess calories    DJD (degenerative joint disease), ankle and foot, right    Erectile dysfunction due to arterial insufficiency    Umbilical hernia without obstruction and without gangrene    Male hypogonadism    Decreased ROM of left knee    Decreased strength involving knee joint    Lactose intolerance    BPH with urinary obstruction    Primary hypertension    BRBPR (bright red blood per rectum)       SURGICAL AND MEDICAL HISTORY: updated and reviewed.  Past Surgical History:   Procedure Laterality Date    ARTHROSCOPY OF KNEE Right     CHONDROPLASTY OF KNEE Left 1/4/2022    Procedure: CHONDROPLASTY, KNEE;  Surgeon: Idalia Mclean MD;  Location: [facility];  Service: Orthopedics;  Laterality: Left;    COLONOSCOPY N/A 2/15/2019    Procedure: COLONOSCOPY;  Surgeon: Boris Carey MD;  Location: [facility];  Service: Endoscopy;  Laterality: N/A;    COLONOSCOPY N/A 3/23/2023    Procedure: COLONOSCOPY;  Surgeon: Tristan Sorto MD;  Location: [facility];  Service: Endoscopy;  Laterality: N/A;    ESOPHAGOGASTRODUODENOSCOPY N/A 2/15/2019    Procedure: ESOPHAGOGASTRODUODENOSCOPY (EGD);  Surgeon: Boris Carey MD;  Location: [facility] ENDO;  Service: Endoscopy;  Laterality: N/A;    KNEE ARTHROSCOPY W/ MENISCECTOMY Left 1/4/2022    Procedure: ARTHROSCOPY, KNEE, WITH MEDIAL MENISCECTOMY;  Surgeon: Idalia Mclean, " MD;  Location: [facility] OR;  Service: Orthopedics;  Laterality: Left;    KNEE ARTHROSCOPY W/ PLICA EXCISION Left 1/4/2022    Procedure: EXCISION, PLICA, KNEE, ARTHROSCOPIC;  Surgeon: Idalia Mclean MD;  Location: [facility] OR;  Service: Orthopedics;  Laterality: Left;    KNEE DEBRIDEMENT  1/4/2022    Procedure: DEBRIDEMENT, KNEE;  Surgeon: Idalia Mclean MD;  Location: [facility] OR;  Service: Orthopedics;;    KNEE SURGERY Right     SYNOVECTOMY OF KNEE Left 1/4/2022    Procedure: PARTIAL SYNOVECTOMY, KNEE;  Surgeon: Idalia Mclean MD;  Location: [facility] OR;  Service: Orthopedics;  Laterality: Left;     ALLERGIES updated and reviewed.  Review of patient's allergies indicates:   Allergen Reactions    Cucumber fruit extract Hives and Itching       CURRENT OUTPATIENT MEDICATIONS updated and reviewed    Current Outpatient Medications:     candesartan (ATACAND) 8 MG tablet, ONE PO DAILY FOR HYPERTENSION, Disp: 90 tablet, Rfl: 3    tadalafiL (CIALIS) 20 MG Tab, Take 1 tablet (20 mg total) by mouth once daily. Take 1 hour before intercourse, Disp: 10 tablet, Rfl: 11    hydroCHLOROthiazide (HYDRODIURIL) 25 MG tablet, Take 1 tablet (25 mg total) by mouth once daily., Disp: 90 tablet, Rfl: 1    Review of Systems   Constitutional:  Negative for activity change, appetite change, chills, diaphoresis, fatigue, fever and unexpected weight change.   HENT:  Negative for congestion, ear discharge, ear pain, facial swelling, hearing loss, nosebleeds, postnasal drip, rhinorrhea, sinus pressure, sneezing, sore throat, tinnitus, trouble swallowing and voice change.    Eyes:  Negative for photophobia, pain, discharge, redness, itching and visual disturbance.   Respiratory:  Negative for cough, chest tightness, shortness of breath and wheezing.    Cardiovascular:  Negative for chest pain, palpitations and leg swelling.   Gastrointestinal:  Negative for abdominal distention, abdominal pain, anal bleeding, blood in stool, constipation, diarrhea, nausea, rectal pain and  "vomiting.   Endocrine: Negative for cold intolerance, heat intolerance, polydipsia, polyphagia and polyuria.   Genitourinary:  Negative for difficulty urinating, dysuria and flank pain.   Musculoskeletal:  Negative for arthralgias, back pain, joint swelling, myalgias and neck pain.   Skin:  Negative for rash.   Neurological:  Negative for dizziness, tremors, seizures, syncope, speech difficulty, weakness, light-headedness, numbness and headaches.   Psychiatric/Behavioral:  Negative for behavioral problems, confusion, decreased concentration, dysphoric mood, sleep disturbance and suicidal ideas. The patient is not nervous/anxious and is not hyperactive.        /82 (BP Location: Right arm, Patient Position: Sitting, BP Method: Large (Manual))   Pulse 90   Ht 5' 11" (1.803 m)   Wt (!) 157 kg (346 lb 2 oz)   SpO2 98%   BMI 48.27 kg/m²   Physical Exam  Vitals and nursing note reviewed.   Constitutional:       General: He is not in acute distress.     Appearance: Normal appearance. He is well-developed. He is not ill-appearing or toxic-appearing.   HENT:      Head: Normocephalic and atraumatic.      Right Ear: Tympanic membrane, ear canal and external ear normal.      Left Ear: Tympanic membrane, ear canal and external ear normal.      Nose: Nose normal.      Mouth/Throat:      Lips: Pink.      Mouth: Mucous membranes are moist.      Pharynx: No oropharyngeal exudate or posterior oropharyngeal erythema.   Eyes:      General: No scleral icterus.        Right eye: No discharge.         Left eye: No discharge.      Extraocular Movements: Extraocular movements intact.      Conjunctiva/sclera: Conjunctivae normal.   Cardiovascular:      Rate and Rhythm: Normal rate and regular rhythm.      Pulses: Normal pulses.      Heart sounds: Normal heart sounds. No murmur heard.  Pulmonary:      Effort: Pulmonary effort is normal. No respiratory distress.      Breath sounds: Normal breath sounds. No wheezing or rales. "   Abdominal:      General: Bowel sounds are normal. There is no distension.      Palpations: Abdomen is soft. There is no mass.      Tenderness: There is no abdominal tenderness. There is no right CVA tenderness, left CVA tenderness, guarding or rebound.      Hernia: No hernia is present.   Musculoskeletal:      Cervical back: Normal range of motion and neck supple. No rigidity or tenderness.   Lymphadenopathy:      Cervical: No cervical adenopathy.   Skin:     General: Skin is warm and dry.   Neurological:      General: No focal deficit present.      Mental Status: He is alert. Mental status is at baseline.   Psychiatric:         Mood and Affect: Mood normal.         Behavior: Behavior normal. Behavior is cooperative.         ASSESSMENT/PLAN  Diagnoses and all orders for this visit:    Right calf pain    History of DVT (deep vein thrombosis)  -     US Lower Extremity Veins Right; Future    Primary hypertension    Other orders  -     hydroCHLOROthiazide (HYDRODIURIL) 25 MG tablet; Take 1 tablet (25 mg total) by mouth once daily.        Evaluate with stat ultrasound for possible DVT.  Consider ER if symptoms worsen or definitely go to ER if any shortness a breath.    Most recent some lab results reviewed, if available.  Any new prescription medications gone over in detail including reason for taking the medication, the general mechanism of action, most common possible side effects and possible costs, etcetera.    Chronic conditions updated. Other than changes or additions as above, cont current medications and maintain follow-up with specialists if indicated.     Solo Chacon MD  A dictation device was used to produce this document. Use of such devices sometimes results in grammatical errors or replacement of words that sound similarly.

## 2023-11-07 RX ORDER — HYDROCHLOROTHIAZIDE 25 MG/1
25 TABLET ORAL DAILY
Qty: 90 TABLET | Refills: 1 | Status: SHIPPED | OUTPATIENT
Start: 2023-11-07 | End: 2023-12-18 | Stop reason: SDUPTHER

## 2023-11-30 ENCOUNTER — TELEPHONE (OUTPATIENT)
Dept: SURGERY | Facility: CLINIC | Age: 58
End: 2023-11-30
Payer: COMMERCIAL

## 2023-11-30 NOTE — PROGRESS NOTES
CRS Office Visit History and Physical    SUBJECTIVE:     Chief Complaint: Rectal bleeding    History of Present Illness:  Patient is a 58 y.o. male presents for banding follow-up.  Better with banding, bleeding has essentially resolved.  Pain for 1-2 days after banding.    (10/18/2023)  Bright red blood with BM.  Symptoms have been present for 2 months. No pain. Happens a few times per month.  On daily fiber gummies  Spends < 5min on toilet at a time.  Has a daily BM.  No straining.  Previous anorectal procedures: No  Blood thinners: No    Last Colonoscopy: [facility] 2023 (Normal)  Family history of colorectal cancer or IBD: None.    Review of patient's allergies indicates:   Allergen Reactions    Cucumber fruit extract Hives and Itching       Past Medical History:   Diagnosis Date    Allergy     Blood clotting tendency     DJD (degenerative joint disease)     Hyperlipidemia     Low back strain, initial encounter 5/16/2019    Morbid obesity with BMI of 40.0-44.9, adult 3/29/2018    Obesity     Primary hypertension 7/10/2023    Urticaria      Past Surgical History:   Procedure Laterality Date    ARTHROSCOPY OF KNEE Right     CHONDROPLASTY OF KNEE Left 1/4/2022    Procedure: CHONDROPLASTY, KNEE;  Surgeon: Idalia Mclean MD;  Location: [facility];  Service: Orthopedics;  Laterality: Left;    COLONOSCOPY N/A 2/15/2019    Procedure: COLONOSCOPY;  Surgeon: Boris Carey MD;  Location: [facility];  Service: Endoscopy;  Laterality: N/A;    COLONOSCOPY N/A 3/23/2023    Procedure: COLONOSCOPY;  Surgeon: Tristan Sorto MD;  Location: [facility];  Service: Endoscopy;  Laterality: N/A;    ESOPHAGOGASTRODUODENOSCOPY N/A 2/15/2019    Procedure: ESOPHAGOGASTRODUODENOSCOPY (EGD);  Surgeon: Boris Carey MD;  Location: [facility];  Service: Endoscopy;  Laterality: N/A;    KNEE ARTHROSCOPY W/ MENISCECTOMY Left 1/4/2022    Procedure: ARTHROSCOPY, KNEE, WITH MEDIAL MENISCECTOMY;  Surgeon: Idalia Mclean MD;  Location: [facility];  Service:  Orthopedics;  Laterality: Left;    KNEE ARTHROSCOPY W/ PLICA EXCISION Left 1/4/2022    Procedure: EXCISION, PLICA, KNEE, ARTHROSCOPIC;  Surgeon: Idalia Mclean MD;  Location: [facility] OR;  Service: Orthopedics;  Laterality: Left;    KNEE DEBRIDEMENT  1/4/2022    Procedure: DEBRIDEMENT, KNEE;  Surgeon: Idalia Mclean MD;  Location: [facility] OR;  Service: Orthopedics;;    KNEE SURGERY Right     SYNOVECTOMY OF KNEE Left 1/4/2022    Procedure: PARTIAL SYNOVECTOMY, KNEE;  Surgeon: Idalia Mclean MD;  Location: [facility] OR;  Service: Orthopedics;  Laterality: Left;     Family History   Problem Relation Age of Onset    Asthma Mother     Heart disease Father     Asthma Brother     Colon cancer Neg Hx     Esophageal cancer Neg Hx     Rectal cancer Neg Hx     Stomach cancer Neg Hx     Ulcerative colitis Neg Hx     Irritable bowel syndrome Neg Hx     Crohn's disease Neg Hx     Celiac disease Neg Hx     Melanoma Neg Hx      Social History     Tobacco Use    Smoking status: Never    Smokeless tobacco: Never   Substance Use Topics    Alcohol use: No     Alcohol/week: 0.0 standard drinks of alcohol    Drug use: No        Review of Systems:  ROS    OBJECTIVE:     Vital Signs (Most Recent)  BP (!) 140/82   Pulse 89   Wt (!) 157.8 kg (347 lb 14.2 oz)   BMI 48.52 kg/m²     Physical Exam:  General: Black or  male in no distress   Neuro: Alert and oriented x 4.  Moves all extremities.     HEENT: No icterus.  Trachea midline  Respiratory: Respirations are even and unlabored  Cardiac: Regular rate  Extremities: Warm dry and intact  Skin: No rashes      ASSESSMENT/PLAN:     57yo M w/ intermittent rectal bleeding, s/p RBL     The patient was instructed to:  Increase water intake to at least 8-10 glasses of water per day.  Take a daily fiber supplement (Konsyl, Benefiber, Metamucil) and increase dietary intake to 20-30 grams/day.  Avoid straining or spending >5min/event with bowel movements.  RTC prn    Libertad Gudino MD  Staff Surgeon,  Colon and Rectal Surgery  [facility]

## 2023-12-01 ENCOUNTER — OFFICE VISIT (OUTPATIENT)
Dept: SURGERY | Facility: CLINIC | Age: 58
End: 2023-12-01
Attending: COLON & RECTAL SURGERY
Payer: COMMERCIAL

## 2023-12-01 VITALS
DIASTOLIC BLOOD PRESSURE: 82 MMHG | BODY MASS INDEX: 48.52 KG/M2 | HEART RATE: 89 BPM | WEIGHT: 315 LBS | SYSTOLIC BLOOD PRESSURE: 140 MMHG

## 2023-12-01 DIAGNOSIS — K64.9 HEMORRHOIDS, UNSPECIFIED HEMORRHOID TYPE: Primary | ICD-10-CM

## 2023-12-01 PROCEDURE — 99999 PR PBB SHADOW E&M-EST. PATIENT-LVL III: ICD-10-PCS | Mod: PBBFAC,,, | Performed by: COLON & RECTAL SURGERY

## 2023-12-01 PROCEDURE — 3077F PR MOST RECENT SYSTOLIC BLOOD PRESSURE >= 140 MM HG: ICD-10-PCS | Mod: CPTII,S$GLB,, | Performed by: COLON & RECTAL SURGERY

## 2023-12-01 PROCEDURE — 3044F PR MOST RECENT HEMOGLOBIN A1C LEVEL <7.0%: ICD-10-PCS | Mod: CPTII,S$GLB,, | Performed by: COLON & RECTAL SURGERY

## 2023-12-01 PROCEDURE — 3008F BODY MASS INDEX DOCD: CPT | Mod: CPTII,S$GLB,, | Performed by: COLON & RECTAL SURGERY

## 2023-12-01 PROCEDURE — 1159F PR MEDICATION LIST DOCUMENTED IN MEDICAL RECORD: ICD-10-PCS | Mod: CPTII,S$GLB,, | Performed by: COLON & RECTAL SURGERY

## 2023-12-01 PROCEDURE — 4010F ACE/ARB THERAPY RXD/TAKEN: CPT | Mod: CPTII,S$GLB,, | Performed by: COLON & RECTAL SURGERY

## 2023-12-01 PROCEDURE — 4010F PR ACE/ARB THEARPY RXD/TAKEN: ICD-10-PCS | Mod: CPTII,S$GLB,, | Performed by: COLON & RECTAL SURGERY

## 2023-12-01 PROCEDURE — 3079F DIAST BP 80-89 MM HG: CPT | Mod: CPTII,S$GLB,, | Performed by: COLON & RECTAL SURGERY

## 2023-12-01 PROCEDURE — 1160F PR REVIEW ALL MEDS BY PRESCRIBER/CLIN PHARMACIST DOCUMENTED: ICD-10-PCS | Mod: CPTII,S$GLB,, | Performed by: COLON & RECTAL SURGERY

## 2023-12-01 PROCEDURE — 99213 PR OFFICE/OUTPT VISIT, EST, LEVL III, 20-29 MIN: ICD-10-PCS | Mod: S$GLB,,, | Performed by: COLON & RECTAL SURGERY

## 2023-12-01 PROCEDURE — 3008F PR BODY MASS INDEX (BMI) DOCUMENTED: ICD-10-PCS | Mod: CPTII,S$GLB,, | Performed by: COLON & RECTAL SURGERY

## 2023-12-01 PROCEDURE — 3079F PR MOST RECENT DIASTOLIC BLOOD PRESSURE 80-89 MM HG: ICD-10-PCS | Mod: CPTII,S$GLB,, | Performed by: COLON & RECTAL SURGERY

## 2023-12-01 PROCEDURE — 99999 PR PBB SHADOW E&M-EST. PATIENT-LVL III: CPT | Mod: PBBFAC,,, | Performed by: COLON & RECTAL SURGERY

## 2023-12-01 PROCEDURE — 1159F MED LIST DOCD IN RCRD: CPT | Mod: CPTII,S$GLB,, | Performed by: COLON & RECTAL SURGERY

## 2023-12-01 PROCEDURE — 99213 OFFICE O/P EST LOW 20 MIN: CPT | Mod: S$GLB,,, | Performed by: COLON & RECTAL SURGERY

## 2023-12-01 PROCEDURE — 3044F HG A1C LEVEL LT 7.0%: CPT | Mod: CPTII,S$GLB,, | Performed by: COLON & RECTAL SURGERY

## 2023-12-01 PROCEDURE — 1160F RVW MEDS BY RX/DR IN RCRD: CPT | Mod: CPTII,S$GLB,, | Performed by: COLON & RECTAL SURGERY

## 2023-12-01 PROCEDURE — 3077F SYST BP >= 140 MM HG: CPT | Mod: CPTII,S$GLB,, | Performed by: COLON & RECTAL SURGERY

## 2023-12-05 ENCOUNTER — OFFICE VISIT (OUTPATIENT)
Dept: PRIMARY CARE CLINIC | Facility: CLINIC | Age: 58
End: 2023-12-05
Payer: COMMERCIAL

## 2023-12-05 VITALS
SYSTOLIC BLOOD PRESSURE: 138 MMHG | WEIGHT: 315 LBS | HEART RATE: 85 BPM | HEIGHT: 71 IN | OXYGEN SATURATION: 99 % | RESPIRATION RATE: 19 BRPM | DIASTOLIC BLOOD PRESSURE: 74 MMHG | BODY MASS INDEX: 44.1 KG/M2

## 2023-12-05 DIAGNOSIS — E66.01 MORBID OBESITY DUE TO EXCESS CALORIES: ICD-10-CM

## 2023-12-05 DIAGNOSIS — R60.0 LOWER EXTREMITY EDEMA: Primary | Chronic | ICD-10-CM

## 2023-12-05 PROCEDURE — 1159F PR MEDICATION LIST DOCUMENTED IN MEDICAL RECORD: ICD-10-PCS | Mod: CPTII,S$GLB,, | Performed by: FAMILY MEDICINE

## 2023-12-05 PROCEDURE — 3078F DIAST BP <80 MM HG: CPT | Mod: CPTII,S$GLB,, | Performed by: FAMILY MEDICINE

## 2023-12-05 PROCEDURE — 3075F SYST BP GE 130 - 139MM HG: CPT | Mod: CPTII,S$GLB,, | Performed by: FAMILY MEDICINE

## 2023-12-05 PROCEDURE — 99999 PR PBB SHADOW E&M-EST. PATIENT-LVL IV: ICD-10-PCS | Mod: PBBFAC,,, | Performed by: FAMILY MEDICINE

## 2023-12-05 PROCEDURE — 99214 OFFICE O/P EST MOD 30 MIN: CPT | Mod: S$GLB,,, | Performed by: FAMILY MEDICINE

## 2023-12-05 PROCEDURE — 4010F ACE/ARB THERAPY RXD/TAKEN: CPT | Mod: CPTII,S$GLB,, | Performed by: FAMILY MEDICINE

## 2023-12-05 PROCEDURE — 1160F RVW MEDS BY RX/DR IN RCRD: CPT | Mod: CPTII,S$GLB,, | Performed by: FAMILY MEDICINE

## 2023-12-05 PROCEDURE — 3044F PR MOST RECENT HEMOGLOBIN A1C LEVEL <7.0%: ICD-10-PCS | Mod: CPTII,S$GLB,, | Performed by: FAMILY MEDICINE

## 2023-12-05 PROCEDURE — 3078F PR MOST RECENT DIASTOLIC BLOOD PRESSURE < 80 MM HG: ICD-10-PCS | Mod: CPTII,S$GLB,, | Performed by: FAMILY MEDICINE

## 2023-12-05 PROCEDURE — 3075F PR MOST RECENT SYSTOLIC BLOOD PRESS GE 130-139MM HG: ICD-10-PCS | Mod: CPTII,S$GLB,, | Performed by: FAMILY MEDICINE

## 2023-12-05 PROCEDURE — 99999 PR PBB SHADOW E&M-EST. PATIENT-LVL IV: CPT | Mod: PBBFAC,,, | Performed by: FAMILY MEDICINE

## 2023-12-05 PROCEDURE — 3008F PR BODY MASS INDEX (BMI) DOCUMENTED: ICD-10-PCS | Mod: CPTII,S$GLB,, | Performed by: FAMILY MEDICINE

## 2023-12-05 PROCEDURE — 3044F HG A1C LEVEL LT 7.0%: CPT | Mod: CPTII,S$GLB,, | Performed by: FAMILY MEDICINE

## 2023-12-05 PROCEDURE — 4010F PR ACE/ARB THEARPY RXD/TAKEN: ICD-10-PCS | Mod: CPTII,S$GLB,, | Performed by: FAMILY MEDICINE

## 2023-12-05 PROCEDURE — 99214 PR OFFICE/OUTPT VISIT, EST, LEVL IV, 30-39 MIN: ICD-10-PCS | Mod: S$GLB,,, | Performed by: FAMILY MEDICINE

## 2023-12-05 PROCEDURE — 1159F MED LIST DOCD IN RCRD: CPT | Mod: CPTII,S$GLB,, | Performed by: FAMILY MEDICINE

## 2023-12-05 PROCEDURE — 3008F BODY MASS INDEX DOCD: CPT | Mod: CPTII,S$GLB,, | Performed by: FAMILY MEDICINE

## 2023-12-05 PROCEDURE — 1160F PR REVIEW ALL MEDS BY PRESCRIBER/CLIN PHARMACIST DOCUMENTED: ICD-10-PCS | Mod: CPTII,S$GLB,, | Performed by: FAMILY MEDICINE

## 2023-12-07 NOTE — PROGRESS NOTES
"    /74 (BP Location: Left arm, Patient Position: Sitting, BP Method: Medium (Manual))   Pulse 85   Resp 19   Ht 5' 11" (1.803 m)   Wt (!) 158 kg (348 lb 5.2 oz)   SpO2 99%   BMI 48.58 kg/m²       ===========    Chief Complaint: Leg Swelling          HPI    Topher Cr is a 58 y.o. male     here for    Longstanding lower extremity edema.  Seems pitting.  Get somewhat better by morning but does not improve.  On candesartan 8 mg, hydrochlorothiazide 25 mg for primary hypertension which is fairly well-controlled--needs some improvement systolic.      BMI is 48.6.      Denies any dyspnea including no paroxysmal nocturnal dyspnea  Patient queried and denies any further complaints      Patient Active Problem List   Diagnosis    Abnormal EKG    Morbid obesity due to excess calories    DJD (degenerative joint disease), ankle and foot, right    Erectile dysfunction due to arterial insufficiency    Umbilical hernia without obstruction and without gangrene    Male hypogonadism    Decreased ROM of left knee    Decreased strength involving knee joint    Lactose intolerance    BPH with urinary obstruction    Primary hypertension    BRBPR (bright red blood per rectum)       SURGICAL AND MEDICAL HISTORY: updated and reviewed.  Past Surgical History:   Procedure Laterality Date    ARTHROSCOPY OF KNEE Right     CHONDROPLASTY OF KNEE Left 1/4/2022    Procedure: CHONDROPLASTY, KNEE;  Surgeon: Idalia Mclean MD;  Location: [facility];  Service: Orthopedics;  Laterality: Left;    COLONOSCOPY N/A 2/15/2019    Procedure: COLONOSCOPY;  Surgeon: Boris Carey MD;  Location: [facility];  Service: Endoscopy;  Laterality: N/A;    COLONOSCOPY N/A 3/23/2023    Procedure: COLONOSCOPY;  Surgeon: Tristan Sorto MD;  Location: [facility];  Service: Endoscopy;  Laterality: N/A;    ESOPHAGOGASTRODUODENOSCOPY N/A 2/15/2019    Procedure: ESOPHAGOGASTRODUODENOSCOPY (EGD);  Surgeon: Boris Carey MD;  Location: [facility];  Service: Endoscopy;  " Laterality: N/A;    KNEE ARTHROSCOPY W/ MENISCECTOMY Left 1/4/2022    Procedure: ARTHROSCOPY, KNEE, WITH MEDIAL MENISCECTOMY;  Surgeon: Idalia Mclean MD;  Location: [facility] OR;  Service: Orthopedics;  Laterality: Left;    KNEE ARTHROSCOPY W/ PLICA EXCISION Left 1/4/2022    Procedure: EXCISION, PLICA, KNEE, ARTHROSCOPIC;  Surgeon: Idalia Mclean MD;  Location: [facility] OR;  Service: Orthopedics;  Laterality: Left;    KNEE DEBRIDEMENT  1/4/2022    Procedure: DEBRIDEMENT, KNEE;  Surgeon: Idalia Mclean MD;  Location: [facility] OR;  Service: Orthopedics;;    KNEE SURGERY Right     SYNOVECTOMY OF KNEE Left 1/4/2022    Procedure: PARTIAL SYNOVECTOMY, KNEE;  Surgeon: Idalia Mclean MD;  Location: [facility] OR;  Service: Orthopedics;  Laterality: Left;     ALLERGIES updated and reviewed.  Review of patient's allergies indicates:   Allergen Reactions    Cucumber fruit extract Hives and Itching       CURRENT OUTPATIENT MEDICATIONS updated and reviewed    Current Outpatient Medications:     candesartan (ATACAND) 8 MG tablet, ONE PO DAILY FOR HYPERTENSION, Disp: 90 tablet, Rfl: 3    hydroCHLOROthiazide (HYDRODIURIL) 25 MG tablet, Take 1 tablet (25 mg total) by mouth once daily., Disp: 90 tablet, Rfl: 1    tadalafiL (CIALIS) 20 MG Tab, Take 1 tablet (20 mg total) by mouth once daily. Take 1 hour before intercourse, Disp: 10 tablet, Rfl: 11    Review of Systems   Constitutional:  Negative for activity change, appetite change, chills, diaphoresis, fatigue, fever and unexpected weight change.   HENT:  Negative for congestion, ear discharge, ear pain, facial swelling, hearing loss, nosebleeds, postnasal drip, rhinorrhea, sinus pressure, sneezing, sore throat, tinnitus, trouble swallowing and voice change.    Eyes:  Negative for photophobia, pain, discharge, redness, itching and visual disturbance.   Respiratory:  Negative for cough, chest tightness, shortness of breath and wheezing.    Cardiovascular:  Negative for chest pain, palpitations and leg swelling.  "  Gastrointestinal:  Negative for abdominal distention, abdominal pain, anal bleeding, blood in stool, constipation, diarrhea, nausea, rectal pain and vomiting.   Endocrine: Negative for cold intolerance, heat intolerance, polydipsia, polyphagia and polyuria.   Genitourinary:  Negative for difficulty urinating, dysuria and flank pain.   Musculoskeletal:  Negative for arthralgias, back pain, joint swelling, myalgias and neck pain.   Skin:  Negative for rash.   Neurological:  Negative for dizziness, tremors, seizures, syncope, speech difficulty, weakness, light-headedness, numbness and headaches.   Psychiatric/Behavioral:  Negative for behavioral problems, confusion, decreased concentration, dysphoric mood, sleep disturbance and suicidal ideas. The patient is not nervous/anxious and is not hyperactive.        /74 (BP Location: Left arm, Patient Position: Sitting, BP Method: Medium (Manual))   Pulse 85   Resp 19   Ht 5' 11" (1.803 m)   Wt (!) 158 kg (348 lb 5.2 oz)   SpO2 99%   BMI 48.58 kg/m²   Physical Exam  Vitals and nursing note reviewed.   Constitutional:       General: He is not in acute distress.     Appearance: Normal appearance. He is well-developed. He is not ill-appearing or toxic-appearing.   HENT:      Head: Normocephalic and atraumatic.      Right Ear: Tympanic membrane, ear canal and external ear normal.      Left Ear: Tympanic membrane, ear canal and external ear normal.      Nose: Nose normal.      Mouth/Throat:      Lips: Pink.      Mouth: Mucous membranes are moist.      Pharynx: No oropharyngeal exudate or posterior oropharyngeal erythema.   Eyes:      General: No scleral icterus.        Right eye: No discharge.         Left eye: No discharge.      Extraocular Movements: Extraocular movements intact.      Conjunctiva/sclera: Conjunctivae normal.   Cardiovascular:      Rate and Rhythm: Normal rate and regular rhythm.      Pulses: Normal pulses.      Heart sounds: Normal heart sounds. No " murmur heard.     Comments: 2+ bilateral nonpitting edema to mid leg  Pulmonary:      Effort: Pulmonary effort is normal. No respiratory distress.      Breath sounds: Normal breath sounds. No wheezing or rales.   Abdominal:      General: Bowel sounds are normal. There is no distension.      Palpations: Abdomen is soft. There is no mass.      Tenderness: There is no abdominal tenderness. There is no right CVA tenderness, left CVA tenderness, guarding or rebound.      Hernia: No hernia is present.   Musculoskeletal:      Cervical back: Normal range of motion and neck supple. No rigidity or tenderness.      Right lower le+ Edema present.      Left lower le+ Edema present.   Lymphadenopathy:      Cervical: No cervical adenopathy.   Skin:     General: Skin is warm and dry.   Neurological:      General: No focal deficit present.      Mental Status: He is alert. Mental status is at baseline.   Psychiatric:         Mood and Affect: Mood normal.         Behavior: Behavior normal. Behavior is cooperative.         ASSESSMENT/PLAN  1. Lower extremity edema  Comments:  Weight loss.  Sodium restriction.  Continue compression socks.  Rule out heart failure with echocardiogram 2D  Orders:  -     Echo; Future    2. Morbid obesity due to excess calories  Assessment & Plan:  Weight loss by calorie restriction and exercise.  Calculate basal metabolic rate.  Monitor.  Consider bariatric medicine referral.    Orders:  -     Ambulatory referral/consult to Bariatric Medicine; Future; Expected date: 2023        2 g sodium diet.    If no heart failure, continue with weight loss and sodium restriction and compression socks.  Educated about etiology of what is likely venous insufficiency but educated that we need to rule out heart failure in a 58-year-old male with a BMI 48.6 and hypertension      Most recent some lab results reviewed, if available.  Any new prescription medications gone over in detail including reason for taking  the medication, the general mechanism of action, most common possible side effects and possible costs, etcetera.    Chronic conditions updated. Other than changes or additions as above, cont current medications and maintain follow-up with specialists if indicated.     Solo Chacon MD  A dictation device was used to produce this document. Use of such devices sometimes results in grammatical errors or replacement of words that sound similarly.

## 2023-12-07 NOTE — ASSESSMENT & PLAN NOTE
Weight loss by calorie restriction and exercise.  Calculate basal metabolic rate.  Monitor.  Consider bariatric medicine referral.

## 2023-12-17 ENCOUNTER — PATIENT MESSAGE (OUTPATIENT)
Dept: PRIMARY CARE CLINIC | Facility: CLINIC | Age: 58
End: 2023-12-17
Payer: COMMERCIAL

## 2023-12-18 ENCOUNTER — PATIENT MESSAGE (OUTPATIENT)
Dept: SURGERY | Facility: CLINIC | Age: 58
End: 2023-12-18
Payer: COMMERCIAL

## 2023-12-18 PROBLEM — K62.5 BRBPR (BRIGHT RED BLOOD PER RECTUM): Status: RESOLVED | Noted: 2023-09-15 | Resolved: 2023-12-18

## 2023-12-18 RX ORDER — HYDROCHLOROTHIAZIDE 25 MG/1
25 TABLET ORAL DAILY
Qty: 90 TABLET | Refills: 1 | Status: SHIPPED | OUTPATIENT
Start: 2023-12-18 | End: 2023-12-18 | Stop reason: SDUPTHER

## 2023-12-18 RX ORDER — HYDROCHLOROTHIAZIDE 25 MG/1
25 TABLET ORAL DAILY
Qty: 90 TABLET | Refills: 3 | Status: SHIPPED | OUTPATIENT
Start: 2023-12-18 | End: 2024-12-17

## 2023-12-18 RX ORDER — CANDESARTAN 8 MG/1
TABLET ORAL
Qty: 90 TABLET | Refills: 3 | Status: SHIPPED | OUTPATIENT
Start: 2023-12-18

## 2023-12-19 ENCOUNTER — HOSPITAL ENCOUNTER (OUTPATIENT)
Dept: CARDIOLOGY | Facility: HOSPITAL | Age: 58
Discharge: HOME | End: 2023-12-19
Attending: FAMILY MEDICINE
Payer: COMMERCIAL

## 2023-12-19 VITALS
HEIGHT: 71 IN | SYSTOLIC BLOOD PRESSURE: 176 MMHG | BODY MASS INDEX: 44.1 KG/M2 | WEIGHT: 315 LBS | DIASTOLIC BLOOD PRESSURE: 89 MMHG | HEART RATE: 92 BPM

## 2023-12-19 DIAGNOSIS — R60.0 LOWER EXTREMITY EDEMA: Chronic | ICD-10-CM

## 2023-12-19 PROCEDURE — 93306 ECHO (CUPID ONLY): ICD-10-PCS | Mod: 26,,, | Performed by: INTERNAL MEDICINE

## 2023-12-19 PROCEDURE — 93306 TTE W/DOPPLER COMPLETE: CPT | Mod: 26,,, | Performed by: INTERNAL MEDICINE

## 2023-12-19 PROCEDURE — 93306 TTE W/DOPPLER COMPLETE: CPT

## 2023-12-20 LAB
ASCENDING AORTA: 3.12 CM
AV INDEX (PROSTH): 0.93
AV MEAN GRADIENT: 6 MMHG
AV PEAK GRADIENT: 10 MMHG
AV VALVE AREA BY VELOCITY RATIO: 3.54 CM²
AV VALVE AREA: 3.72 CM²
AV VELOCITY RATIO: 0.88
BSA FOR ECHO PROCEDURE: 2.81 M2
CV ECHO LV RWT: 0.46 CM
DOP CALC AO PEAK VEL: 1.62 M/S
DOP CALC AO VTI: 31.19 CM
DOP CALC LVOT AREA: 4 CM2
DOP CALC LVOT DIAMETER: 2.26 CM
DOP CALC LVOT PEAK VEL: 1.43 M/S
DOP CALC LVOT STROKE VOLUME: 116.11 CM3
DOP CALCLVOT PEAK VEL VTI: 28.96 CM
E WAVE DECELERATION TIME: 142.24 MSEC
E/A RATIO: 0.88
E/E' RATIO: 6.8 M/S
ECHO LV POSTERIOR WALL: 1.03 CM (ref 0.6–1.1)
FRACTIONAL SHORTENING: 36 % (ref 28–44)
INTERVENTRICULAR SEPTUM: 1.33 CM (ref 0.6–1.1)
LA MAJOR: 5.21 CM
LA MINOR: 5.66 CM
LA WIDTH: 3.8 CM
LEFT ATRIUM SIZE: 3.88 CM
LEFT ATRIUM VOLUME INDEX MOD: 22.5 ML/M2
LEFT ATRIUM VOLUME INDEX: 25.5 ML/M2
LEFT ATRIUM VOLUME MOD: 60 CM3
LEFT ATRIUM VOLUME: 68 CM3
LEFT INTERNAL DIMENSION IN SYSTOLE: 2.86 CM (ref 2.1–4)
LEFT VENTRICLE DIASTOLIC VOLUME INDEX: 34.28 ML/M2
LEFT VENTRICLE DIASTOLIC VOLUME: 91.54 ML
LEFT VENTRICLE MASS INDEX: 72 G/M2
LEFT VENTRICLE SYSTOLIC VOLUME INDEX: 11.6 ML/M2
LEFT VENTRICLE SYSTOLIC VOLUME: 31.04 ML
LEFT VENTRICULAR INTERNAL DIMENSION IN DIASTOLE: 4.48 CM (ref 3.5–6)
LEFT VENTRICULAR MASS: 192.04 G
LV LATERAL E/E' RATIO: 5.67 M/S
LV SEPTAL E/E' RATIO: 8.5 M/S
MV A" WAVE DURATION": 8.56 MSEC
MV PEAK A VEL: 0.77 M/S
MV PEAK E VEL: 0.68 M/S
MV STENOSIS PRESSURE HALF TIME: 41.25 MS
MV VALVE AREA P 1/2 METHOD: 5.33 CM2
PISA TR MAX VEL: 1.47 M/S
PULM VEIN S/D RATIO: 1.35
PV PEAK D VEL: 0.46 M/S
PV PEAK S VEL: 0.62 M/S
RA MAJOR: 5.57 CM
RA WIDTH: 5.08 CM
RIGHT VENTRICULAR END-DIASTOLIC DIMENSION: 4.25 CM
SINUS: 3.54 CM
STJ: 2.71 CM
TDI LATERAL: 0.12 M/S
TDI SEPTAL: 0.08 M/S
TDI: 0.1 M/S
TR MAX PG: 9 MMHG
TRICUSPID ANNULAR PLANE SYSTOLIC EXCURSION: 2.1 CM
Z-SCORE OF LEFT VENTRICULAR DIMENSION IN END DIASTOLE: -16.4
Z-SCORE OF LEFT VENTRICULAR DIMENSION IN END SYSTOLE: -12.23

## 2024-01-19 ENCOUNTER — TELEPHONE (OUTPATIENT)
Dept: SURGERY | Facility: CLINIC | Age: 59
End: 2024-01-19
Payer: COMMERCIAL

## 2024-01-31 ENCOUNTER — OCCUPATIONAL HEALTH (OUTPATIENT)
Dept: URGENT CARE | Facility: CLINIC | Age: 59
End: 2024-01-31

## 2024-01-31 DIAGNOSIS — Z02.83 ENCOUNTER FOR DRUG SCREENING: Primary | ICD-10-CM

## 2024-01-31 PROCEDURE — 80305 DRUG TEST PRSMV DIR OPT OBS: CPT | Mod: S$GLB,,, | Performed by: PHYSICIAN ASSISTANT

## 2024-02-16 ENCOUNTER — PATIENT MESSAGE (OUTPATIENT)
Dept: SURGERY | Facility: CLINIC | Age: 59
End: 2024-02-16
Payer: COMMERCIAL

## 2024-02-27 ENCOUNTER — OCCUPATIONAL HEALTH (OUTPATIENT)
Dept: URGENT CARE | Facility: CLINIC | Age: 59
End: 2024-02-27

## 2024-02-27 DIAGNOSIS — Z02.83 ENCOUNTER FOR DRUG SCREENING: Primary | ICD-10-CM

## 2024-02-27 PROCEDURE — 80305 DRUG TEST PRSMV DIR OPT OBS: CPT | Mod: S$GLB,,, | Performed by: PHYSICIAN ASSISTANT

## 2024-03-20 ENCOUNTER — OCCUPATIONAL HEALTH (OUTPATIENT)
Dept: URGENT CARE | Facility: CLINIC | Age: 59
End: 2024-03-20

## 2024-03-20 DIAGNOSIS — Z02.83 ENCOUNTER FOR DRUG SCREENING: Primary | ICD-10-CM

## 2024-03-20 PROCEDURE — 80305 DRUG TEST PRSMV DIR OPT OBS: CPT | Mod: S$GLB,,, | Performed by: PHYSICIAN ASSISTANT

## 2024-04-22 ENCOUNTER — OFFICE VISIT (OUTPATIENT)
Dept: PRIMARY CARE CLINIC | Facility: CLINIC | Age: 59
End: 2024-04-22
Payer: COMMERCIAL

## 2024-04-22 ENCOUNTER — HOSPITAL ENCOUNTER (OUTPATIENT)
Dept: RADIOLOGY | Facility: HOSPITAL | Age: 59
Discharge: HOME | End: 2024-04-22
Attending: FAMILY MEDICINE
Payer: COMMERCIAL

## 2024-04-22 VITALS
SYSTOLIC BLOOD PRESSURE: 128 MMHG | DIASTOLIC BLOOD PRESSURE: 74 MMHG | BODY MASS INDEX: 44.1 KG/M2 | WEIGHT: 315 LBS | RESPIRATION RATE: 18 BRPM | OXYGEN SATURATION: 98 % | HEART RATE: 64 BPM | HEIGHT: 71 IN

## 2024-04-22 DIAGNOSIS — N52.01 ERECTILE DYSFUNCTION DUE TO ARTERIAL INSUFFICIENCY: ICD-10-CM

## 2024-04-22 DIAGNOSIS — M25.571 ACUTE RIGHT ANKLE PAIN: ICD-10-CM

## 2024-04-22 DIAGNOSIS — I10 PRIMARY HYPERTENSION: ICD-10-CM

## 2024-04-22 DIAGNOSIS — Z00.00 GENERAL MEDICAL EXAM: Primary | ICD-10-CM

## 2024-04-22 DIAGNOSIS — M79.671 RIGHT FOOT PAIN: ICD-10-CM

## 2024-04-22 DIAGNOSIS — E66.01 MORBID OBESITY DUE TO EXCESS CALORIES: ICD-10-CM

## 2024-04-22 PROCEDURE — 73630 X-RAY EXAM OF FOOT: CPT | Mod: TC,PN,RT

## 2024-04-22 PROCEDURE — 1159F MED LIST DOCD IN RCRD: CPT | Mod: CPTII,S$GLB,, | Performed by: FAMILY MEDICINE

## 2024-04-22 PROCEDURE — 73630 X-RAY EXAM OF FOOT: CPT | Mod: 26,RT,, | Performed by: RADIOLOGY

## 2024-04-22 PROCEDURE — 99396 PREV VISIT EST AGE 40-64: CPT | Mod: S$GLB,,, | Performed by: FAMILY MEDICINE

## 2024-04-22 PROCEDURE — 3008F BODY MASS INDEX DOCD: CPT | Mod: CPTII,S$GLB,, | Performed by: FAMILY MEDICINE

## 2024-04-22 PROCEDURE — 3044F HG A1C LEVEL LT 7.0%: CPT | Mod: CPTII,S$GLB,, | Performed by: FAMILY MEDICINE

## 2024-04-22 PROCEDURE — 73600 X-RAY EXAM OF ANKLE: CPT | Mod: 26,RT,, | Performed by: RADIOLOGY

## 2024-04-22 PROCEDURE — 1160F RVW MEDS BY RX/DR IN RCRD: CPT | Mod: CPTII,S$GLB,, | Performed by: FAMILY MEDICINE

## 2024-04-22 PROCEDURE — 4010F ACE/ARB THERAPY RXD/TAKEN: CPT | Mod: CPTII,S$GLB,, | Performed by: FAMILY MEDICINE

## 2024-04-22 PROCEDURE — 3074F SYST BP LT 130 MM HG: CPT | Mod: CPTII,S$GLB,, | Performed by: FAMILY MEDICINE

## 2024-04-22 PROCEDURE — 3078F DIAST BP <80 MM HG: CPT | Mod: CPTII,S$GLB,, | Performed by: FAMILY MEDICINE

## 2024-04-22 PROCEDURE — 99999 PR PBB SHADOW E&M-EST. PATIENT-LVL IV: CPT | Mod: PBBFAC,,, | Performed by: FAMILY MEDICINE

## 2024-04-22 PROCEDURE — 73600 X-RAY EXAM OF ANKLE: CPT | Mod: TC,PN,RT

## 2024-04-22 RX ORDER — CANDESARTAN 8 MG/1
TABLET ORAL
Qty: 90 TABLET | Refills: 3 | Status: SHIPPED | OUTPATIENT
Start: 2024-04-22

## 2024-04-22 RX ORDER — TADALAFIL 20 MG/1
20 TABLET ORAL DAILY
Qty: 10 TABLET | Refills: 11 | Status: SHIPPED | OUTPATIENT
Start: 2024-04-22

## 2024-04-22 RX ORDER — DICLOFENAC SODIUM 75 MG/1
75 TABLET, DELAYED RELEASE ORAL 2 TIMES DAILY
Qty: 20 TABLET | Refills: 0 | Status: SHIPPED | OUTPATIENT
Start: 2024-04-22

## 2024-04-22 RX ORDER — CANDESARTAN 8 MG/1
TABLET ORAL
Qty: 90 TABLET | Refills: 3 | Status: SHIPPED | OUTPATIENT
Start: 2024-04-22 | End: 2024-04-22 | Stop reason: SDUPTHER

## 2024-04-22 RX ORDER — HYDROCHLOROTHIAZIDE 25 MG/1
25 TABLET ORAL DAILY
Qty: 90 TABLET | Refills: 3 | Status: SHIPPED | OUTPATIENT
Start: 2024-04-22 | End: 2025-04-22

## 2024-04-22 RX ORDER — HYDROCHLOROTHIAZIDE 25 MG/1
25 TABLET ORAL DAILY
Qty: 90 TABLET | Refills: 3 | Status: SHIPPED | OUTPATIENT
Start: 2024-04-22 | End: 2024-04-22 | Stop reason: SDUPTHER

## 2024-04-22 NOTE — PROGRESS NOTES
"      /74   Pulse 64   Resp 18   Ht 5' 11" (1.803 m)   Wt (!) 159.1 kg (350 lb 12 oz)   SpO2 98%   BMI 48.92 kg/m²       ===========    Chief Complaint: Annual Exam          HPI    Topher Cr is a 58 y.o. male     here for    Annual exam.    Health maintenance reviewed with patient in detail inc any recent labs and studies and needs for future screening labs.  Age-appropriate vaccines and other age-appropriate screening studies reviewed with patient in detail.  Sleep health reviewed with patient.  Skin health regarding possible skin cancer screening reviewed with patient.  General regularity of bowel movements and urinations reviewed with patient including any possibility of urine leakage.  Vision screening reviewed with patient.    Recently stepped in a hole that his dog had dug in his yd and his right foot and ankle hurt.  No significant limping.  Has had some swelling.      Patient queried and denies any further complaints      Patient Active Problem List   Diagnosis    Abnormal EKG    Morbid obesity due to excess calories    DJD (degenerative joint disease), ankle and foot, right    Erectile dysfunction due to arterial insufficiency    Umbilical hernia without obstruction and without gangrene    Male hypogonadism    Decreased ROM of left knee    Decreased strength involving knee joint    Lactose intolerance    BPH with urinary obstruction    Primary hypertension       SURGICAL AND MEDICAL HISTORY: updated and reviewed.  Past Surgical History:   Procedure Laterality Date    ARTHROSCOPY OF KNEE Right     CHONDROPLASTY OF KNEE Left 1/4/2022    Procedure: CHONDROPLASTY, KNEE;  Surgeon: Idalia Mclean MD;  Location: [facility] OR;  Service: Orthopedics;  Laterality: Left;    COLONOSCOPY N/A 2/15/2019    Procedure: COLONOSCOPY;  Surgeon: Boris Carey MD;  Location: [facility] ENDO;  Service: Endoscopy;  Laterality: N/A;    COLONOSCOPY N/A 3/23/2023    Procedure: COLONOSCOPY;  Surgeon: Tristan Sorto MD;  " Location: [facility] ENDO;  Service: Endoscopy;  Laterality: N/A;    ESOPHAGOGASTRODUODENOSCOPY N/A 2/15/2019    Procedure: ESOPHAGOGASTRODUODENOSCOPY (EGD);  Surgeon: Boris Carey MD;  Location: [facility] ENDO;  Service: Endoscopy;  Laterality: N/A;    KNEE ARTHROSCOPY W/ MENISCECTOMY Left 1/4/2022    Procedure: ARTHROSCOPY, KNEE, WITH MEDIAL MENISCECTOMY;  Surgeon: Idalia Mclean MD;  Location: [facility] OR;  Service: Orthopedics;  Laterality: Left;    KNEE ARTHROSCOPY W/ PLICA EXCISION Left 1/4/2022    Procedure: EXCISION, PLICA, KNEE, ARTHROSCOPIC;  Surgeon: Idalia Mclean MD;  Location: [facility] OR;  Service: Orthopedics;  Laterality: Left;    KNEE DEBRIDEMENT  1/4/2022    Procedure: DEBRIDEMENT, KNEE;  Surgeon: Idalia Mclean MD;  Location: [facility] OR;  Service: Orthopedics;;    KNEE SURGERY Right     SYNOVECTOMY OF KNEE Left 1/4/2022    Procedure: PARTIAL SYNOVECTOMY, KNEE;  Surgeon: Idalia Mclean MD;  Location: [facility] OR;  Service: Orthopedics;  Laterality: Left;     ALLERGIES updated and reviewed.  Review of patient's allergies indicates:   Allergen Reactions    Cucumber fruit extract Hives and Itching       CURRENT OUTPATIENT MEDICATIONS updated and reviewed    Current Outpatient Medications:     candesartan (ATACAND) 8 MG tablet, ONE PO DAILY FOR HYPERTENSION, Disp: 90 tablet, Rfl: 3    diclofenac (VOLTAREN) 75 MG EC tablet, Take 1 tablet (75 mg total) by mouth 2 (two) times daily with food and water for 3-10 days, Disp: 20 tablet, Rfl: 0    hydroCHLOROthiazide (HYDRODIURIL) 25 MG tablet, Take 1 tablet (25 mg total) by mouth once daily., Disp: 90 tablet, Rfl: 3    tadalafiL (CIALIS) 20 MG Tab, Take 1 tablet (20 mg total) by mouth once daily. Take 1 hour before intercourse, Disp: 10 tablet, Rfl: 11    Review of Systems   Constitutional:  Negative for activity change, appetite change, chills, diaphoresis, fatigue, fever and unexpected weight change.   HENT:  Negative for congestion, ear discharge, ear pain, facial swelling, hearing loss,  "nosebleeds, postnasal drip, rhinorrhea, sinus pressure, sneezing, sore throat, tinnitus, trouble swallowing and voice change.    Eyes:  Negative for photophobia, pain, discharge, redness, itching and visual disturbance.   Respiratory:  Negative for cough, chest tightness, shortness of breath and wheezing.    Cardiovascular:  Negative for chest pain, palpitations and leg swelling.   Gastrointestinal:  Negative for abdominal distention, abdominal pain, anal bleeding, blood in stool, constipation, diarrhea, nausea, rectal pain and vomiting.   Endocrine: Negative for cold intolerance, heat intolerance, polydipsia, polyphagia and polyuria.   Genitourinary:  Negative for difficulty urinating, dysuria and flank pain.   Musculoskeletal:  Negative for arthralgias, back pain, joint swelling, myalgias and neck pain.   Skin:  Negative for rash.   Neurological:  Negative for dizziness, tremors, seizures, syncope, speech difficulty, weakness, light-headedness, numbness and headaches.   Psychiatric/Behavioral:  Negative for behavioral problems, confusion, decreased concentration, dysphoric mood, sleep disturbance and suicidal ideas. The patient is not nervous/anxious and is not hyperactive.        /74   Pulse 64   Resp 18   Ht 5' 11" (1.803 m)   Wt (!) 159.1 kg (350 lb 12 oz)   SpO2 98%   BMI 48.92 kg/m²   Physical Exam  Vitals and nursing note reviewed.   Constitutional:       General: He is not in acute distress.     Appearance: Normal appearance. He is well-developed. He is not ill-appearing or toxic-appearing.   HENT:      Head: Normocephalic and atraumatic.      Right Ear: Tympanic membrane, ear canal and external ear normal.      Left Ear: Tympanic membrane, ear canal and external ear normal.      Nose: Nose normal.      Mouth/Throat:      Lips: Pink.      Mouth: Mucous membranes are moist.      Pharynx: No oropharyngeal exudate or posterior oropharyngeal erythema.   Eyes:      General: No scleral icterus.        " Right eye: No discharge.         Left eye: No discharge.      Extraocular Movements: Extraocular movements intact.      Conjunctiva/sclera: Conjunctivae normal.   Cardiovascular:      Rate and Rhythm: Normal rate and regular rhythm.      Pulses: Normal pulses.      Heart sounds: Normal heart sounds. No murmur heard.  Pulmonary:      Effort: Pulmonary effort is normal. No respiratory distress.      Breath sounds: Normal breath sounds. No wheezing or rales.   Abdominal:      General: Bowel sounds are normal. There is no distension.      Palpations: Abdomen is soft. There is no mass.      Tenderness: There is no abdominal tenderness. There is no right CVA tenderness, left CVA tenderness, guarding or rebound.      Hernia: No hernia is present.   Musculoskeletal:      Cervical back: Normal range of motion and neck supple. No rigidity or tenderness.   Lymphadenopathy:      Cervical: No cervical adenopathy.   Skin:     General: Skin is warm and dry.   Neurological:      General: No focal deficit present.      Mental Status: He is alert. Mental status is at baseline.   Psychiatric:         Mood and Affect: Mood normal.         Behavior: Behavior normal. Behavior is cooperative.         ASSESSMENT/PLAN    Topher was seen today for annual exam.    Diagnoses and all orders for this visit:    General medical exam  -     PSA, Screening; Future  -     CBC Without Differential; Future  -     Comprehensive Metabolic Panel; Future  -     Hemoglobin A1C; Future  -     Lipid Panel; Future  -     TSH; Future    Right foot pain  -     X-Ray Foot Complete Right; Future  Rest, ice, anti-inflammatories--see diclofenac below.  X-ray as above.  Acute right ankle pain  -     X-Ray Ankle 2 View Right; Future  See above.  Primary hypertension  Stable.  Monitor.  Continue current management.  Erectile dysfunction due to arterial insufficiency  Stable.  Monitor.  Prn Cialis  Morbid obesity due to excess calories  Weight loss by calorie restriction  and exercise.  Monitor.        Other orders    -     hydroCHLOROthiazide (HYDRODIURIL) 25 MG tablet; Take 1 tablet (25 mg total) by mouth once daily.  -     tadalafiL (CIALIS) 20 MG Tab; Take 1 tablet (20 mg total) by mouth once daily. Take 1 hour before intercourse  -     diclofenac (VOLTAREN) 75 MG EC tablet; Take 1 tablet (75 mg total) by mouth 2 (two) times daily with food and water for 3-10 days            Most recent some lab results reviewed with patient.  Any new prescription medications gone over in detail including reason for taking the medication, most common possible side effects and possible costs, etcetera.    Chronic conditions updated. Other than changes or additions as above, cont current medications and maintain follow-up with specialists if indicated.     Solo Chacon MD  A dictation device was used to produce this document. Use of such devices sometimes results in grammatical errors or replacement of words that sound similarly.

## 2024-04-23 ENCOUNTER — PATIENT MESSAGE (OUTPATIENT)
Dept: SURGERY | Facility: CLINIC | Age: 59
End: 2024-04-23
Payer: COMMERCIAL

## 2024-09-03 ENCOUNTER — PATIENT MESSAGE (OUTPATIENT)
Dept: PRIMARY CARE CLINIC | Facility: CLINIC | Age: 59
End: 2024-09-03
Payer: COMMERCIAL

## 2024-09-04 ENCOUNTER — TELEPHONE (OUTPATIENT)
Dept: PRIMARY CARE CLINIC | Facility: CLINIC | Age: 59
End: 2024-09-04
Payer: COMMERCIAL

## 2024-09-04 ENCOUNTER — OFFICE VISIT (OUTPATIENT)
Dept: PRIMARY CARE CLINIC | Facility: CLINIC | Age: 59
End: 2024-09-04
Payer: COMMERCIAL

## 2024-09-04 VITALS
SYSTOLIC BLOOD PRESSURE: 124 MMHG | OXYGEN SATURATION: 99 % | BODY MASS INDEX: 44.1 KG/M2 | RESPIRATION RATE: 18 BRPM | HEIGHT: 71 IN | WEIGHT: 315 LBS | DIASTOLIC BLOOD PRESSURE: 78 MMHG | HEART RATE: 82 BPM

## 2024-09-04 DIAGNOSIS — R35.0 URINARY FREQUENCY: ICD-10-CM

## 2024-09-04 DIAGNOSIS — N39.0 URINARY TRACT INFECTION WITHOUT HEMATURIA, SITE UNSPECIFIED: Primary | ICD-10-CM

## 2024-09-04 LAB
BACTERIA #/AREA URNS AUTO: ABNORMAL /HPF
BILIRUB SERPL-MCNC: NORMAL MG/DL
BILIRUB UR QL STRIP: NEGATIVE
BLOOD, POC UA: NORMAL
CLARITY UR REFRACT.AUTO: CLEAR
COLOR UR AUTO: YELLOW
GLUCOSE UR QL STRIP: NEGATIVE
GLUCOSE UR QL STRIP: NORMAL
HGB UR QL STRIP: ABNORMAL
KETONES UR QL STRIP: NEGATIVE
KETONES UR QL STRIP: NORMAL
LEUKOCYTE ESTERASE UR QL STRIP: ABNORMAL
LEUKOCYTE ESTERASE URINE, POC: NORMAL
MICROSCOPIC COMMENT: ABNORMAL
NITRITE UR QL STRIP: NEGATIVE
NITRITE, POC UA: NORMAL
PH UR STRIP: 6 [PH] (ref 5–8)
PH, POC UA: 6
PROT UR QL STRIP: NEGATIVE
PROTEIN, POC: NORMAL
RBC #/AREA URNS AUTO: 4 /HPF (ref 0–4)
SP GR UR STRIP: 1.02 (ref 1–1.03)
SPECIFIC GRAVITY, POC UA: 1.03
SQUAMOUS #/AREA URNS AUTO: 0 /HPF
URN SPEC COLLECT METH UR: ABNORMAL
UROBILINOGEN, POC UA: NORMAL
WBC #/AREA URNS AUTO: 15 /HPF (ref 0–5)

## 2024-09-04 PROCEDURE — 99999 PR PBB SHADOW E&M-EST. PATIENT-LVL III: CPT | Mod: PBBFAC,,, | Performed by: FAMILY MEDICINE

## 2024-09-04 PROCEDURE — 81001 URINALYSIS AUTO W/SCOPE: CPT | Performed by: FAMILY MEDICINE

## 2024-09-04 PROCEDURE — 87147 CULTURE TYPE IMMUNOLOGIC: CPT | Performed by: FAMILY MEDICINE

## 2024-09-04 PROCEDURE — 87086 URINE CULTURE/COLONY COUNT: CPT | Performed by: FAMILY MEDICINE

## 2024-09-04 PROCEDURE — 87088 URINE BACTERIA CULTURE: CPT | Performed by: FAMILY MEDICINE

## 2024-09-04 RX ORDER — SULFAMETHOXAZOLE AND TRIMETHOPRIM 800; 160 MG/1; MG/1
1 TABLET ORAL 2 TIMES DAILY
Qty: 14 TABLET | Refills: 0 | Status: SHIPPED | OUTPATIENT
Start: 2024-09-04 | End: 2024-09-06

## 2024-09-04 NOTE — TELEPHONE ENCOUNTER
----- Message from Solo Chacon MD sent at 9/4/2024  6:52 AM CDT -----  Has a visit with me tomorrow afternoon.  On the schedule, he reports pain in right side and possible UTI.  Should not wait until tomorrow.  He should be seen by me or another provider or urgent care as soon as possible.  Thank you.

## 2024-09-06 LAB — BACTERIA UR CULT: ABNORMAL

## 2024-09-06 RX ORDER — AMOXICILLIN AND CLAVULANATE POTASSIUM 875; 125 MG/1; MG/1
1 TABLET, FILM COATED ORAL EVERY 12 HOURS
Qty: 14 TABLET | Refills: 0 | Status: SHIPPED | OUTPATIENT
Start: 2024-09-06 | End: 2024-09-13

## 2024-09-09 NOTE — PROGRESS NOTES
"      /78 (BP Location: Right arm, Patient Position: Sitting, BP Method: Medium (Manual))   Pulse 82   Resp 18   Ht 5' 11" (1.803 m)   Wt (!) 159.3 kg (351 lb 3.1 oz)   SpO2 99%   BMI 48.98 kg/m²       ===========              Topher Cr is a 59 y.o. male     here for    Urinary frequency and some urgency.  No flank pain.  No fevers or chills.  Symptoms going on for few days.      Patient queried and denies any further complaints      Patient Active Problem List   Diagnosis    Abnormal EKG    Morbid obesity due to excess calories    DJD (degenerative joint disease), ankle and foot, right    Erectile dysfunction due to arterial insufficiency    Umbilical hernia without obstruction and without gangrene    Male hypogonadism    Decreased ROM of left knee    Decreased strength involving knee joint    Lactose intolerance    BPH with urinary obstruction    Primary hypertension       SURGICAL AND MEDICAL HISTORY: updated and reviewed.  Past Surgical History:   Procedure Laterality Date    ARTHROSCOPY OF KNEE Right     CHONDROPLASTY OF KNEE Left 1/4/2022    Procedure: CHONDROPLASTY, KNEE;  Surgeon: Idalia Mclean MD;  Location: [facility];  Service: Orthopedics;  Laterality: Left;    COLONOSCOPY N/A 2/15/2019    Procedure: COLONOSCOPY;  Surgeon: Boris Carey MD;  Location: [facility];  Service: Endoscopy;  Laterality: N/A;    COLONOSCOPY N/A 3/23/2023    Procedure: COLONOSCOPY;  Surgeon: Tristan Sorto MD;  Location: [facility];  Service: Endoscopy;  Laterality: N/A;    ESOPHAGOGASTRODUODENOSCOPY N/A 2/15/2019    Procedure: ESOPHAGOGASTRODUODENOSCOPY (EGD);  Surgeon: Boris Carey MD;  Location: [facility];  Service: Endoscopy;  Laterality: N/A;    KNEE ARTHROSCOPY W/ MENISCECTOMY Left 1/4/2022    Procedure: ARTHROSCOPY, KNEE, WITH MEDIAL MENISCECTOMY;  Surgeon: Idalia Mclean MD;  Location: [facility];  Service: Orthopedics;  Laterality: Left;    KNEE ARTHROSCOPY W/ PLICA EXCISION Left 1/4/2022    Procedure: " EXCISION, PLICA, KNEE, ARTHROSCOPIC;  Surgeon: Idalia Mclean MD;  Location: [facility] OR;  Service: Orthopedics;  Laterality: Left;    KNEE DEBRIDEMENT  1/4/2022    Procedure: DEBRIDEMENT, KNEE;  Surgeon: Idalia Mclean MD;  Location: [facility] OR;  Service: Orthopedics;;    KNEE SURGERY Right     SYNOVECTOMY OF KNEE Left 1/4/2022    Procedure: PARTIAL SYNOVECTOMY, KNEE;  Surgeon: Idalia Mclean MD;  Location: [facility] OR;  Service: Orthopedics;  Laterality: Left;     ALLERGIES updated and reviewed.  Review of patient's allergies indicates:   Allergen Reactions    Cucumber fruit extract Hives and Itching       CURRENT OUTPATIENT MEDICATIONS updated and reviewed    Current Outpatient Medications:     candesartan (ATACAND) 8 MG tablet, ONE PO DAILY FOR HYPERTENSION, Disp: 90 tablet, Rfl: 3    hydroCHLOROthiazide (HYDRODIURIL) 25 MG tablet, Take 1 tablet (25 mg total) by mouth once daily., Disp: 90 tablet, Rfl: 3    tadalafiL (CIALIS) 20 MG Tab, Take 1 tablet (20 mg total) by mouth once daily. Take 1 hour before intercourse, Disp: 10 tablet, Rfl: 11    amoxicillin-clavulanate 875-125mg (AUGMENTIN) 875-125 mg per tablet, Take 1 tablet by mouth every 12 (twelve) hours. for 7 days, Disp: 14 tablet, Rfl: 0    Review of Systems   Constitutional:  Negative for activity change, appetite change, chills, diaphoresis, fatigue, fever and unexpected weight change.   HENT:  Negative for congestion, ear discharge, ear pain, facial swelling, hearing loss, nosebleeds, postnasal drip, rhinorrhea, sinus pressure, sneezing, sore throat, tinnitus, trouble swallowing and voice change.    Eyes:  Negative for photophobia, pain, discharge, redness, itching and visual disturbance.   Respiratory:  Negative for cough, chest tightness, shortness of breath and wheezing.    Cardiovascular:  Negative for chest pain, palpitations and leg swelling.   Gastrointestinal:  Negative for abdominal distention, abdominal pain, anal bleeding, blood in stool, constipation, diarrhea,  "nausea, rectal pain and vomiting.   Endocrine: Negative for cold intolerance, heat intolerance, polydipsia, polyphagia and polyuria.   Genitourinary:  Positive for dysuria and urgency. Negative for difficulty urinating and flank pain.   Musculoskeletal:  Negative for arthralgias, back pain, joint swelling, myalgias and neck pain.   Skin:  Negative for rash.   Neurological:  Negative for dizziness, tremors, seizures, syncope, speech difficulty, weakness, light-headedness, numbness and headaches.   Psychiatric/Behavioral:  Negative for behavioral problems, confusion, decreased concentration, dysphoric mood, sleep disturbance and suicidal ideas. The patient is not nervous/anxious and is not hyperactive.        /78 (BP Location: Right arm, Patient Position: Sitting, BP Method: Medium (Manual))   Pulse 82   Resp 18   Ht 5' 11" (1.803 m)   Wt (!) 159.3 kg (351 lb 3.1 oz)   SpO2 99%   BMI 48.98 kg/m²   Physical Exam  Vitals and nursing note reviewed.   Constitutional:       General: He is not in acute distress.     Appearance: Normal appearance. He is not ill-appearing, toxic-appearing or diaphoretic.   HENT:      Head: Normocephalic and atraumatic.   Eyes:      General: No scleral icterus.        Right eye: No discharge.         Left eye: No discharge.      Extraocular Movements: Extraocular movements intact.      Conjunctiva/sclera: Conjunctivae normal.   Abdominal:      Tenderness: There is no right CVA tenderness or left CVA tenderness.   Skin:     General: Skin is warm and dry.   Neurological:      Mental Status: He is alert.   Psychiatric:         Mood and Affect: Mood normal.         Behavior: Behavior normal.         ASSESSMENT/PLAN    Topher was seen today for urinary tract infection and flank pain.    Diagnoses and all orders for this visit:    Urinary tract infection without hematuria, site unspecified    Urinary frequency  -     POCT URINALYSIS  -     Urinalysis, Reflex to Urine Culture Urine, Clean " Catch  sulfamethoxazole-trimethoprim 800-160mg (BACTRIM DS) 800-160 mg Tab; Take 1 tablet by mouth 2 (two) times daily. for 7 days  -     Urinalysis Microscopic  -     Urine culture            Most recent some lab results reviewed with patient.  Any new prescription medications gone over in detail including reason for taking the medication, most common possible side effects and possible costs, etcetera.    Chronic conditions updated. Other than changes or additions as above, cont current medications and maintain follow-up with specialists if indicated.     Solo Chacon MD  A dictation device was used to produce this document. Use of such devices sometimes results in grammatical errors or replacement of words that sound similarly.

## 2024-10-30 NOTE — PROGRESS NOTES
Chief Complaint:   Chief Complaint   Patient presents with    Hypertension    Hyperlipidemia    DVT     MED REFILL    referral to pulmonary     flu vaccine left leahaldot       Kenrick Webster 60 y.o. male who presents today for Medical Management of the below listed issues. He  has a problem list of   Patient Active Problem List   Diagnosis    DVT (deep venous thrombosis)    Benign essential hypertension    Renal insufficiency    Vitamin D deficiency    Hyperlipidemia    Gastroesophageal reflux disease without esophagitis    Moderate persistent asthma without complication    Other chest pain    Mass    Stress at home    Personal history of colonic polyps    Multiple subsegmental pulmonary emboli without acute cor pulmonale   .  Since the last visit, He has been admitted to [facility], [place], operated by [facility] early October 2024 for vertigo, and does have an appointment with a neurologist in early December.  Patient also was seen last week in the [facility] urgent care for knee pain.  Patient also notes some off-and-on cough/wheezing/mild dyspnea with exertion over the last few months.  Patient does have a prior history of asthma, does use a rescue inhaler at times, and has had previous pulmonary emboli.  Patient currently doing well with his Eliquis without issues.  he has been compliant with   Current Outpatient Medications:     amLODIPine (NORVASC) 5 MG tablet, Take 1 tablet by mouth Daily., Disp: 90 tablet, Rfl: 1    apixaban (Eliquis) 5 MG tablet tablet, Take 1 tablet by mouth Every 12 (Twelve) Hours., Disp: 180 tablet, Rfl: 1    atorvastatin (LIPITOR) 10 MG tablet, Take 1 tablet by mouth Daily., Disp: 90 tablet, Rfl: 1    losartan (COZAAR) 50 MG tablet, Take 1 tablet by mouth Daily., Disp: 90 tablet, Rfl: 1    albuterol sulfate  (90 Base) MCG/ACT inhaler, Inhale 2 puffs Every 4 (Four) Hours As Needed for Wheezing or Shortness of Air., Disp: , Rfl:     Azelastine HCl 137 MCG/SPRAY solution, Administer 1 spray into the  RANDOM NON DOT 5 UDS/OBSERVED    KSD     "nostril(s) as directed by provider Daily As Needed (congestion)., Disp: , Rfl:     cholecalciferol (VITAMIN D3) 1000 UNITS tablet, Take 2 tablets by mouth Daily., Disp: , Rfl:     EPINEPHrine (EPIPEN) 0.3 MG/0.3ML solution auto-injector injection, INJECT 0.3 MILLILITER (0.3 MG) BY INTRAMUSCULAR ROUTE ONCE AS NEEDED FOR ANAPHYLAXIS, Disp: , Rfl:     famotidine (PEPCID) 20 MG tablet, Take 1 tablet by mouth Every 12 (Twelve) Hours., Disp: , Rfl:     fluticasone (FLONASE) 50 MCG/ACT nasal spray, Administer 1 spray into the nostril(s) as directed by provider Daily As Needed for Allergies., Disp: , Rfl:     meclizine (ANTIVERT) 25 MG tablet, Take 1 tablet by mouth 3 (Three) Times a Day As Needed for Dizziness., Disp: , Rfl: .  He denies medication side effects.    All of the other chronic condition(s) listed above are stable w/o issues.    /64   Pulse 80   Temp 97.8 °F (36.6 °C) (Oral)   Resp 16   Ht 182.9 cm (72\")   Wt 90.3 kg (199 lb)   SpO2 98%   BMI 26.99 kg/m²     Results for orders placed or performed during the hospital encounter of 10/06/24   ECG 12 Lead Other; dizziness    Collection Time: 10/06/24  6:18 PM   Result Value Ref Range    QT Interval 333 ms    QTC Interval 377 ms   Comprehensive Metabolic Panel    Collection Time: 10/06/24  7:31 PM    Specimen: Blood   Result Value Ref Range    Glucose 73 65 - 99 mg/dL    BUN 10 8 - 23 mg/dL    Creatinine 1.26 0.76 - 1.27 mg/dL    Sodium 142 136 - 145 mmol/L    Potassium 3.3 (L) 3.5 - 5.2 mmol/L    Chloride 107 98 - 107 mmol/L    CO2 28.0 22.0 - 29.0 mmol/L    Calcium 9.1 8.6 - 10.5 mg/dL    Total Protein 6.2 6.0 - 8.5 g/dL    Albumin 3.9 3.5 - 5.2 g/dL    ALT (SGPT) 16 1 - 41 U/L    AST (SGOT) 15 1 - 40 U/L    Alkaline Phosphatase 86 39 - 117 U/L    Total Bilirubin 0.8 0.0 - 1.2 mg/dL    Globulin 2.3 gm/dL    A/G Ratio 1.7 g/dL    BUN/Creatinine Ratio 7.9 7.0 - 25.0    Anion Gap 7.0 5.0 - 15.0 mmol/L    eGFR 64.9 >60.0 mL/min/1.73   Protime-INR    " Collection Time: 10/06/24  7:31 PM    Specimen: Blood   Result Value Ref Range    Protime 13.8 11.7 - 14.2 Seconds    INR 1.04 0.90 - 1.10   aPTT    Collection Time: 10/06/24  7:31 PM    Specimen: Blood   Result Value Ref Range    PTT 27.1 22.7 - 35.4 seconds   CBC Auto Differential    Collection Time: 10/06/24  7:31 PM    Specimen: Blood   Result Value Ref Range    WBC 6.38 3.40 - 10.80 10*3/mm3    RBC 4.89 4.14 - 5.80 10*6/mm3    Hemoglobin 14.4 13.0 - 17.7 g/dL    Hematocrit 44.2 37.5 - 51.0 %    MCV 90.4 79.0 - 97.0 fL    MCH 29.4 26.6 - 33.0 pg    MCHC 32.6 31.5 - 35.7 g/dL    RDW 13.7 12.3 - 15.4 %    RDW-SD 45.7 37.0 - 54.0 fl    MPV 8.9 6.0 - 12.0 fL    Platelets 289 140 - 450 10*3/mm3    Neutrophil % 46.6 42.7 - 76.0 %    Lymphocyte % 33.1 19.6 - 45.3 %    Monocyte % 13.6 (H) 5.0 - 12.0 %    Eosinophil % 5.8 0.3 - 6.2 %    Basophil % 0.6 0.0 - 1.5 %    Immature Grans % 0.3 0.0 - 0.5 %    Neutrophils, Absolute 2.97 1.70 - 7.00 10*3/mm3    Lymphocytes, Absolute 2.11 0.70 - 3.10 10*3/mm3    Monocytes, Absolute 0.87 0.10 - 0.90 10*3/mm3    Eosinophils, Absolute 0.37 0.00 - 0.40 10*3/mm3    Basophils, Absolute 0.04 0.00 - 0.20 10*3/mm3    Immature Grans, Absolute 0.02 0.00 - 0.05 10*3/mm3    nRBC 0.0 0.0 - 0.2 /100 WBC   D-dimer, Quantitative    Collection Time: 10/06/24  7:31 PM    Specimen: Blood   Result Value Ref Range    D-Dimer, Quantitative <0.27 0.00 - 0.61 MCGFEU/mL   High Sensitivity Troponin T    Collection Time: 10/06/24  7:31 PM    Specimen: Blood   Result Value Ref Range    HS Troponin T 8 <22 ng/L   Green Top (Gel)    Collection Time: 10/06/24  7:31 PM   Result Value Ref Range    Extra Tube Hold for add-ons.    Lavender Top    Collection Time: 10/06/24  7:31 PM   Result Value Ref Range    Extra Tube hold for add-on    Gold Top - SST    Collection Time: 10/06/24  7:31 PM   Result Value Ref Range    Extra Tube Hold for add-ons.    Light Blue Top    Collection Time: 10/06/24  7:31 PM   Result Value  Ref Range    Extra Tube Hold for add-ons.    CBC (No Diff)    Collection Time: 10/07/24  4:56 AM    Specimen: Arm, Right; Blood   Result Value Ref Range    WBC 5.69 3.40 - 10.80 10*3/mm3    RBC 4.60 4.14 - 5.80 10*6/mm3    Hemoglobin 13.7 13.0 - 17.7 g/dL    Hematocrit 41.4 37.5 - 51.0 %    MCV 90.0 79.0 - 97.0 fL    MCH 29.8 26.6 - 33.0 pg    MCHC 33.1 31.5 - 35.7 g/dL    RDW 13.9 12.3 - 15.4 %    RDW-SD 45.9 37.0 - 54.0 fl    MPV 8.8 6.0 - 12.0 fL    Platelets 255 140 - 450 10*3/mm3   Basic Metabolic Panel    Collection Time: 10/07/24  4:56 AM    Specimen: Arm, Right; Blood   Result Value Ref Range    Glucose 91 65 - 99 mg/dL    BUN 9 8 - 23 mg/dL    Creatinine 1.26 0.76 - 1.27 mg/dL    Sodium 141 136 - 145 mmol/L    Potassium 3.8 3.5 - 5.2 mmol/L    Chloride 106 98 - 107 mmol/L    CO2 25.9 22.0 - 29.0 mmol/L    Calcium 8.8 8.6 - 10.5 mg/dL    BUN/Creatinine Ratio 7.1 7.0 - 25.0    Anion Gap 9.1 5.0 - 15.0 mmol/L    eGFR 64.9 >60.0 mL/min/1.73   High Sensitivity Troponin T    Collection Time: 10/07/24  4:56 AM    Specimen: Arm, Right; Blood   Result Value Ref Range    HS Troponin T 9 <22 ng/L   Adult Stress Echo W/ Cont or Stress Agent if Necessary Per Protocol    Collection Time: 10/07/24  1:34 PM   Result Value Ref Range    EF(MOD-bp) 60.5 %    LVIDd 3.9 cm    LVIDs 2.6 cm    IVSd 1.24 cm    LVPWd 1.01 cm    FS 32.8 %    IVS/LVPW 1.22 cm    ESV(cubed) 18.4 ml    LV Sys Vol (BSA corrected) 19.8 cm2    EDV(cubed) 60.7 ml    LV Goodwin Vol (BSA corrected) 48.6 cm2    LV mass(C)d 147.0 grams    LVOT area 3.4 cm2    LVOT diam 2.07 cm    EDV(MOD-sp2) 99.0 ml    EDV(MOD-sp4) 103.0 ml    ESV(MOD-sp2) 37.0 ml    ESV(MOD-sp4) 42.0 ml    SV(MOD-sp2) 62.0 ml    SV(MOD-sp4) 61.0 ml    SVi(MOD-SP2) 29.2 ml/m2    SVi(MOD-SP4) 28.8 ml/m2    SVi (LVOT) 35.2 ml/m2    EF(MOD-sp2) 62.6 %    EF(MOD-sp4) 59.2 %    MV E max olamide 58.2 cm/sec    MV A max olamide 82.3 cm/sec    MV dec time 174 sec    MV E/A 0.71     Pulm A Revs Dur 0.12 sec     MV A dur 0.13 sec    LA ESV Index (BP) 8.4 ml/m2    Med Peak E' Seun 7.9 cm/sec    Lat Peak E' Seun 10.0 cm/sec    TR max seun 233.0 cm/sec    Avg E/e' ratio 6.50     SV(LVOT) 74.7 ml    SV(RVOT) 48.6 ml    Qp/Qs 0.65     RVIDd 2.36 cm    RV Base 2.6 cm    RV Mid 1.96 cm    RV Length 6.7 cm    TAPSE (>1.6) 1.97 cm    RV S' 14.9 cm/sec    LA dimension (2D)  3.0 cm    Pulm Sys Seun 58.8 cm/sec    Pulm Goodwin Seun 40.6 cm/sec    Pulm S/D 1.45     Pulm A Revs Seun 43.8 cm/sec    LV V1 max 119.3 cm/sec    LV V1 max PG 5.7 mmHg    LV V1 mean PG 2.6 mmHg    LV V1 VTI 22.1 cm    Ao pk seun 120.3 cm/sec    Ao max PG 5.8 mmHg    Ao mean PG 3.2 mmHg    Ao V2 VTI 24.1 cm    JOSE(I,D) 3.1 cm2    MV max PG 3.5 mmHg    MV mean PG 1.42 mmHg    MV V2 VTI 35.0 cm    MV P1/2t 83.2 msec    MVA(P1/2t) 2.6 cm2    MVA(VTI) 2.13 cm2    MV dec slope 338.9 cm/sec2    TR max PG 21.7 mmHg    RVSP(TR) 24.7 mmHg    RAP systole 3.0 mmHg    RVOT diam 2.02 cm    RV V1 max PG 2.12 mmHg    RV V1 max 72.8 cm/sec    RV V1 VTI 15.1 cm    PA V2 max 89.8 cm/sec    PA acc time 0.13 sec    PI end-d seun 106.9 cm/sec    Ao root diam 3.5 cm    ACS 2.6 cm    Sinus 3.4 cm    STJ 3.2 cm    Dimensionless Index 0.90 (DI)    Ascending aorta 3.4 cm    Abdo Ao Diam 2.3 cm    PostStressEF 81 %    BH CV STRESS PROTOCOL 1 Valentin     Stage 1 1.0     HR Stage 1 90     BP Stage 1 150/80     Duration Min Stage 1 3     Duration Sec Stage 1 0     Grade Stage 1 10     Speed Stage 1 1.7     BH CV STRESS METS STAGE 1 5.0     Stage 2 2.0     HR Stage 2 101     BP Stage 2 167/79     Duration Min Stage 2 3     Duration Sec Stage 2 0     Grade Stage 2 12     Speed Stage 2 2.5     BH CV STRESS METS STAGE 2 7.5     Stage 3 3.0     HR Stage 3 133     BP Stage 3 164/84     Duration Min Stage 3 3     Duration Sec Stage 3 0     Grade Stage 3 14     Speed Stage 3 3.4     BH CV STRESS METS STAGE 3 10.0     Baseline HR 66 bpm    Baseline /85 mmHg    Peak  bpm    Peak /95 mmHg     Recovery HR 90 bpm    Recovery /69 mmHg    Target HR (85%) 135 bpm    Max. Pred. HR (100%) 159 bpm    Percent Max Pred HR 85.53 %    Exercise duration (min) 9 min    Exercise duration (sec) 0 sec    Estimated workload 10.2 METS    Percent Target  %             The following portions of the patient's history were reviewed and updated as appropriate: allergies, current medications, past family history, past medical history, past social history, past surgical history, and problem list.    Review of Systems   Constitutional:  Negative for activity change, chills and fever.   Respiratory:  Negative for cough.    Cardiovascular:  Negative for chest pain.   Psychiatric/Behavioral:  Negative for dysphoric mood.        Objective             Physical Exam  Vitals and nursing note reviewed.   Constitutional:       General: He is not in acute distress.     Appearance: He is well-developed.   Cardiovascular:      Rate and Rhythm: Normal rate and regular rhythm.   Pulmonary:      Effort: Pulmonary effort is normal.      Breath sounds: Normal breath sounds.   Neurological:      Mental Status: He is alert and oriented to person, place, and time.   Psychiatric:         Behavior: Behavior normal.         Thought Content: Thought content normal.     Hospital records reviewed with pt confirming HPI.  Labs reviewed with pt today during visit. All questions answered.          Diagnoses and all orders for this visit:    1. Benign essential hypertension (Primary)  -     amLODIPine (NORVASC) 5 MG tablet; Take 1 tablet by mouth Daily.  Dispense: 90 tablet; Refill: 1  -     atorvastatin (LIPITOR) 10 MG tablet; Take 1 tablet by mouth Daily.  Dispense: 90 tablet; Refill: 1  -     losartan (COZAAR) 50 MG tablet; Take 1 tablet by mouth Daily.  Dispense: 90 tablet; Refill: 1    2. Mixed hyperlipidemia  -     atorvastatin (LIPITOR) 10 MG tablet; Take 1 tablet by mouth Daily.  Dispense: 90 tablet; Refill: 1    3. Multiple subsegmental  pulmonary emboli without acute cor pulmonale  -     apixaban (Eliquis) 5 MG tablet tablet; Take 1 tablet by mouth Every 12 (Twelve) Hours.  Dispense: 180 tablet; Refill: 1    4. Immunization due  -     Fluzone >6mos (0931-4323)    5. Moderate persistent asthma without complication  -     Ambulatory Referral to Pulmonology

## 2025-03-17 ENCOUNTER — OFFICE VISIT (OUTPATIENT)
Dept: PRIMARY CARE CLINIC | Facility: CLINIC | Age: 60
End: 2025-03-17
Payer: COMMERCIAL

## 2025-03-17 VITALS
OXYGEN SATURATION: 99 % | WEIGHT: 315 LBS | SYSTOLIC BLOOD PRESSURE: 132 MMHG | HEART RATE: 73 BPM | BODY MASS INDEX: 44.1 KG/M2 | DIASTOLIC BLOOD PRESSURE: 74 MMHG | HEIGHT: 71 IN

## 2025-03-17 DIAGNOSIS — E66.01 MORBID OBESITY DUE TO EXCESS CALORIES: ICD-10-CM

## 2025-03-17 DIAGNOSIS — Z00.00 GENERAL MEDICAL EXAM: Primary | ICD-10-CM

## 2025-03-17 DIAGNOSIS — I10 PRIMARY HYPERTENSION: ICD-10-CM

## 2025-03-17 PROCEDURE — 3075F SYST BP GE 130 - 139MM HG: CPT | Mod: CPTII,S$GLB,, | Performed by: FAMILY MEDICINE

## 2025-03-17 PROCEDURE — 3078F DIAST BP <80 MM HG: CPT | Mod: CPTII,S$GLB,, | Performed by: FAMILY MEDICINE

## 2025-03-17 PROCEDURE — 99999 PR PBB SHADOW E&M-EST. PATIENT-LVL III: CPT | Mod: PBBFAC,,, | Performed by: FAMILY MEDICINE

## 2025-03-17 PROCEDURE — 3008F BODY MASS INDEX DOCD: CPT | Mod: CPTII,S$GLB,, | Performed by: FAMILY MEDICINE

## 2025-03-17 PROCEDURE — 99396 PREV VISIT EST AGE 40-64: CPT | Mod: S$GLB,,, | Performed by: FAMILY MEDICINE

## 2025-03-17 PROCEDURE — 4010F ACE/ARB THERAPY RXD/TAKEN: CPT | Mod: CPTII,S$GLB,, | Performed by: FAMILY MEDICINE

## 2025-03-17 PROCEDURE — 1160F RVW MEDS BY RX/DR IN RCRD: CPT | Mod: CPTII,S$GLB,, | Performed by: FAMILY MEDICINE

## 2025-03-17 PROCEDURE — 1159F MED LIST DOCD IN RCRD: CPT | Mod: CPTII,S$GLB,, | Performed by: FAMILY MEDICINE

## 2025-03-17 RX ORDER — HYDROCHLOROTHIAZIDE 25 MG/1
25 TABLET ORAL DAILY
Qty: 90 TABLET | Refills: 3 | Status: SHIPPED | OUTPATIENT
Start: 2025-03-17 | End: 2026-03-17

## 2025-03-17 RX ORDER — CANDESARTAN 8 MG/1
TABLET ORAL
Qty: 90 TABLET | Refills: 3 | Status: SHIPPED | OUTPATIENT
Start: 2025-03-17

## 2025-03-19 ENCOUNTER — PATIENT MESSAGE (OUTPATIENT)
Dept: SURGERY | Facility: CLINIC | Age: 60
End: 2025-03-19
Payer: COMMERCIAL

## 2025-03-20 NOTE — PROGRESS NOTES
"      /74 (BP Location: Left arm, Patient Position: Sitting)   Pulse 73   Ht 5' 11" (1.803 m)   Wt (!) 158.3 kg (348 lb 15.8 oz)   SpO2 99%   BMI 48.67 kg/m²       ===========              Topher Cr is a 59 y.o. male     here for    Annual exam.    Health maintenance reviewed with patient in detail inc any recent labs and studies and needs for future screening labs.  Age-appropriate vaccines and other age-appropriate screening studies reviewed with patient in detail.  Sleep health reviewed with patient.  Skin health regarding possible skin cancer screening reviewed with patient.  General regularity of bowel movements and urinations reviewed with patient including any possibility of urine leakage.  Vision screening reviewed with patient.      Patient queried and denies any further complaints      Problem List[1]    SURGICAL AND MEDICAL HISTORY: updated and reviewed.  Past Surgical History:   Procedure Laterality Date    ARTHROSCOPY OF KNEE Right     CHONDROPLASTY OF KNEE Left 1/4/2022    Procedure: CHONDROPLASTY, KNEE;  Surgeon: Idalia Mclean MD;  Location: [facility];  Service: Orthopedics;  Laterality: Left;    COLONOSCOPY N/A 2/15/2019    Procedure: COLONOSCOPY;  Surgeon: Boris Carey MD;  Location: [facility];  Service: Endoscopy;  Laterality: N/A;    COLONOSCOPY N/A 3/23/2023    Procedure: COLONOSCOPY;  Surgeon: Tristan Sorto MD;  Location: [facility];  Service: Endoscopy;  Laterality: N/A;    ESOPHAGOGASTRODUODENOSCOPY N/A 2/15/2019    Procedure: ESOPHAGOGASTRODUODENOSCOPY (EGD);  Surgeon: Boris Carey MD;  Location: [facility];  Service: Endoscopy;  Laterality: N/A;    KNEE ARTHROSCOPY W/ MENISCECTOMY Left 1/4/2022    Procedure: ARTHROSCOPY, KNEE, WITH MEDIAL MENISCECTOMY;  Surgeon: Idalia Mclean MD;  Location: [facility];  Service: Orthopedics;  Laterality: Left;    KNEE ARTHROSCOPY W/ PLICA EXCISION Left 1/4/2022    Procedure: EXCISION, PLICA, KNEE, ARTHROSCOPIC;  Surgeon: Idalia Mclean MD;  " Location: [facility] OR;  Service: Orthopedics;  Laterality: Left;    KNEE DEBRIDEMENT  1/4/2022    Procedure: DEBRIDEMENT, KNEE;  Surgeon: Idalia Mclean MD;  Location: [facility] OR;  Service: Orthopedics;;    KNEE SURGERY Right     SYNOVECTOMY OF KNEE Left 1/4/2022    Procedure: PARTIAL SYNOVECTOMY, KNEE;  Surgeon: Idalia Mclean MD;  Location: [facility] OR;  Service: Orthopedics;  Laterality: Left;     ALLERGIES updated and reviewed.  Review of patient's allergies indicates:   Allergen Reactions    Cucumber fruit extract Hives and Itching       CURRENT OUTPATIENT MEDICATIONS updated and reviewed  Current Medications[2]    Review of Systems   Constitutional:  Negative for activity change, appetite change, chills, diaphoresis, fatigue, fever and unexpected weight change.   HENT:  Negative for congestion, ear discharge, ear pain, facial swelling, hearing loss, nosebleeds, postnasal drip, rhinorrhea, sinus pressure, sneezing, sore throat, tinnitus, trouble swallowing and voice change.    Eyes:  Negative for photophobia, pain, discharge, redness, itching and visual disturbance.   Respiratory:  Negative for cough, chest tightness, shortness of breath and wheezing.    Cardiovascular:  Negative for chest pain, palpitations and leg swelling.   Gastrointestinal:  Negative for abdominal distention, abdominal pain, anal bleeding, blood in stool, constipation, diarrhea, nausea, rectal pain and vomiting.   Endocrine: Negative for cold intolerance, heat intolerance, polydipsia, polyphagia and polyuria.   Genitourinary:  Negative for difficulty urinating, dysuria and flank pain.   Musculoskeletal:  Negative for arthralgias, back pain, joint swelling, myalgias and neck pain.   Skin:  Negative for rash.   Neurological:  Negative for dizziness, tremors, seizures, syncope, speech difficulty, weakness, light-headedness, numbness and headaches.   Psychiatric/Behavioral:  Negative for behavioral problems, confusion, decreased concentration, dysphoric mood,  "sleep disturbance and suicidal ideas. The patient is not nervous/anxious and is not hyperactive.        /74 (BP Location: Left arm, Patient Position: Sitting)   Pulse 73   Ht 5' 11" (1.803 m)   Wt (!) 158.3 kg (348 lb 15.8 oz)   SpO2 99%   BMI 48.67 kg/m²   Physical Exam  Vitals and nursing note reviewed.   Constitutional:       General: He is not in acute distress.     Appearance: Normal appearance. He is well-developed. He is not ill-appearing or toxic-appearing.   HENT:      Head: Normocephalic and atraumatic.      Right Ear: Tympanic membrane, ear canal and external ear normal.      Left Ear: Tympanic membrane, ear canal and external ear normal.      Nose: Nose normal.      Mouth/Throat:      Lips: Pink.      Mouth: Mucous membranes are moist.      Pharynx: No oropharyngeal exudate or posterior oropharyngeal erythema.   Eyes:      General: No scleral icterus.        Right eye: No discharge.         Left eye: No discharge.      Extraocular Movements: Extraocular movements intact.      Conjunctiva/sclera: Conjunctivae normal.   Cardiovascular:      Rate and Rhythm: Normal rate and regular rhythm.      Pulses: Normal pulses.      Heart sounds: Normal heart sounds. No murmur heard.  Pulmonary:      Effort: Pulmonary effort is normal. No respiratory distress.      Breath sounds: Normal breath sounds. No wheezing or rales.   Abdominal:      General: Bowel sounds are normal. There is no distension.      Palpations: Abdomen is soft. There is no mass.      Tenderness: There is no abdominal tenderness. There is no right CVA tenderness, left CVA tenderness, guarding or rebound.      Hernia: No hernia is present.   Musculoskeletal:      Cervical back: Normal range of motion and neck supple. No rigidity or tenderness.   Lymphadenopathy:      Cervical: No cervical adenopathy.   Skin:     General: Skin is warm and dry.   Neurological:      General: No focal deficit present.      Mental Status: He is alert. Mental " status is at baseline.   Psychiatric:         Mood and Affect: Mood normal.         Behavior: Behavior normal. Behavior is cooperative.         ASSESSMENT/PLAN    Topher was seen today for annual exam.    Diagnoses and all orders for this visit:    General medical exam  -     PSA, Screening; Future  -     CBC Without Differential; Future  -     Comprehensive Metabolic Panel; Future  -     Hemoglobin A1C; Future  -     Lipid Panel; Future  -     TSH; Future    Morbid obesity due to excess calories    Primary hypertension    Other orders  -     hydroCHLOROthiazide (HYDRODIURIL) 25 MG tablet; Take 1 tablet (25 mg total) by mouth once daily.  -     candesartan (ATACAND) 8 MG tablet; ONE PO DAILY FOR HYPERTENSION            Most recent some lab results reviewed with patient.  Any new prescription medications gone over in detail including reason for taking the medication, most common possible side effects and possible costs, etcetera.    Chronic conditions updated. Other than changes or additions as above, cont current medications and maintain follow-up with specialists if indicated.     - Cautioned the patient against excessive use of internet searches for interpreting results before professional review.     Solo Chacon MD  A dictation device was used to produce this document. Use of such devices sometimes results in grammatical errors or replacement of words that sound similarly.                         [1]   Patient Active Problem List  Diagnosis    Abnormal EKG    Morbid obesity due to excess calories    DJD (degenerative joint disease), ankle and foot, right    Erectile dysfunction due to arterial insufficiency    Umbilical hernia without obstruction and without gangrene    Male hypogonadism    Decreased ROM of left knee    Decreased strength involving knee joint    Lactose intolerance    BPH with urinary obstruction    Primary hypertension   [2]   Current Outpatient Medications:     tadalafiL (CIALIS) 20 MG Tab,  Take 1 tablet (20 mg total) by mouth once daily. Take 1 hour before intercourse, Disp: 10 tablet, Rfl: 11    candesartan (ATACAND) 8 MG tablet, ONE PO DAILY FOR HYPERTENSION, Disp: 90 tablet, Rfl: 3    hydroCHLOROthiazide (HYDRODIURIL) 25 MG tablet, Take 1 tablet (25 mg total) by mouth once daily., Disp: 90 tablet, Rfl: 3

## 2025-03-22 ENCOUNTER — LAB VISIT (OUTPATIENT)
Dept: LAB | Facility: HOSPITAL | Age: 60
End: 2025-03-22
Attending: FAMILY MEDICINE
Payer: COMMERCIAL

## 2025-03-22 DIAGNOSIS — Z00.00 GENERAL MEDICAL EXAM: ICD-10-CM

## 2025-03-22 LAB
ALBUMIN SERPL BCP-MCNC: 3.8 G/DL (ref 3.5–5.2)
ALP SERPL-CCNC: 39 UNIT/L (ref 40–150)
ALT SERPL W/O P-5'-P-CCNC: 34 UNIT/L (ref 10–44)
ANION GAP (OHS): 12 MMOL/L (ref 8–16)
AST SERPL-CCNC: 38 UNIT/L (ref 11–45)
BILIRUB SERPL-MCNC: 0.5 MG/DL (ref 0.1–1)
BUN SERPL-MCNC: 12 MG/DL (ref 6–20)
CALCIUM SERPL-MCNC: 9.3 MG/DL (ref 8.7–10.5)
CHLORIDE SERPL-SCNC: 104 MMOL/L (ref 95–110)
CHOLEST SERPL-MCNC: 156 MG/DL (ref 120–199)
CHOLEST/HDLC SERPL: 4.2 {RATIO} (ref 2–5)
CO2 SERPL-SCNC: 24 MMOL/L (ref 23–29)
CREAT SERPL-MCNC: 0.9 MG/DL (ref 0.5–1.4)
EAG (OHS): 123 MG/DL (ref 68–131)
ERYTHROCYTE [DISTWIDTH] IN BLOOD BY AUTOMATED COUNT: 14.8 % (ref 11.5–14.5)
GFR SERPLBLD CREATININE-BSD FMLA CKD-EPI: >60 ML/MIN/1.73/M2
GLUCOSE SERPL-MCNC: 97 MG/DL (ref 70–110)
HBA1C MFR BLD: 5.9 % (ref 4–5.6)
HCT VFR BLD AUTO: 41.1 % (ref 40–54)
HDLC SERPL-MCNC: 37 MG/DL (ref 40–75)
HDLC SERPL: 23.7 % (ref 20–50)
HGB BLD-MCNC: 12.4 GM/DL (ref 14–18)
LDLC SERPL CALC-MCNC: 100.8 MG/DL (ref 63–159)
MCH RBC QN AUTO: 22.8 PG (ref 27–50)
MCHC RBC AUTO-ENTMCNC: 30.2 G/DL (ref 32–36)
MCV RBC AUTO: 76 FL (ref 82–98)
NONHDLC SERPL-MCNC: 119 MG/DL
PLATELET # BLD AUTO: 382 K/UL (ref 150–450)
PMV BLD AUTO: 9.7 FL (ref 9.2–12.9)
POTASSIUM SERPL-SCNC: 3.8 MMOL/L (ref 3.5–5.1)
PROT SERPL-MCNC: 7 GM/DL (ref 6–8.4)
PSA SERPL-MCNC: 2.63 NG/ML
RBC # BLD AUTO: 5.43 M/UL (ref 4.6–6.2)
SODIUM SERPL-SCNC: 140 MMOL/L (ref 136–145)
TRIGL SERPL-MCNC: 91 MG/DL (ref 30–150)
TSH SERPL-ACNC: 1.01 UIU/ML (ref 0.4–4)
WBC # BLD AUTO: 8.03 K/UL (ref 3.9–12.7)

## 2025-03-22 PROCEDURE — 80053 COMPREHEN METABOLIC PANEL: CPT

## 2025-03-22 PROCEDURE — 36415 COLL VENOUS BLD VENIPUNCTURE: CPT

## 2025-03-22 PROCEDURE — 84153 ASSAY OF PSA TOTAL: CPT

## 2025-03-22 PROCEDURE — 83036 HEMOGLOBIN GLYCOSYLATED A1C: CPT

## 2025-03-22 PROCEDURE — 85027 COMPLETE CBC AUTOMATED: CPT

## 2025-03-22 PROCEDURE — 80061 LIPID PANEL: CPT

## 2025-03-22 PROCEDURE — 84443 ASSAY THYROID STIM HORMONE: CPT

## 2025-03-24 ENCOUNTER — RESULTS FOLLOW-UP (OUTPATIENT)
Dept: PRIMARY CARE CLINIC | Facility: CLINIC | Age: 60
End: 2025-03-24

## 2025-03-24 DIAGNOSIS — R97.20 RISING PSA LEVEL: Primary | ICD-10-CM

## 2025-03-25 ENCOUNTER — PATIENT MESSAGE (OUTPATIENT)
Dept: PRIMARY CARE CLINIC | Facility: CLINIC | Age: 60
End: 2025-03-25
Payer: COMMERCIAL

## 2025-03-26 ENCOUNTER — LAB VISIT (OUTPATIENT)
Dept: LAB | Facility: HOSPITAL | Age: 60
End: 2025-03-26
Attending: FAMILY MEDICINE
Payer: COMMERCIAL

## 2025-03-26 DIAGNOSIS — R97.20 RISING PSA LEVEL: ICD-10-CM

## 2025-03-26 PROCEDURE — 36415 COLL VENOUS BLD VENIPUNCTURE: CPT | Mod: PN

## 2025-03-26 PROCEDURE — 84153 ASSAY OF PSA TOTAL: CPT

## 2025-03-27 LAB — PSA SERPL-MCNC: 2.41 NG/ML

## 2025-03-28 ENCOUNTER — RESULTS FOLLOW-UP (OUTPATIENT)
Dept: PRIMARY CARE CLINIC | Facility: CLINIC | Age: 60
End: 2025-03-28

## 2025-03-28 DIAGNOSIS — R97.20 RISING PSA LEVEL: Primary | ICD-10-CM

## 2025-04-08 ENCOUNTER — PATIENT MESSAGE (OUTPATIENT)
Dept: PRIMARY CARE CLINIC | Facility: CLINIC | Age: 60
End: 2025-04-08
Payer: COMMERCIAL

## 2025-04-08 ENCOUNTER — OFFICE VISIT (OUTPATIENT)
Dept: PRIMARY CARE CLINIC | Facility: CLINIC | Age: 60
End: 2025-04-08
Payer: COMMERCIAL

## 2025-04-08 VITALS
HEART RATE: 68 BPM | SYSTOLIC BLOOD PRESSURE: 130 MMHG | WEIGHT: 315 LBS | HEIGHT: 71 IN | BODY MASS INDEX: 44.1 KG/M2 | OXYGEN SATURATION: 100 % | RESPIRATION RATE: 20 BRPM | DIASTOLIC BLOOD PRESSURE: 72 MMHG

## 2025-04-08 DIAGNOSIS — N39.0 URINARY TRACT INFECTION WITHOUT HEMATURIA, SITE UNSPECIFIED: Primary | ICD-10-CM

## 2025-04-08 DIAGNOSIS — N30.00 ACUTE CYSTITIS WITHOUT HEMATURIA: ICD-10-CM

## 2025-04-08 LAB
BILIRUB SERPL-MCNC: NORMAL MG/DL
BLOOD URINE, POC: NORMAL
CLARITY, POC UA: NORMAL
COLOR, POC UA: YELLOW
GLUCOSE UR QL STRIP: NORMAL
KETONES UR QL STRIP: NORMAL
LEUKOCYTE ESTERASE URINE, POC: NORMAL
NITRITE, POC UA: NORMAL
PH, POC UA: 5
PROTEIN, POC: NORMAL
SPECIFIC GRAVITY, POC UA: 1.01
UROBILINOGEN, POC UA: NORMAL

## 2025-04-08 PROCEDURE — 4010F ACE/ARB THERAPY RXD/TAKEN: CPT | Mod: CPTII,S$GLB,, | Performed by: NURSE PRACTITIONER

## 2025-04-08 PROCEDURE — 81002 URINALYSIS NONAUTO W/O SCOPE: CPT | Mod: S$GLB,,, | Performed by: NURSE PRACTITIONER

## 2025-04-08 PROCEDURE — 81003 URINALYSIS AUTO W/O SCOPE: CPT | Performed by: NURSE PRACTITIONER

## 2025-04-08 PROCEDURE — 87088 URINE BACTERIA CULTURE: CPT | Performed by: NURSE PRACTITIONER

## 2025-04-08 PROCEDURE — 3075F SYST BP GE 130 - 139MM HG: CPT | Mod: CPTII,S$GLB,, | Performed by: NURSE PRACTITIONER

## 2025-04-08 PROCEDURE — 99999 PR PBB SHADOW E&M-EST. PATIENT-LVL IV: CPT | Mod: PBBFAC,,, | Performed by: NURSE PRACTITIONER

## 2025-04-08 PROCEDURE — 3044F HG A1C LEVEL LT 7.0%: CPT | Mod: CPTII,S$GLB,, | Performed by: NURSE PRACTITIONER

## 2025-04-08 PROCEDURE — 3008F BODY MASS INDEX DOCD: CPT | Mod: CPTII,S$GLB,, | Performed by: NURSE PRACTITIONER

## 2025-04-08 PROCEDURE — 99214 OFFICE O/P EST MOD 30 MIN: CPT | Mod: S$GLB,,, | Performed by: NURSE PRACTITIONER

## 2025-04-08 PROCEDURE — 3078F DIAST BP <80 MM HG: CPT | Mod: CPTII,S$GLB,, | Performed by: NURSE PRACTITIONER

## 2025-04-08 RX ORDER — NITROFURANTOIN 25; 75 MG/1; MG/1
100 CAPSULE ORAL 2 TIMES DAILY
Qty: 14 CAPSULE | Refills: 0 | Status: SHIPPED | OUTPATIENT
Start: 2025-04-08 | End: 2025-04-15

## 2025-04-08 NOTE — PROGRESS NOTES
[facility] Note    Chief Complaint      Chief Complaint   Patient presents with    Urinary Tract Infection       History of Present Illness      Topher Cr is a 59 y.o. male who presents today for   Chief Complaint   Patient presents with    Urinary Tract Infection         Mr. Cr presents to clinic today for complaints of urinary discomfort with increasing frequency and lower back pain, denying fever, chills, and burning with urination. He has a hx of BPH, urinary tract infections and renal stones. Has follow up with urology tomorrow for elevated PSA.    Urinary Tract Infection   This is a recurrent problem. The current episode started in the past 7 days. The problem occurs intermittently. The problem has been unchanged. The quality of the pain is described as aching. The patient is experiencing no pain. There has been no fever. There is No history of pyelonephritis. Associated symptoms include frequency. Pertinent negatives include no chills, flank pain or hematuria.        Review of Systems   Constitutional:  Negative for chills and fever.   Gastrointestinal:  Negative for abdominal pain.   Genitourinary:  Positive for dysuria and frequency. Negative for flank pain and hematuria.   Musculoskeletal:  Positive for back pain. Negative for myalgias.        Lower back pain        Family History:  family history includes Asthma in his brother and mother; Heart disease in his father.   Family history was reviewed with patient.     Medications:  Encounter Medications[1]    Allergies:  Review of patient's allergies indicates:   Allergen Reactions    Cucumber fruit extract Hives and Itching       Health Maintenance:  Health Maintenance   Topic Date Due    Pneumococcal Vaccines (Age 50+) (1 of 1 - PCV) Never done    RSV Vaccine (Age 60+ and Pregnant patients) (1 - Risk 60-74 years 1-dose series) Never done    TETANUS VACCINE  06/30/2025    Hemoglobin A1c (Prediabetes)  03/22/2026    Lipid Panel   "03/22/2030    Colorectal Cancer Screening  03/23/2033    Hepatitis C Screening  Completed    Shingles Vaccine  Completed    Influenza Vaccine  Completed    HIV Screening  Completed    COVID-19 Vaccine  Completed     Health Maintenance Topics with due status: Not Due       Topic Last Completion Date    TETANUS VACCINE 06/30/2015    Colorectal Cancer Screening 03/23/2023    Hemoglobin A1c (Prediabetes) 03/22/2025    Lipid Panel 03/22/2025       Physical Exam      Vital Signs  Pulse: 68  Resp: 20  SpO2: 100 %  BP: 130/72  BP Location: Right arm  Patient Position: Sitting  Pain Score: 0-No pain  Height and Weight  Height: 5' 11" (180.3 cm)  Weight: (!) 156.8 kg (345 lb 10.9 oz)  BSA (Calculated - sq m): 2.8 sq meters  BMI (Calculated): 48.2  Weight in (lb) to have BMI = 25: 178.9]    Physical Exam  Constitutional:       Appearance: Normal appearance. He is obese.   HENT:      Head: Normocephalic and atraumatic.   Eyes:      Extraocular Movements: Extraocular movements intact.      Pupils: Pupils are equal, round, and reactive to light.   Cardiovascular:      Rate and Rhythm: Normal rate.   Pulmonary:      Effort: Pulmonary effort is normal. No respiratory distress.   Abdominal:      Tenderness: There is no abdominal tenderness. There is no guarding.   Musculoskeletal:      Lumbar back: Tenderness present. No swelling or edema.   Skin:     General: Skin is warm and dry.   Neurological:      General: No focal deficit present.      Mental Status: He is alert and oriented to person, place, and time.   Psychiatric:         Mood and Affect: Mood normal.         Behavior: Behavior normal.         Thought Content: Thought content normal.            Assessment/Plan     Topher Cr is a 59 y.o.male with:    Urinary tract infection without hematuria, site unspecified  Stable.  Advised to increase water intake to flush out bacteria  -     POCT URINE DIPSTICK WITHOUT MICROSCOPE  -     Urinalysis, Reflex to Urine Culture; Future; " Expected date: 04/08/2025    Acute cystitis without hematuria    Other orders  -     nitrofurantoin, macrocrystal-monohydrate, (MACROBID) 100 MG capsule; Take 1 capsule (100 mg total) by mouth 2 (two) times daily. for 7 days  Dispense: 14 capsule; Refill: 0          As above, continue current medications and maintain follow up with specialists.  Return to clinic as needed.    Greater than 50% of visit was spent face to face with patient.  All questions were answered to patient's satisfaction.          Laurie C Ray, NP-C [facility]                     [1]   Outpatient Encounter Medications as of 4/8/2025   Medication Sig Dispense Refill    candesartan (ATACAND) 8 MG tablet ONE PO DAILY FOR HYPERTENSION 90 tablet 3    hydroCHLOROthiazide (HYDRODIURIL) 25 MG tablet Take 1 tablet (25 mg total) by mouth once daily. 90 tablet 3    tadalafiL (CIALIS) 20 MG Tab Take 1 tablet (20 mg total) by mouth once daily. Take 1 hour before intercourse 10 tablet 11    nitrofurantoin, macrocrystal-monohydrate, (MACROBID) 100 MG capsule Take 1 capsule (100 mg total) by mouth 2 (two) times daily. for 7 days 14 capsule 0     No facility-administered encounter medications on file as of 4/8/2025.

## 2025-04-09 ENCOUNTER — OFFICE VISIT (OUTPATIENT)
Dept: UROLOGY | Facility: CLINIC | Age: 60
End: 2025-04-09
Payer: COMMERCIAL

## 2025-04-09 VITALS
BODY MASS INDEX: 44.1 KG/M2 | SYSTOLIC BLOOD PRESSURE: 136 MMHG | WEIGHT: 315 LBS | HEART RATE: 95 BPM | DIASTOLIC BLOOD PRESSURE: 77 MMHG | HEIGHT: 71 IN

## 2025-04-09 DIAGNOSIS — N13.8 BPH WITH URINARY OBSTRUCTION: ICD-10-CM

## 2025-04-09 DIAGNOSIS — I10 PRIMARY HYPERTENSION: ICD-10-CM

## 2025-04-09 DIAGNOSIS — N40.1 BPH WITH URINARY OBSTRUCTION: ICD-10-CM

## 2025-04-09 DIAGNOSIS — R97.20 RISING PSA LEVEL: Primary | ICD-10-CM

## 2025-04-09 DIAGNOSIS — N52.01 ERECTILE DYSFUNCTION DUE TO ARTERIAL INSUFFICIENCY: ICD-10-CM

## 2025-04-09 LAB
BILIRUB UR QL STRIP.AUTO: NEGATIVE
CLARITY UR: CLEAR
COLOR UR AUTO: YELLOW
GLUCOSE UR QL STRIP: NEGATIVE
HGB UR QL STRIP: ABNORMAL
KETONES UR QL STRIP: NEGATIVE
LEUKOCYTE ESTERASE UR QL STRIP: NEGATIVE
NITRITE UR QL STRIP: NEGATIVE
PH UR STRIP: 6 [PH]
PROT UR QL STRIP: NEGATIVE
SP GR UR STRIP: 1.02
UROBILINOGEN UR STRIP-ACNC: NEGATIVE EU/DL

## 2025-04-09 PROCEDURE — 99213 OFFICE O/P EST LOW 20 MIN: CPT | Mod: S$GLB,,, | Performed by: UROLOGY

## 2025-04-09 PROCEDURE — 3044F HG A1C LEVEL LT 7.0%: CPT | Mod: CPTII,S$GLB,, | Performed by: UROLOGY

## 2025-04-09 PROCEDURE — G2211 COMPLEX E/M VISIT ADD ON: HCPCS | Mod: S$GLB,,, | Performed by: UROLOGY

## 2025-04-09 PROCEDURE — 3008F BODY MASS INDEX DOCD: CPT | Mod: CPTII,S$GLB,, | Performed by: UROLOGY

## 2025-04-09 PROCEDURE — 1159F MED LIST DOCD IN RCRD: CPT | Mod: CPTII,S$GLB,, | Performed by: UROLOGY

## 2025-04-09 PROCEDURE — 1160F RVW MEDS BY RX/DR IN RCRD: CPT | Mod: CPTII,S$GLB,, | Performed by: UROLOGY

## 2025-04-09 PROCEDURE — 4010F ACE/ARB THERAPY RXD/TAKEN: CPT | Mod: CPTII,S$GLB,, | Performed by: UROLOGY

## 2025-04-09 PROCEDURE — 3078F DIAST BP <80 MM HG: CPT | Mod: CPTII,S$GLB,, | Performed by: UROLOGY

## 2025-04-09 PROCEDURE — 3075F SYST BP GE 130 - 139MM HG: CPT | Mod: CPTII,S$GLB,, | Performed by: UROLOGY

## 2025-04-09 PROCEDURE — 99999 PR PBB SHADOW E&M-EST. PATIENT-LVL IV: CPT | Mod: PBBFAC,,, | Performed by: UROLOGY

## 2025-04-09 NOTE — LETTER
April 9, 2025        Solo Chacon MD  [address]  [facility] [address]             [facility] 4th Fl  [address]  [facility] [address]  Phone: [phone]   Patient: Topher Cr   MR Number: [id number]   YOB: 1965   Date of Visit: 4/9/2025       Dear Dr. Chacon:    Thank you for referring Topher Cr to me for evaluation. Attached you will find relevant portions of my assessment and plan of care.    If you have questions, please do not hesitate to call me. I look forward to following Topher Cr along with you.    Sincerely,      Kyle Wylie MD            CC  No Recipients    Enclosure

## 2025-04-09 NOTE — PROGRESS NOTES
CHIEF COMPLAINT:    Mr. Cr is a 59 y.o. male presenting with ED.    PRESENTING ILLNESS:    Topher Cr is a 59 y.o. male who c/o severe ED.    He's tried and failed Viagra and Cialis.    He has hypogonadism.  Is on androgel 1.62% using 4 pumps.  While on TRT, he didn't notice a benefit, so he stopped.    He has LUTS.  + decreased FOS.  Is pleased with how he voids.    He presents today because his PSA increased by 1 over the course of a year.    REVIEW OF SYSTEMS:    Topher Cr denies headache, blurred vision, fever, nausea, vomiting, chills, abdominal pain, chest pain, sore throat, bleeding per rectum, cough, SOB, recent loss of consciousness, recent mental status changes, seizures, dizziness, or upper or lower extremity weakness.    MENDOZA  1. 1  2. 2  3. 2  4. 2  5. 1     PATIENT HISTORY:    Past Medical History:   Diagnosis Date    Allergy     Blood clotting tendency     DJD (degenerative joint disease)     Hyperlipidemia     Low back strain, initial encounter 5/16/2019    Morbid obesity with BMI of 40.0-44.9, adult 3/29/2018    Obesity     Primary hypertension 7/10/2023    Urticaria        Past Surgical History:   Procedure Laterality Date    ARTHROSCOPY OF KNEE Right     CHONDROPLASTY OF KNEE Left 1/4/2022    Procedure: CHONDROPLASTY, KNEE;  Surgeon: Idalia Mclean MD;  Location: [facility];  Service: Orthopedics;  Laterality: Left;    COLONOSCOPY N/A 2/15/2019    Procedure: COLONOSCOPY;  Surgeon: Boris Carey MD;  Location: [facility] ENDO;  Service: Endoscopy;  Laterality: N/A;    COLONOSCOPY N/A 3/23/2023    Procedure: COLONOSCOPY;  Surgeon: Tristan Sorto MD;  Location: [facility];  Service: Endoscopy;  Laterality: N/A;    ESOPHAGOGASTRODUODENOSCOPY N/A 2/15/2019    Procedure: ESOPHAGOGASTRODUODENOSCOPY (EGD);  Surgeon: Boris Carey MD;  Location: [facility];  Service: Endoscopy;  Laterality: N/A;    KNEE ARTHROSCOPY W/ MENISCECTOMY Left 1/4/2022    Procedure: ARTHROSCOPY, KNEE, WITH MEDIAL MENISCECTOMY;   Surgeon: Idalia Mclean MD;  Location: [facility] OR;  Service: Orthopedics;  Laterality: Left;    KNEE ARTHROSCOPY W/ PLICA EXCISION Left 1/4/2022    Procedure: EXCISION, PLICA, KNEE, ARTHROSCOPIC;  Surgeon: Idalia Mclean MD;  Location: [facility] OR;  Service: Orthopedics;  Laterality: Left;    KNEE DEBRIDEMENT  1/4/2022    Procedure: DEBRIDEMENT, KNEE;  Surgeon: Idalia Mclean MD;  Location: [facility] OR;  Service: Orthopedics;;    KNEE SURGERY Right     SYNOVECTOMY OF KNEE Left 1/4/2022    Procedure: PARTIAL SYNOVECTOMY, KNEE;  Surgeon: Idalia Mclean MD;  Location: [facility] OR;  Service: Orthopedics;  Laterality: Left;       Family History   Problem Relation Name Age of Onset    Asthma Mother      Heart disease Father      Asthma Brother Jonathan     Colon cancer Neg Hx      Esophageal cancer Neg Hx      Rectal cancer Neg Hx      Stomach cancer Neg Hx      Ulcerative colitis Neg Hx      Irritable bowel syndrome Neg Hx      Crohn's disease Neg Hx      Celiac disease Neg Hx      Melanoma Neg Hx         Social History     Socioeconomic History    Marital status:    Tobacco Use    Smoking status: Never    Smokeless tobacco: Never   Substance and Sexual Activity    Alcohol use: No     Alcohol/week: 0.0 standard drinks of alcohol    Drug use: No   Social History Narrative    Works in Law Enforcement, nonsmoker     Social Drivers of Health     Financial Resource Strain: Patient Declined (11/5/2023)    Overall Financial Resource Strain (CARDIA)     Difficulty of Paying Living Expenses: Patient declined   Food Insecurity: Patient Declined (11/5/2023)    Hunger Vital Sign     Worried About Running Out of Food in the Last Year: Patient declined     Ran Out of Food in the Last Year: Patient declined   Transportation Needs: No Transportation Needs (11/5/2023)    PRAPARE - Transportation     Lack of Transportation (Medical): No     Lack of Transportation (Non-Medical): No   Physical Activity: Unknown (11/5/2023)    Exercise Vital Sign     Days of  Exercise per Week: 0 days   Stress: No Stress Concern Present (7/31/2020)    [employer] - Occupational Stress Questionnaire     Feeling of Stress : Not at all   Housing Stability: Unknown (11/5/2023)    Housing Stability Vital Sign     Unable to Pay for Housing in the Last Year: Patient refused     Number of Places Lived in the Last Year: 1     Unstable Housing in the Last Year: No       Allergies:  Cucumber fruit extract    Medications:    Current Outpatient Medications:     candesartan (ATACAND) 8 MG tablet, ONE PO DAILY FOR HYPERTENSION, Disp: 90 tablet, Rfl: 3    hydroCHLOROthiazide (HYDRODIURIL) 25 MG tablet, Take 1 tablet (25 mg total) by mouth once daily., Disp: 90 tablet, Rfl: 3    nitrofurantoin, macrocrystal-monohydrate, (MACROBID) 100 MG capsule, Take 1 capsule (100 mg total) by mouth 2 (two) times daily. for 7 days, Disp: 14 capsule, Rfl: 0    pneumoc 20-sussy conj-dip cr,PF, (PREVNAR-20, PF,) 0.5 mL Syrg injection, Inject 0.5 mLs into the muscle once. for 1 dose, Disp: 0.5 mL, Rfl: 0    RSVPreF3 antigen-AS01E, PF, (AREXVY, PF,) 120 mcg/0.5 mL SusR vaccine, Inject into the muscle., Disp: 1 each, Rfl: 0    tadalafiL (CIALIS) 20 MG Tab, Take 1 tablet (20 mg total) by mouth once daily. Take 1 hour before intercourse, Disp: 10 tablet, Rfl: 11    PHYSICAL EXAMINATION:    The patient generally appears in good health, is appropriately interactive, and is in no apparent distress.     Eyes: anicteric sclerae, moist conjunctivae; no lid-lag; PERRLA     HENT: Atraumatic; oropharynx clear with moist mucous membranes and no mucosal ulcerations;normal hard and soft palate.  No evidence of lymphadenopathy.    Neck: Trachea midline.  No thyromegaly.    Skin: No lesions.    Mental: Cooperative with normal affect.  Is oriented to time, place, and person.    Neuro: Grossly intact.    Chest: Normal inspiratory effort.   No accessory muscles.  No audible wheezes.  Respirations symmetric on  inspiration and expiration.    Heart: Regular rhythm.      Abdomen:  Soft, non-tender. No masses or organomegaly. Bladder is not palpable. No evidence of flank discomfort. No evidence of inguinal hernia.    Genitourinary: The penis is not circumcised with no evidence of plaques or induration. The urethral meatus is normal. The testes, epididymides, and cord structures are normal in size and contour bilaterally. The scrotum is normal in size and contour.    Normal anal sphincter tone. No rectal mass.    The prostate is smooth. Normal landmarks. Lateral sulci. Median furrow intact.  No nodularity or induration. Seminal vesicles are normal.     Extremities: No clubbing, cyanosis, or edema      LABS:      Lab Results   Component Value Date    PSA 2.41 03/26/2025    PSA 2.63 03/22/2025    PSA 1.4 04/22/2024    PSADIAG 1.6 10/07/2023       IMPRESSION:    Encounter Diagnoses   Name Primary?    Rising PSA level Yes    BPH with urinary obstruction     Erectile dysfunction due to arterial insufficiency     Primary hypertension    HTN, stable      PLAN:    1. Will observe his LUTS as they don't bother him.  2. Discussed options for his severe ED (affected by above comorbidities).   Will observe it.  3. RTC 6 months with a PSA to calculate a PSA velocity with an REJI.   4. Visit today included increased complexity associated with the care of the episodic problem of possible prostate malignancy addressed and managing the longitudinal care of the patient due to the serious and/or complex managed problem(s) requiring a specialist.     Copy to:

## 2025-04-10 LAB — BACTERIA UR CULT: ABNORMAL

## 2025-04-20 NOTE — PROGRESS NOTES
"Subjective:      Patient ID: Topher Cr is a 53 y.o. male.    Chief Complaint: GI Problem (stomach discomfort)    Here today for upper mid stomach abdominal pain, worse with taking a deep breath and leaning over.  Symptoms for the past 4 days.  Symptoms awakens him from sleep.  He is belching more.  He denies any diarrhea or constipation.  He has not tried any medication for this.  He does not take daily anti inflammatory.  Denies any nausea.  He is not sure if he certain foods that could affect this    Current Outpatient Medications on File Prior to Visit   Medication Sig    apixaban 5 mg Tab Take 10 mg (2 tablets) twice a day for 7 days then 5 mg (1 tablet) twice a day.    levocetirizine (XYZAL) 5 MG tablet Take 1 tablet (5 mg total) by mouth every evening.     No current facility-administered medications on file prior to visit.      Past Medical History:   Diagnosis Date    DJD (degenerative joint disease)     Hyperlipidemia     Morbid obesity with BMI of 40.0-44.9, adult 3/29/2018    Obesity      Past Surgical History:   Procedure Laterality Date    COLONOSCOPY N/A 8/6/2015    Performed by Ariel Baez MD at [facility] (4TH FLR)    KNEE SURGERY Right      Social History     Social History Narrative    Works in Law Enforcement, nonsmoker     Family History   Problem Relation Age of Onset    Asthma Mother     Heart disease Father      Vitals:    10/25/18 1511   BP: 121/70   Resp: 20   Temp: 97.7 °F (36.5 °C)   Weight: (!) 144.7 kg (319 lb 0.1 oz)   Height: 5' 11" (1.803 m)   PainSc:   3     Objective:   Physical Exam   Cardiovascular: Normal rate and regular rhythm.   Pulmonary/Chest: Effort normal and breath sounds normal.   Abdominal: Soft. Bowel sounds are normal. He exhibits no distension and no mass. There is tenderness. There is no rebound and no guarding. A hernia is present.   Mild tenderness mid epigastric region, no skin changes, he does have reducible soft nontender small umbilical " INFECTIOUS DISEASE CONSULTATION    Corina Lopez  73 year old female  MRN : [id number]    Date : 2025     Attending physician : Gebreyesus, Esayas T, MD    Reason for consult : Staph bacteremia    History of present illness :   This is a 73-year-old female with multiple medical issues as listed below.  She is known to our service from recent hospitalization late last year.  She is currently admitted with right shoulder pain.  Recent MRI showed concern for septic arthritis/osteomyelitis.  She was admitted to the hospital and started on broad-spectrum antimicrobials.  Blood cultures are positive for methicillin sensitive Staphylococcus aureus.  Also of note, she has profound anemia with hemoglobin of 6.1.  Orthopedic surgery is following and IR arthrocentesis has been ordered.      Past medical History :   Past Medical History:   Diagnosis Date    Anxiety disorder     Cirrhosis  (CMD)     COPD (chronic obstructive pulmonary disease)  (CMD)     Depression     Femur fracture, right (CMD)     S/P ORIF     Hypertension     Paroxysmal atrial fibrillation  (CMD)      History reviewed. No pertinent surgical history.    Allergies :  ALLERGIES:   Allergen Reactions    Ibuprofen Other (See Comments)     Pt doesn't remember         Current medications : Reviewed    Family history :   History reviewed. No pertinent family history.    Social history :   Social History     Tobacco Use    Smoking status: Former     Current packs/day: 0.00     Types: Cigarettes     Quit date: 2020     Years since quittin.3    Smokeless tobacco: Never   Substance Use Topics    Alcohol use: Not Currently       Review of systems :   GENERAL: Denies any fever, chills, sweats, weakness  HEENT: Denies problems with vision, swallowing or hearing  RESPIRATORY: Denies cough, shortness of breath  CARDIAC: Denies chest pain, palpitations, orthopnea  GASTROINTESTINAL: Denies abdominal pain; nausea, vomiting, diarrhea  GENITOURINARY: Denies dysuria,  frequency, urgency, hematuria  MUSCULOSKELETAL: Denies pain, swelling, redness of extremities  SKIN: Denies rash, denies pruritus  NEUROLOGIC: Denies headaches, seizures, syncope  PSYCHIATRIC: Denies feelings of anxiety or depression.   HEMATOLOGIC: Denies bleeding or bruising    Vitals :   Vitals:    04/20/25 1037   BP:    Pulse:    Resp: 18   Temp:        Physical Examination :  General : Awake. No acute distress.   HEENT : Head is normocephalic, atraumatic. MIGUE. Oral mucosa is moist. No scleral icterus  Neck : Supple, No JVD, No LAD. No thyromegaly  Lungs : Clear to auscultation bilaterally. No wheezes, crackles, or rhonchi. No usage of accessory muscles of respiration  Heart : Regular rate and rhythm. No murmurs, gallops, or rubs. No edema in bilateral lower extremities  Abdomen : Soft, positive bowel sounds, Non tender, Non distended. No hepatosplenomegaly  Musculoskeletal : Right shoulder is warm, tender to touch, very limited ROM  Skin : No lesions, rashes, or ulcers. No erythema or cyanosis.  Neurological : Deferred  Psychiatric : Alert and oriented X 3. Normal affect    Laboratory data :    Recent Labs   Lab 04/20/25  0341 04/19/25  2052 04/19/25  1524   WBC 8.2 8.7 7.1   HCT 22.8* 25.7* 20.9*   HGB 7.0* 7.8* 6.1*    304 306   INR  --   --  1.2   PTT  --   --  34*   SODIUM 129*  --  127*   POTASSIUM 4.1  --  3.7   CHLORIDE 99  --  96*   CO2 24  --  26   CALCIUM 9.2  --  9.4   GLUCOSE 108*  --  91   BUN 17  --  17   CREATININE 0.73  --  0.69   AST 25  --  23   GPT 13  --  12   ALKPT 97  --  102   BILIRUBIN 1.1*  --  0.8   ALBUMIN 1.8*  --  1.9*     I have reviewed the results of each test that was performed including cultures, imaging, labs (including those that I did not order), and independently interpreted the results      Imaging :   MRI right shoulder 4/14 :   1.  Markedly abnormal exam with diffusely abnormal bone marrow edema and T1  marrow replacement throughout the proximal humerus and  hernia     Assessment:     1. Epigastric abdominal pain    2. Morbid obesity with BMI of 40.0-44.9, adult      Plan:     Orders Placed This Encounter    omeprazole (PRILOSEC) 20 MG capsule       Patient Instructions   Try to decrease the  triggers of heartburn which include - alcohol,   Tobacco, caffeine, spicy foods, fatty foods, eating large meals before lying down.     Also taking an antiinflammatory ( like Aspirin, Advil (ibuprofen), Alleve (naproxen), Mobic, Aspirin 325mg )  daily can worsen Heartburn or Reflux (GERD)    Call  & let me know  if symptoms worsen or persist after taking prescription medication - OMEPRAZOLE (Prilosec) for one - four weeks    If you are not better, we may consider referral to gastroenterology for further testing or  EGD                                    glenoid with  extensive arthritic change and bony remodeling as discussed. Given the T1  marrow replacement, septic arthropathy with osteomyelitis should be  considered. Other inflammatory or crystalline arthropathy is also in the  differential. Fluid aspiration and analysis is recommended.  2.  Large complex glenohumeral effusion and large complex  subacromial-subdeltoid bursal fluid collections. These do appear to  communicate. Again findings could reflect an infectious process such as  septic arthropathy and bursitis versus other inflammatory or erosive  arthropathy.  3.  Abnormal areas of cortical remodeling and concavity involving  predominantly the humeral head as well as marked remodeling of the glenoid.     4.  Attenuation tearing of the supraspinatus and anterior aspect of  infraspinatus.  5.  Suspect tearing of the superior aspect of the subscapularis tendon.  6.  Diffuse muscle edema which may be reactive in nature or reflect  myositis.    Microbiology :   Blood 4/19 : MSSA, 2 of 2    Antimicrobials :   Cefazolin 4/20 - current    Isolation : None    Assessment / Diagnoses :  MSSA bacteremia  - likely source is right shoulder infection    2.   Right shoulder septic arthritis  - orthopedics following, arthrocentesis ordered  - will need washout    3.   Profound anemia   - Hb 6.1 on admission    4.   Recent right distal femur fracture   - s/p right femur removal of the hardware, distal femur replacement, fixation of the right femur (11/04/2024)   - postoperatively patient developed closed peritrochanteric fracture of the right proximal femur    - s/p right total hip arthroplasty with removal of the hardware (11/21/2024)   - post op course complicated by surgical site infection with wound dehiscence   - s/p I&D of the right hip (12/3/2024)    - received 6 week course of vancomycin and ceftriaxone based on cultures    5.   Chronic medical conditions :    - alcoholic liver cirrhosis   - paroxysmal atrial  fibrillation, on anticoagulation   - HFpEF    Plan / Recommendations :  Consolidate antibiotics to Cefazolin monotherapy  Repeat blood cultures tomorrow  Will need GERA, ordered  Orthopedics input noted.  IR to perform right shoulder arthrocentesis in AM  Remaining issues noted      Thank you for this consultation and for involving us in the care of this patient.  Will continue to follow       Radha Reyez MD  [place] [facility]  Pager : [phone]  Answering service : [phone]    Communication preferences :  6 AM - 9 PM : Epic chat or pager  9 PM - 6 AM : Call answering service to connect

## 2025-04-30 ENCOUNTER — TELEPHONE (OUTPATIENT)
Dept: SURGERY | Facility: CLINIC | Age: 60
End: 2025-04-30
Payer: COMMERCIAL

## 2025-04-30 NOTE — TELEPHONE ENCOUNTER
Called pt to confirm appt with GABI Alegre NP on 5/1/25 at 3:00 PM in [facility] ([facility], 4th floor). Pt expressed understanding of appt's date, time and location. Pt stated he will be there.

## 2025-05-01 ENCOUNTER — OFFICE VISIT (OUTPATIENT)
Dept: SURGERY | Facility: CLINIC | Age: 60
End: 2025-05-01
Attending: COLON & RECTAL SURGERY
Payer: COMMERCIAL

## 2025-05-01 ENCOUNTER — TELEPHONE (OUTPATIENT)
Dept: SURGERY | Facility: CLINIC | Age: 60
End: 2025-05-01
Payer: COMMERCIAL

## 2025-05-01 VITALS
DIASTOLIC BLOOD PRESSURE: 77 MMHG | BODY MASS INDEX: 44.1 KG/M2 | RESPIRATION RATE: 18 BRPM | HEIGHT: 71 IN | WEIGHT: 315 LBS | HEART RATE: 79 BPM | SYSTOLIC BLOOD PRESSURE: 136 MMHG

## 2025-05-01 DIAGNOSIS — K62.89 ANAL IRRITATION: ICD-10-CM

## 2025-05-01 DIAGNOSIS — K62.5 BRBPR (BRIGHT RED BLOOD PER RECTUM): Primary | ICD-10-CM

## 2025-05-01 PROCEDURE — 99999 PR PBB SHADOW E&M-EST. PATIENT-LVL IV: CPT | Mod: PBBFAC,,, | Performed by: NURSE PRACTITIONER

## 2025-05-01 RX ORDER — HYDROCORTISONE 25 MG/G
CREAM TOPICAL 2 TIMES DAILY
Qty: 28 G | Refills: 1 | Status: SHIPPED | OUTPATIENT
Start: 2025-05-01

## 2025-05-01 NOTE — PATIENT INSTRUCTIONS
Get back on psyllium tablets, you may actually need up to 6 tablets in a day  64 oz of water per day  Keep area clean and dry  Avoid ANY kind of wet wipes.  Calmoseptine cream constantly to anus area  Anusol cream IN anus twice a day for 10 days.

## 2025-05-01 NOTE — PROGRESS NOTES
CRS Office Visit History and Physical    Referring Md:   No referring provider defined for this encounter.    SUBJECTIVE:     Chief Complaint: rectal bleeding    History of Present Illness:  The patient is a new patient to this practice.   Course is as follows:  Patient is a 59 y.o. male presents with rectal bleeding.  Symptoms have been present for off and on for 2 years.  Has 1-2 Bms per day.sometimes formed, sometimes looser  Sits on toilet ~ 5 mins, no strain. .  Bleeding with wiping about 5 mins after bm.   No wet wipe use  Uses prep H wipes    Last Colonoscopy: 2023, 10 year recall.  Family history of colorectal cancer or IBD: no    Review of patient's allergies indicates:   Allergen Reactions    Cucumber fruit extract Hives and Itching       Past Medical History:   Diagnosis Date    Allergy     Blood clotting tendency     DJD (degenerative joint disease)     Hyperlipidemia     Low back strain, initial encounter 5/16/2019    Morbid obesity with BMI of 40.0-44.9, adult 3/29/2018    Obesity     Primary hypertension 7/10/2023    Urticaria      Past Surgical History:   Procedure Laterality Date    ARTHROSCOPY OF KNEE Right     CHONDROPLASTY OF KNEE Left 1/4/2022    Procedure: CHONDROPLASTY, KNEE;  Surgeon: Idalia Mclean MD;  Location: [facility];  Service: Orthopedics;  Laterality: Left;    COLONOSCOPY N/A 2/15/2019    Procedure: COLONOSCOPY;  Surgeon: Boris Carey MD;  Location: [facility];  Service: Endoscopy;  Laterality: N/A;    COLONOSCOPY N/A 3/23/2023    Procedure: COLONOSCOPY;  Surgeon: Tristan Sorto MD;  Location: [facility];  Service: Endoscopy;  Laterality: N/A;    ESOPHAGOGASTRODUODENOSCOPY N/A 2/15/2019    Procedure: ESOPHAGOGASTRODUODENOSCOPY (EGD);  Surgeon: Boris Carey MD;  Location: [facility];  Service: Endoscopy;  Laterality: N/A;    KNEE ARTHROSCOPY W/ MENISCECTOMY Left 1/4/2022    Procedure: ARTHROSCOPY, KNEE, WITH MEDIAL MENISCECTOMY;  Surgeon: Idalia Mclean MD;  Location: [facility];   "Service: Orthopedics;  Laterality: Left;    KNEE ARTHROSCOPY W/ PLICA EXCISION Left 1/4/2022    Procedure: EXCISION, PLICA, KNEE, ARTHROSCOPIC;  Surgeon: Idalia Mclean MD;  Location: [facility] OR;  Service: Orthopedics;  Laterality: Left;    KNEE DEBRIDEMENT  1/4/2022    Procedure: DEBRIDEMENT, KNEE;  Surgeon: Idalia Mclean MD;  Location: [facility] OR;  Service: Orthopedics;;    KNEE SURGERY Right     SYNOVECTOMY OF KNEE Left 1/4/2022    Procedure: PARTIAL SYNOVECTOMY, KNEE;  Surgeon: Idalia Mclean MD;  Location: [facility] OR;  Service: Orthopedics;  Laterality: Left;     Family History   Problem Relation Name Age of Onset    Asthma Mother      Heart disease Father      Asthma Brother Jonathan     Colon cancer Neg Hx      Esophageal cancer Neg Hx      Rectal cancer Neg Hx      Stomach cancer Neg Hx      Ulcerative colitis Neg Hx      Irritable bowel syndrome Neg Hx      Crohn's disease Neg Hx      Celiac disease Neg Hx      Melanoma Neg Hx       Social History[1]     Review of Systems:  ROS    OBJECTIVE:     Vital Signs (Most Recent)  /77 (BP Location: Left arm, Patient Position: Sitting)   Pulse 79   Resp 18   Ht 5' 11" (1.803 m)   Wt (!) 155.6 kg (343 lb 0.6 oz)   BMI 47.84 kg/m²     Physical Exam:  General: Black or  male in no distress   Neuro: Alert and oriented to person, place, and time.  Moves all extremities.     HEENT: No icterus.  Trachea midline  Respiratory: Respirations are even and unlabored, no cough or audible wheezing  Skin: Warm dry and intact, No visible rashes, no jaundice    Labs reviewed today:  Lab Results   Component Value Date    WBC 8.03 03/22/2025    HGB 12.4 (L) 03/22/2025    HCT 41.1 03/22/2025     03/22/2025    CHOL 156 03/22/2025    TRIG 91 03/22/2025    HDL 37 (L) 03/22/2025    ALT 34 03/22/2025    AST 38 03/22/2025     03/22/2025    K 3.8 03/22/2025     03/22/2025    CREATININE 0.9 03/22/2025    BUN 12 03/22/2025    CO2 24 03/22/2025    TSH 1.008 03/22/2025    " PSA 2.41 03/26/2025    INR 1.0 12/27/2021    HGBA1C 5.9 (H) 03/22/2025       Anorectal Exam:    Anal Skin:  mild perianal rash with minimal excoriation    Digital Rectal Exam:  Resting Tone normal  Mass no  Tenderness  absent    Anoscopy:  Verbal consent was obtained.   Clear plastic anoscope inserted.    Hemorrhoids  present  Stigmata of bleeding  absent  Stigmata of prolapsed  absent  Distal rectal mucosa normal        ASSESSMENT/PLAN:     Diagnoses and all orders for this visit:    BRBPR (bright red blood per rectum)  -     hydrocortisone (ANUSOL-HC) 2.5 % rectal cream; Place rectally 2 (two) times daily.    Anal irritation  -     hydrocortisone (ANUSOL-HC) 2.5 % rectal cream; Place rectally 2 (two) times daily.      60 yo M here with anal irritation. His bleeding is occurring after a BM and its a scant amount on TP. Im wondering if his perianal area is just getting irritated w a Bm then bleeds. He does and a mild perianal rash.     The patient was instructed to:  Get back on psyllium tablets, you may actually need up to 6 tablets in a day  64 oz of water per day  Keep area clean and dry  Avoid ANY kind of wet wipes.  Calmoseptine cream constantly to anus area  Anusol cream IN anus twice a day for 10 days.    F/u in 1 month    SELINA Hackett  Colon and Rectal Surgery           [1]   Social History  Tobacco Use    Smoking status: Never    Smokeless tobacco: Never   Substance Use Topics    Alcohol use: No     Alcohol/week: 0.0 standard drinks of alcohol    Drug use: No

## 2025-05-01 NOTE — TELEPHONE ENCOUNTER
Called pt to offer earlier appt. Pt answered and stated he would not be able to make it due to his work schedule.

## 2025-05-11 NOTE — PROGRESS NOTES
Two patient identifiers verified.  Allergies reviewed. Shingrix #2  IM administered to right deltoid per MD order.  Patient tolerated injection well; no redness, bleeding, or bruising noted to injection site.  Patient instructed to remain in clinic setting for 15 minutes.  Verbalizes understanding.  
Walk in

## 2025-05-16 ENCOUNTER — PATIENT MESSAGE (OUTPATIENT)
Dept: PRIMARY CARE CLINIC | Facility: CLINIC | Age: 60
End: 2025-05-16
Payer: COMMERCIAL

## 2025-06-05 ENCOUNTER — TELEPHONE (OUTPATIENT)
Dept: SURGERY | Facility: CLINIC | Age: 60
End: 2025-06-05
Payer: COMMERCIAL

## 2025-06-18 ENCOUNTER — PATIENT MESSAGE (OUTPATIENT)
Dept: PRIMARY CARE CLINIC | Facility: CLINIC | Age: 60
End: 2025-06-18
Payer: COMMERCIAL

## 2025-08-13 ENCOUNTER — PATIENT MESSAGE (OUTPATIENT)
Dept: PRIMARY CARE CLINIC | Facility: CLINIC | Age: 60
End: 2025-08-13
Payer: COMMERCIAL

## 2025-08-14 ENCOUNTER — OFFICE VISIT (OUTPATIENT)
Dept: PRIMARY CARE CLINIC | Facility: CLINIC | Age: 60
End: 2025-08-14
Payer: COMMERCIAL

## 2025-08-14 VITALS
HEART RATE: 97 BPM | DIASTOLIC BLOOD PRESSURE: 76 MMHG | BODY MASS INDEX: 44.1 KG/M2 | TEMPERATURE: 99 F | SYSTOLIC BLOOD PRESSURE: 138 MMHG | HEIGHT: 71 IN | OXYGEN SATURATION: 98 % | WEIGHT: 315 LBS

## 2025-08-14 DIAGNOSIS — M51.9 ANNULAR DISC TEAR: Primary | ICD-10-CM

## 2025-08-14 DIAGNOSIS — G89.29 CHRONIC BILATERAL LOW BACK PAIN WITHOUT SCIATICA: ICD-10-CM

## 2025-08-14 DIAGNOSIS — M54.50 CHRONIC BILATERAL LOW BACK PAIN WITHOUT SCIATICA: ICD-10-CM

## 2025-08-14 PROCEDURE — 99214 OFFICE O/P EST MOD 30 MIN: CPT | Mod: S$GLB,,, | Performed by: FAMILY MEDICINE

## 2025-08-14 PROCEDURE — 3044F HG A1C LEVEL LT 7.0%: CPT | Mod: CPTII,S$GLB,, | Performed by: FAMILY MEDICINE

## 2025-08-14 PROCEDURE — 1159F MED LIST DOCD IN RCRD: CPT | Mod: CPTII,S$GLB,, | Performed by: FAMILY MEDICINE

## 2025-08-14 PROCEDURE — 1160F RVW MEDS BY RX/DR IN RCRD: CPT | Mod: CPTII,S$GLB,, | Performed by: FAMILY MEDICINE

## 2025-08-14 PROCEDURE — 3008F BODY MASS INDEX DOCD: CPT | Mod: CPTII,S$GLB,, | Performed by: FAMILY MEDICINE

## 2025-08-14 PROCEDURE — 3078F DIAST BP <80 MM HG: CPT | Mod: CPTII,S$GLB,, | Performed by: FAMILY MEDICINE

## 2025-08-14 PROCEDURE — 3075F SYST BP GE 130 - 139MM HG: CPT | Mod: CPTII,S$GLB,, | Performed by: FAMILY MEDICINE

## 2025-08-14 PROCEDURE — 99999 PR PBB SHADOW E&M-EST. PATIENT-LVL V: CPT | Mod: PBBFAC,,, | Performed by: FAMILY MEDICINE

## 2025-08-14 PROCEDURE — 4010F ACE/ARB THERAPY RXD/TAKEN: CPT | Mod: CPTII,S$GLB,, | Performed by: FAMILY MEDICINE

## 2025-08-14 RX ORDER — METHOCARBAMOL 500 MG/1
500 TABLET, FILM COATED ORAL 4 TIMES DAILY
Qty: 40 TABLET | Refills: 0 | Status: SHIPPED | OUTPATIENT
Start: 2025-08-14

## 2025-08-14 RX ORDER — DICLOFENAC SODIUM 75 MG/1
75 TABLET, DELAYED RELEASE ORAL 2 TIMES DAILY
Qty: 20 TABLET | Refills: 0 | Status: SHIPPED | OUTPATIENT
Start: 2025-08-14

## 2025-08-27 ENCOUNTER — TELEPHONE (OUTPATIENT)
Dept: BARIATRICS | Facility: CLINIC | Age: 60
End: 2025-08-27
Payer: COMMERCIAL

## 2025-08-30 ENCOUNTER — HOSPITAL ENCOUNTER (OUTPATIENT)
Dept: RADIOLOGY | Facility: HOSPITAL | Age: 60
Discharge: HOME | End: 2025-08-30
Attending: FAMILY MEDICINE
Payer: COMMERCIAL

## 2025-08-30 DIAGNOSIS — G89.29 CHRONIC BILATERAL LOW BACK PAIN WITHOUT SCIATICA: ICD-10-CM

## 2025-08-30 DIAGNOSIS — M54.50 CHRONIC BILATERAL LOW BACK PAIN WITHOUT SCIATICA: ICD-10-CM

## 2025-08-30 DIAGNOSIS — M51.9 ANNULAR DISC TEAR: ICD-10-CM

## 2025-08-30 PROCEDURE — 72148 MRI LUMBAR SPINE W/O DYE: CPT | Mod: 26,,, | Performed by: RADIOLOGY

## 2025-08-30 PROCEDURE — 72148 MRI LUMBAR SPINE W/O DYE: CPT | Mod: TC

## 2025-09-03 ENCOUNTER — TELEPHONE (OUTPATIENT)
Dept: BARIATRICS | Facility: CLINIC | Age: 60
End: 2025-09-03

## 2025-09-03 ENCOUNTER — OFFICE VISIT (OUTPATIENT)
Dept: BARIATRICS | Facility: CLINIC | Age: 60
End: 2025-09-03
Payer: COMMERCIAL

## 2025-09-03 VITALS
OXYGEN SATURATION: 96 % | BODY MASS INDEX: 44.1 KG/M2 | DIASTOLIC BLOOD PRESSURE: 82 MMHG | WEIGHT: 315 LBS | SYSTOLIC BLOOD PRESSURE: 144 MMHG | HEIGHT: 71 IN | HEART RATE: 76 BPM

## 2025-09-03 DIAGNOSIS — G47.33 OSA (OBSTRUCTIVE SLEEP APNEA): ICD-10-CM

## 2025-09-03 DIAGNOSIS — E66.813 CLASS 3 SEVERE OBESITY DUE TO EXCESS CALORIES WITHOUT SERIOUS COMORBIDITY WITH BODY MASS INDEX (BMI) OF 45.0 TO 49.9 IN ADULT: Primary | ICD-10-CM

## 2025-09-03 DIAGNOSIS — E66.813 CLASS 3 SEVERE OBESITY DUE TO EXCESS CALORIES WITHOUT SERIOUS COMORBIDITY WITH BODY MASS INDEX (BMI) OF 45.0 TO 49.9 IN ADULT: ICD-10-CM

## 2025-09-03 PROCEDURE — 1160F RVW MEDS BY RX/DR IN RCRD: CPT | Mod: CPTII,S$GLB,, | Performed by: INTERNAL MEDICINE

## 2025-09-03 PROCEDURE — 1159F MED LIST DOCD IN RCRD: CPT | Mod: CPTII,S$GLB,, | Performed by: INTERNAL MEDICINE

## 2025-09-03 PROCEDURE — 3077F SYST BP >= 140 MM HG: CPT | Mod: CPTII,S$GLB,, | Performed by: INTERNAL MEDICINE

## 2025-09-03 PROCEDURE — 4010F ACE/ARB THERAPY RXD/TAKEN: CPT | Mod: CPTII,S$GLB,, | Performed by: INTERNAL MEDICINE

## 2025-09-03 PROCEDURE — 99999 PR PBB SHADOW E&M-EST. PATIENT-LVL IV: CPT | Mod: PBBFAC,,, | Performed by: INTERNAL MEDICINE

## 2025-09-03 PROCEDURE — 3008F BODY MASS INDEX DOCD: CPT | Mod: CPTII,S$GLB,, | Performed by: INTERNAL MEDICINE

## 2025-09-03 PROCEDURE — 3044F HG A1C LEVEL LT 7.0%: CPT | Mod: CPTII,S$GLB,, | Performed by: INTERNAL MEDICINE

## 2025-09-03 PROCEDURE — 99215 OFFICE O/P EST HI 40 MIN: CPT | Mod: S$GLB,,, | Performed by: INTERNAL MEDICINE

## 2025-09-03 PROCEDURE — 3079F DIAST BP 80-89 MM HG: CPT | Mod: CPTII,S$GLB,, | Performed by: INTERNAL MEDICINE

## 2025-09-03 RX ORDER — TIRZEPATIDE 2.5 MG/.5ML
2.5 INJECTION, SOLUTION SUBCUTANEOUS
Qty: 2 ML | Refills: 0 | Status: SHIPPED | OUTPATIENT
Start: 2025-09-03 | End: 2025-09-03 | Stop reason: SDUPTHER

## 2025-09-03 RX ORDER — TIRZEPATIDE 5 MG/.5ML
5 INJECTION, SOLUTION SUBCUTANEOUS
Qty: 2 ML | Refills: 0 | Status: SHIPPED | OUTPATIENT
Start: 2025-10-02 | End: 2025-10-30

## 2025-09-03 RX ORDER — TIRZEPATIDE 2.5 MG/.5ML
2.5 INJECTION, SOLUTION SUBCUTANEOUS
Qty: 2 ML | Refills: 0 | Status: SHIPPED | OUTPATIENT
Start: 2025-09-03 | End: 2025-10-01

## 2025-09-03 RX ORDER — TIRZEPATIDE 5 MG/.5ML
5 INJECTION, SOLUTION SUBCUTANEOUS
Qty: 2 ML | Refills: 0 | Status: SHIPPED | OUTPATIENT
Start: 2025-10-02 | End: 2025-09-03 | Stop reason: SDUPTHER

## (undated) DEVICE — NDL HYPO REG 25G X 1 1/2

## (undated) DEVICE — COVER MAYO STAND REINFRCD 30

## (undated) DEVICE — SYR B-D DISP CONTROL 10CC100/C

## (undated) DEVICE — GLOVE BIOGEL SKINSENSE PI 7.0

## (undated) DEVICE — PAD ABD 8X10 STERILE

## (undated) DEVICE — CLOSURE SKIN STERI STRIP 1/2X4

## (undated) DEVICE — TUBE SET INFLOW/OUTFLOW

## (undated) DEVICE — GLOVE SURGEON SYN PF SZ 9

## (undated) DEVICE — GLOVE ORTHO PF SZ 8.5

## (undated) DEVICE — PAD CAST SPECIALIST STRL 6

## (undated) DEVICE — GOWN SMARTGOWN LVL4 X-LONG XL

## (undated) DEVICE — PAD ELECTRODE STER 1.5X3

## (undated) DEVICE — GAUZE SPONGE 4X4 12PLY

## (undated) DEVICE — Device

## (undated) DEVICE — SOL IRR NACL .9% 3000ML

## (undated) DEVICE — SUT MCRYL PLUS 4-0 PS2 27IN

## (undated) DEVICE — ADHESIVE MASTISOL VIAL 48/BX

## (undated) DEVICE — PAD KNEE POLAR XL

## (undated) DEVICE — APPLICATOR CHLORAPREP ORN 26ML

## (undated) DEVICE — SEE MEDLINE ITEM 157169

## (undated) DEVICE — BLADE 4.2MM PREBENT ULTRACUT

## (undated) DEVICE — GOWN SMART IMP BREATHABLE XXLG

## (undated) DEVICE — DRESSING XEROFORM FOIL PK 1X8

## (undated) DEVICE — UNDERGLOVES BIOGEL PI SIZE 7.5

## (undated) DEVICE — SOL 9P NACL IRR PIC IL

## (undated) DEVICE — SYR 10CC LUER LOCK